# Patient Record
Sex: FEMALE | Race: WHITE | NOT HISPANIC OR LATINO | Employment: UNEMPLOYED | URBAN - NONMETROPOLITAN AREA
[De-identification: names, ages, dates, MRNs, and addresses within clinical notes are randomized per-mention and may not be internally consistent; named-entity substitution may affect disease eponyms.]

---

## 2017-02-13 ENCOUNTER — OFFICE VISIT (OUTPATIENT)
Dept: FAMILY MEDICINE CLINIC | Facility: CLINIC | Age: 22
End: 2017-02-13

## 2017-02-13 VITALS
OXYGEN SATURATION: 99 % | HEIGHT: 69 IN | BODY MASS INDEX: 24.88 KG/M2 | DIASTOLIC BLOOD PRESSURE: 80 MMHG | TEMPERATURE: 99.3 F | SYSTOLIC BLOOD PRESSURE: 108 MMHG | WEIGHT: 168 LBS

## 2017-02-13 DIAGNOSIS — F41.9 ANXIETY: ICD-10-CM

## 2017-02-13 DIAGNOSIS — J02.9 SORE THROAT: Primary | ICD-10-CM

## 2017-02-13 PROCEDURE — 99213 OFFICE O/P EST LOW 20 MIN: CPT | Performed by: NURSE PRACTITIONER

## 2017-02-13 RX ORDER — CLONAZEPAM 0.5 MG/1
0.5 TABLET ORAL 3 TIMES DAILY PRN
Qty: 90 TABLET | Refills: 0 | Status: SHIPPED | OUTPATIENT
Start: 2017-02-13 | End: 2017-07-11

## 2017-02-13 RX ORDER — AZITHROMYCIN 250 MG/1
TABLET, FILM COATED ORAL
Qty: 6 TABLET | Refills: 0 | Status: SHIPPED | OUTPATIENT
Start: 2017-02-13 | End: 2017-06-19

## 2017-02-13 NOTE — PROGRESS NOTES
"  Chief Complaint   Patient presents with   • Cough   • Sore Throat     has gotten better   • Med Refill     Subjective   Nirmala Tamayo is a 21 y.o. female.     Cough   This is a recurrent problem. The current episode started more than 1 month ago. The problem has been rapidly worsening. The problem occurs constantly. The cough is productive of sputum. Associated symptoms include nasal congestion, postnasal drip, rhinorrhea and a sore throat. Pertinent negatives include no chest pain, chills, ear congestion, ear pain, fever, headaches, heartburn, hemoptysis, myalgias, rash, shortness of breath, sweats, weight loss or wheezing. Nothing aggravates the symptoms. She has tried cool air for the symptoms. The treatment provided mild relief. There is no history of asthma, bronchiectasis, bronchitis, COPD, emphysema, environmental allergies or pneumonia.   Sore Throat    Associated symptoms include coughing. Pertinent negatives include no ear pain, headaches or shortness of breath.        The following portions of the patient's history were reviewed and updated as appropriate: allergies, current medications, past social history and problem list.    Review of Systems   Constitutional: Negative for chills, fever and weight loss.   HENT: Positive for postnasal drip, rhinorrhea and sore throat. Negative for ear pain.    Respiratory: Positive for cough. Negative for hemoptysis, shortness of breath and wheezing.    Cardiovascular: Negative for chest pain.   Gastrointestinal: Negative for heartburn.   Musculoskeletal: Negative for myalgias.   Skin: Negative for rash.   Allergic/Immunologic: Negative for environmental allergies.   Neurological: Negative for headaches.       Objective   Visit Vitals   • /80 (BP Location: Left arm, Patient Position: Sitting, Cuff Size: Adult)   • Temp 99.3 °F (37.4 °C) (Tympanic)   • Ht 69\" (175.3 cm)   • Wt 168 lb (76.2 kg)   • SpO2 99%   • BMI 24.81 kg/m2     Physical " Exam    Assessment/Plan   Problem List Items Addressed This Visit     None           New Medications Ordered This Visit   Medications   • azithromycin (ZITHROMAX Z-EMILIANA) 250 MG tablet     Sig: Take 2 tablets the first day, then 1 tablet daily for 4 days.     Dispense:  6 tablet     Refill:  0   • clonazePAM (KlonoPIN) 0.5 MG tablet     Sig: Take 1 tablet by mouth 3 (Three) Times a Day As Needed for anxiety.     Dispense:  90 tablet     Refill:  0

## 2017-02-13 NOTE — PROGRESS NOTES
Chief Complaint   Patient presents with   • Cough   • Sore Throat     has gotten better   • Med Refill     Subjective   Nirmala Tamayo is a 21 y.o. female.     Cough   This is a new problem. The current episode started in the past 7 days. The problem has been gradually worsening. The problem occurs constantly. The cough is productive of sputum. Associated symptoms include myalgias, nasal congestion, postnasal drip, rhinorrhea and a sore throat. Pertinent negatives include no chest pain, chills, ear congestion, ear pain, fever, headaches, heartburn, hemoptysis, rash, shortness of breath, sweats, weight loss or wheezing. Nothing aggravates the symptoms. She has tried oral steroids and OTC inhaler for the symptoms. The treatment provided mild relief. Her past medical history is significant for asthma. There is no history of bronchiectasis, bronchitis, COPD, emphysema, environmental allergies or pneumonia.   Anxiety   Presents for follow-up visit. Symptoms include dizziness, excessive worry, irritability, malaise, nervous/anxious behavior, panic and restlessness. Patient reports no chest pain, compulsions, confusion, decreased concentration, depressed mood, feeling of choking, hyperventilation, impotence, insomnia, muscle tension, nausea, obsessions, palpitations, shortness of breath or suicidal ideas. Symptoms occur constantly. The severity of symptoms is moderate. The quality of sleep is good. Nighttime awakenings: none.     Her past medical history is significant for asthma. Compliance with medications is %.        The following portions of the patient's history were reviewed and updated as appropriate: allergies, current medications, past social history and problem list.    Review of Systems   Constitutional: Positive for irritability. Negative for chills, fever and weight loss.   HENT: Positive for congestion, postnasal drip, rhinorrhea, sinus pressure, sneezing and sore throat. Negative for ear  "pain, facial swelling, hearing loss, mouth sores, nosebleeds, tinnitus, trouble swallowing and voice change.    Eyes: Negative.    Respiratory: Positive for cough. Negative for hemoptysis, shortness of breath and wheezing.    Cardiovascular: Negative.  Negative for chest pain and palpitations.   Gastrointestinal: Negative.  Negative for abdominal distention, abdominal pain, anal bleeding, heartburn and nausea.   Endocrine: Negative.  Negative for cold intolerance, heat intolerance, polydipsia and polyphagia.   Genitourinary: Negative.  Negative for impotence.   Musculoskeletal: Positive for myalgias.   Skin: Negative.  Negative for rash.   Allergic/Immunologic: Negative.  Negative for environmental allergies.   Neurological: Positive for dizziness. Negative for headaches.   Hematological: Negative.    Psychiatric/Behavioral: Negative for behavioral problems, confusion, decreased concentration and suicidal ideas. The patient is nervous/anxious. The patient does not have insomnia.        Objective   Visit Vitals   • /80 (BP Location: Left arm, Patient Position: Sitting, Cuff Size: Adult)   • Temp 99.3 °F (37.4 °C) (Tympanic)   • Ht 69\" (175.3 cm)   • Wt 168 lb (76.2 kg)   • SpO2 99%   • BMI 24.81 kg/m2     Physical Exam   Constitutional: She is oriented to person, place, and time. She appears well-developed and well-nourished.   HENT:   Head: Normocephalic and atraumatic.   Mouth/Throat: Oropharyngeal exudate present.   Eyes: EOM are normal. Pupils are equal, round, and reactive to light.   Neck: Normal range of motion. Neck supple.   Cardiovascular: Normal rate, regular rhythm, normal heart sounds and normal pulses.    Pulmonary/Chest: Effort normal and breath sounds normal.   Abdominal: Soft. Bowel sounds are normal.   Genitourinary: Rectum normal. Pelvic exam was performed with patient supine. Uterus is not deviated, not enlarged, not fixed and not tender. Cervix exhibits no motion tenderness, no discharge " and no friability. Right adnexum displays no mass. Left adnexum displays no mass.   Musculoskeletal: Normal range of motion.   Neurological: She is alert and oriented to person, place, and time.   Skin: Skin is warm and dry.   Psychiatric: She has a normal mood and affect.   Nursing note and vitals reviewed.      Assessment/Plan   Problem List Items Addressed This Visit        Respiratory    Sore throat - Primary       Other    Anxiety           New Medications Ordered This Visit   Medications   • azithromycin (ZITHROMAX Z-EMILIANA) 250 MG tablet     Sig: Take 2 tablets the first day, then 1 tablet daily for 4 days.     Dispense:  6 tablet     Refill:  0   • clonazePAM (KlonoPIN) 0.5 MG tablet     Sig: Take 1 tablet by mouth 3 (Three) Times a Day As Needed for anxiety.     Dispense:  90 tablet     Refill:  0        Patient understands the risks associated with this controlled medication, including tolerance and addiction.  she also agrees to only obtain this medication from me, and not from a another provider, unless that provider is covering for me in my absence.  she also agrees to be compliant in dosing, and not self adjust the dose of medication.  A signed controlled substance agreement is on file, and she has received a controlled substance education sheet at this a previous visit.  she has also signed a consent for treatment with a controlled substance as per [facility] policy. SANGEETHA was obtained.

## 2017-06-20 ENCOUNTER — HOSPITAL ENCOUNTER (EMERGENCY)
Facility: HOSPITAL | Age: 22
Discharge: LEFT BEFORE DISCHARGE | End: 2017-06-20

## 2017-06-20 VITALS
TEMPERATURE: 98.4 F | HEART RATE: 91 BPM | BODY MASS INDEX: 23.25 KG/M2 | OXYGEN SATURATION: 98 % | DIASTOLIC BLOOD PRESSURE: 76 MMHG | HEIGHT: 69 IN | SYSTOLIC BLOOD PRESSURE: 112 MMHG | WEIGHT: 157 LBS | RESPIRATION RATE: 18 BRPM

## 2017-06-20 PROCEDURE — 99211 OFF/OP EST MAY X REQ PHY/QHP: CPT

## 2017-06-20 PROCEDURE — 93005 ELECTROCARDIOGRAM TRACING: CPT | Performed by: EMERGENCY MEDICINE

## 2017-07-06 ENCOUNTER — APPOINTMENT (OUTPATIENT)
Dept: ULTRASOUND IMAGING | Facility: HOSPITAL | Age: 22
End: 2017-07-06

## 2017-07-06 ENCOUNTER — HOSPITAL ENCOUNTER (EMERGENCY)
Facility: HOSPITAL | Age: 22
Discharge: HOME | End: 2017-07-06
Attending: EMERGENCY MEDICINE | Admitting: EMERGENCY MEDICINE

## 2017-07-06 VITALS
TEMPERATURE: 98.7 F | WEIGHT: 157 LBS | DIASTOLIC BLOOD PRESSURE: 74 MMHG | SYSTOLIC BLOOD PRESSURE: 121 MMHG | HEART RATE: 96 BPM | OXYGEN SATURATION: 98 % | BODY MASS INDEX: 23.25 KG/M2 | HEIGHT: 69 IN | RESPIRATION RATE: 18 BRPM

## 2017-07-06 DIAGNOSIS — N39.0 UTI (URINARY TRACT INFECTION), UNCOMPLICATED: ICD-10-CM

## 2017-07-06 DIAGNOSIS — Z3A.01 LESS THAN 8 WEEKS GESTATION OF PREGNANCY: Primary | ICD-10-CM

## 2017-07-06 LAB
ABO GROUP BLD: NORMAL
ALBUMIN SERPL-MCNC: 4.8 G/DL (ref 3.4–4.8)
ALBUMIN/GLOB SERPL: 1.5 G/DL (ref 1.1–1.8)
ALP SERPL-CCNC: 67 U/L (ref 38–126)
ALT SERPL W P-5'-P-CCNC: 30 U/L (ref 9–52)
ANION GAP SERPL CALCULATED.3IONS-SCNC: 13 MMOL/L (ref 5–15)
AST SERPL-CCNC: 23 U/L (ref 14–36)
BACTERIA UR QL AUTO: ABNORMAL /HPF
BASOPHILS # BLD AUTO: 0.02 10*3/MM3 (ref 0–0.2)
BASOPHILS NFR BLD AUTO: 0.3 % (ref 0–2)
BILIRUB SERPL-MCNC: 0.3 MG/DL (ref 0.2–1.3)
BILIRUB UR QL STRIP: NEGATIVE
BUN BLD-MCNC: 7 MG/DL (ref 7–21)
BUN/CREAT SERPL: 11.1 (ref 7–25)
CALCIUM SPEC-SCNC: 9.3 MG/DL (ref 8.4–10.2)
CHLORIDE SERPL-SCNC: 102 MMOL/L (ref 95–110)
CLARITY UR: CLEAR
CO2 SERPL-SCNC: 23 MMOL/L (ref 22–31)
COLOR UR: YELLOW
CREAT BLD-MCNC: 0.63 MG/DL (ref 0.5–1)
DEPRECATED RDW RBC AUTO: 39.6 FL (ref 36.4–46.3)
EOSINOPHIL # BLD AUTO: 0.09 10*3/MM3 (ref 0–0.7)
EOSINOPHIL NFR BLD AUTO: 1.2 % (ref 0–7)
ERYTHROCYTE [DISTWIDTH] IN BLOOD BY AUTOMATED COUNT: 13.3 % (ref 11.5–14.5)
GFR SERPL CREATININE-BSD FRML MDRD: 118 ML/MIN/1.73 (ref 71–165)
GLOBULIN UR ELPH-MCNC: 3.1 GM/DL (ref 2.3–3.5)
GLUCOSE BLD-MCNC: 79 MG/DL (ref 60–100)
GLUCOSE UR STRIP-MCNC: NEGATIVE MG/DL
HCG INTACT+B SERPL-ACNC: 5089 MIU/ML
HCT VFR BLD AUTO: 39.2 % (ref 35–45)
HGB BLD-MCNC: 13.3 G/DL (ref 12–15.5)
HGB UR QL STRIP.AUTO: NEGATIVE
HYALINE CASTS UR QL AUTO: ABNORMAL /LPF
IMM GRANULOCYTES # BLD: 0.01 10*3/MM3 (ref 0–0.02)
IMM GRANULOCYTES NFR BLD: 0.1 % (ref 0–0.5)
KETONES UR QL STRIP: NEGATIVE
LEUKOCYTE ESTERASE UR QL STRIP.AUTO: ABNORMAL
LYMPHOCYTES # BLD AUTO: 1.63 10*3/MM3 (ref 0.6–4.2)
LYMPHOCYTES NFR BLD AUTO: 21.9 % (ref 10–50)
MCH RBC QN AUTO: 27.5 PG (ref 26.5–34)
MCHC RBC AUTO-ENTMCNC: 33.9 G/DL (ref 31.4–36)
MCV RBC AUTO: 81.2 FL (ref 80–98)
MONOCYTES # BLD AUTO: 0.52 10*3/MM3 (ref 0–0.9)
MONOCYTES NFR BLD AUTO: 7 % (ref 0–12)
NEUTROPHILS # BLD AUTO: 5.18 10*3/MM3 (ref 2–8.6)
NEUTROPHILS NFR BLD AUTO: 69.5 % (ref 37–80)
NITRITE UR QL STRIP: NEGATIVE
PH UR STRIP.AUTO: 6.5 [PH] (ref 5–9)
PLATELET # BLD AUTO: 214 10*3/MM3 (ref 150–450)
PMV BLD AUTO: 10 FL (ref 8–12)
POTASSIUM BLD-SCNC: 3.4 MMOL/L (ref 3.5–5.1)
PROT SERPL-MCNC: 7.9 G/DL (ref 6.3–8.6)
PROT UR QL STRIP: NEGATIVE
RBC # BLD AUTO: 4.83 10*6/MM3 (ref 3.77–5.16)
RBC # UR: ABNORMAL /HPF
REF LAB TEST METHOD: ABNORMAL
RH BLD: NEGATIVE
SODIUM BLD-SCNC: 138 MMOL/L (ref 137–145)
SP GR UR STRIP: 1.01 (ref 1–1.03)
SQUAMOUS #/AREA URNS HPF: ABNORMAL /HPF
UROBILINOGEN UR QL STRIP: ABNORMAL
WBC NRBC COR # BLD: 7.45 10*3/MM3 (ref 3.2–9.8)
WBC UR QL AUTO: ABNORMAL /HPF

## 2017-07-06 PROCEDURE — 81001 URINALYSIS AUTO W/SCOPE: CPT | Performed by: EMERGENCY MEDICINE

## 2017-07-06 PROCEDURE — 86901 BLOOD TYPING SEROLOGIC RH(D): CPT | Performed by: EMERGENCY MEDICINE

## 2017-07-06 PROCEDURE — 80053 COMPREHEN METABOLIC PANEL: CPT | Performed by: EMERGENCY MEDICINE

## 2017-07-06 PROCEDURE — 87086 URINE CULTURE/COLONY COUNT: CPT | Performed by: EMERGENCY MEDICINE

## 2017-07-06 PROCEDURE — 76817 TRANSVAGINAL US OBSTETRIC: CPT

## 2017-07-06 PROCEDURE — 86900 BLOOD TYPING SEROLOGIC ABO: CPT | Performed by: EMERGENCY MEDICINE

## 2017-07-06 PROCEDURE — 99284 EMERGENCY DEPT VISIT MOD MDM: CPT

## 2017-07-06 PROCEDURE — 85025 COMPLETE CBC W/AUTO DIFF WBC: CPT | Performed by: EMERGENCY MEDICINE

## 2017-07-06 PROCEDURE — 84702 CHORIONIC GONADOTROPIN TEST: CPT | Performed by: EMERGENCY MEDICINE

## 2017-07-06 RX ORDER — CEPHALEXIN 500 MG/1
500 CAPSULE ORAL 4 TIMES DAILY
Qty: 28 CAPSULE | Refills: 0 | Status: SHIPPED | OUTPATIENT
Start: 2017-07-06 | End: 2017-07-11

## 2017-07-06 RX ORDER — PNV NO.95/FERROUS FUM/FOLIC AC 28MG-0.8MG
1 TABLET ORAL DAILY
Qty: 30 TABLET | Refills: 0 | Status: SHIPPED | OUTPATIENT
Start: 2017-07-06 | End: 2018-02-22

## 2017-07-06 RX ORDER — SODIUM CHLORIDE 0.9 % (FLUSH) 0.9 %
10 SYRINGE (ML) INJECTION AS NEEDED
Status: DISCONTINUED | OUTPATIENT
Start: 2017-07-06 | End: 2017-07-06 | Stop reason: HOSPADM

## 2017-07-06 NOTE — ED PROVIDER NOTES
Subjective   HPI Comments: 23yo female pmh significant asthma,  @ 5 6/7wks gestation via LNMP 2017, presents ED c/o 1d hx pelvic cramping, chills.  ROS neg fever/n/v/d/dysuria/hematuria/vaginal bleeding/vaginal discharge/flank pain.    Patient is a 22 y.o. female presenting with general illness.   Illness   Severity:  Mild  Onset quality:  Sudden  Duration:  1 day  Timing:  Intermittent  Chronicity:  New  Associated symptoms: no fever        Review of Systems   Constitutional: Positive for chills. Negative for fever.   HENT: Negative.    Eyes: Negative.    Respiratory: Negative.    Cardiovascular: Negative.    Gastrointestinal: Negative.    Endocrine: Negative.    Genitourinary: Positive for pelvic pain.       Past Medical History:   Diagnosis Date   • Acute allergic reaction    • Acute bronchitis    • Acute pharyngitis    • Agoraphobia with panic attacks    • Allergic rhinitis    • Anxiety    • Anxiety state    • Asthma     Stable   • Back strain    • Constipation    • Cough    • Disturbance of skin sensation    • Headache    • Irregular periods    • Irritable bowel syndrome with constipation    • Low back pain    • Lumbosacral dysfunction    • Neck pain    • Palpitations    • Rhinitis    • Severe depression    • Spasm     cervical spasm   • Upper respiratory infection    • Vaginal irritation    • Vitreous floaters     prob, not seen on exam   • Wheezing        No Known Allergies    Past Surgical History:   Procedure Laterality Date   • PROCEDURE GENERIC CONVERTED  2016    Physical Therapy Consult       Family History   Problem Relation Age of Onset   • Heart attack Mother      Stents & heart attack   • COPD Sister    • Cancer Other    • Diabetes Other    • Hypertension Other    • Stroke Other    • Thyroid disease Other    • Gallbladder disease Other      Gallstones       Social History     Social History   • Marital status:      Spouse name: N/A   • Number of children: N/A   • Years of  education: N/A     Social History Main Topics   • Smoking status: Never Smoker   • Smokeless tobacco: Never Used   • Alcohol use No   • Drug use: No   • Sexual activity: Not Asked     Other Topics Concern   • None     Social History Narrative   • None           Objective   Physical Exam   Constitutional: She is oriented to person, place, and time. She appears well-developed and well-nourished.   HENT:   Head: Normocephalic and atraumatic.   Mouth/Throat: Oropharynx is clear and moist.   Eyes: Pupils are equal, round, and reactive to light.   Neck: Neck supple. No JVD present. No tracheal deviation present.   Cardiovascular: Normal rate, regular rhythm, normal heart sounds and intact distal pulses.  Exam reveals no gallop and no friction rub.    No murmur heard.  Pulmonary/Chest: Effort normal and breath sounds normal. She has no wheezes. She has no rales.   Abdominal: Soft. Bowel sounds are normal. There is no tenderness. There is no rebound, no guarding and no CVA tenderness.   Musculoskeletal: Normal range of motion. She exhibits no edema or tenderness.   Lymphadenopathy:     She has no cervical adenopathy.   Neurological: She is alert and oriented to person, place, and time.   Skin: Skin is warm and dry.   Nursing note and vitals reviewed.      Procedures         ED Course  ED Course      Labs Reviewed   URINALYSIS W/ CULTURE IF INDICATED - Abnormal; Notable for the following:        Result Value    Leuk Esterase, UA Small (1+) (*)     All other components within normal limits   URINALYSIS, MICROSCOPIC ONLY - Abnormal; Notable for the following:     WBC, UA 6-12 (*)     All other components within normal limits   COMPREHENSIVE METABOLIC PANEL - Abnormal; Notable for the following:     Potassium 3.4 (*)     All other components within normal limits   CBC WITH AUTO DIFFERENTIAL - Normal   URINE CULTURE   HCG, QUANTITATIVE, PREGNANCY   ABO/RH   CBC AND DIFFERENTIAL    Narrative:     The following orders were created  for panel order CBC & Differential.  Procedure                               Abnormality         Status                     ---------                               -----------         ------                     CBC Auto Differential[[id number]]         Normal              Final result                 Please view results for these tests on the individual orders.     Us Ob Transvaginal    Result Date: 7/6/2017  Narrative: Ultrasound OB transvaginal. HISTORY: Pelvic pain. FINDINGS: Small intrauterine gestational sac. A yolk sac is identified within the gestational sac. No fetal pole or cardiac activity as of yet. These findings suggest a gestational age of less than five weeks. A follow-up examination within one week's time is suggested for confirmation of fetal viability. Normal right and left ovaries.     Impression: CONCLUSION: Very small intrauterine gestational sac. Yolk sac is identified but no fetal pole as of yet. No cardiac activity is observed as of yet.  Size of gestational sac suggests a gestational age of less than five weeks. A follow-up examination in one week's time suggested for confirmation fetal viability. Normal ovaries. Electronically signed by:  Jose Maria Card MD  7/6/2017 5:27 PM CDT Workstation: [id number]                [facility]    Final diagnoses:   Less than 8 weeks gestation of pregnancy   UTI (urinary tract infection), uncomplicated            Earl Smith MD  07/06/17 0947

## 2017-07-06 NOTE — DISCHARGE INSTRUCTIONS
Return ED fever, flank pain, abdominal pain, vomiting, dehydration, vaginal bleeding, worse condition, other concerns  Followup Ob/Gyn as directed for further evaluation

## 2017-07-08 LAB — BACTERIA SPEC AEROBE CULT: NORMAL

## 2017-07-11 ENCOUNTER — LAB (OUTPATIENT)
Dept: LAB | Facility: HOSPITAL | Age: 22
End: 2017-07-11

## 2017-07-11 ENCOUNTER — INITIAL PRENATAL (OUTPATIENT)
Dept: OBSTETRICS AND GYNECOLOGY | Facility: CLINIC | Age: 22
End: 2017-07-11

## 2017-07-11 VITALS — DIASTOLIC BLOOD PRESSURE: 64 MMHG | SYSTOLIC BLOOD PRESSURE: 106 MMHG | WEIGHT: 155 LBS | BODY MASS INDEX: 22.89 KG/M2

## 2017-07-11 DIAGNOSIS — Z34.90 THIRD PREGNANCY: ICD-10-CM

## 2017-07-11 DIAGNOSIS — Z3A.00 WEEKS OF GESTATION OF PREGNANCY NOT SPECIFIED: ICD-10-CM

## 2017-07-11 DIAGNOSIS — Z67.91 RH NEGATIVE STATUS DURING PREGNANCY IN FIRST TRIMESTER, ANTEPARTUM: ICD-10-CM

## 2017-07-11 DIAGNOSIS — Z34.90 THIRD PREGNANCY: Primary | ICD-10-CM

## 2017-07-11 DIAGNOSIS — O26.891 RH NEGATIVE STATUS DURING PREGNANCY IN FIRST TRIMESTER, ANTEPARTUM: ICD-10-CM

## 2017-07-11 LAB
ABO GROUP BLD: NORMAL
AMPHET+METHAMPHET UR QL: NEGATIVE
BARBITURATES UR QL SCN: NEGATIVE
BASOPHILS # BLD AUTO: 0.02 10*3/MM3 (ref 0–0.2)
BASOPHILS NFR BLD AUTO: 0.3 % (ref 0–2)
BENZODIAZ UR QL SCN: NEGATIVE
BILIRUB UR QL STRIP: NEGATIVE
BLD GP AB SCN SERPL QL: NEGATIVE
CANDIDA ALBICANS: NEGATIVE
CANNABINOIDS SERPL QL: NEGATIVE
CLARITY UR: CLEAR
COCAINE UR QL: NEGATIVE
COLOR UR: ABNORMAL
DEPRECATED RDW RBC AUTO: 41.4 FL (ref 36.4–46.3)
EOSINOPHIL # BLD AUTO: 0.06 10*3/MM3 (ref 0–0.7)
EOSINOPHIL NFR BLD AUTO: 0.9 % (ref 0–7)
ERYTHROCYTE [DISTWIDTH] IN BLOOD BY AUTOMATED COUNT: 13.7 % (ref 11.5–14.5)
GARDNERELLA VAGINALIS: POSITIVE
GLUCOSE UR STRIP-MCNC: NEGATIVE MG/DL
HCT VFR BLD AUTO: 36.7 % (ref 35–45)
HGB BLD-MCNC: 12.3 G/DL (ref 12–15.5)
HGB UR QL STRIP.AUTO: NEGATIVE
IMM GRANULOCYTES # BLD: 0 10*3/MM3 (ref 0–0.02)
IMM GRANULOCYTES NFR BLD: 0 % (ref 0–0.5)
KETONES UR QL STRIP: ABNORMAL
LEUKOCYTE ESTERASE UR QL STRIP.AUTO: NEGATIVE
LYMPHOCYTES # BLD AUTO: 1.68 10*3/MM3 (ref 0.6–4.2)
LYMPHOCYTES NFR BLD AUTO: 25.6 % (ref 10–50)
Lab: NORMAL
MCH RBC QN AUTO: 27.6 PG (ref 26.5–34)
MCHC RBC AUTO-ENTMCNC: 33.5 G/DL (ref 31.4–36)
MCV RBC AUTO: 82.5 FL (ref 80–98)
METHADONE UR QL SCN: NEGATIVE
MONOCYTES # BLD AUTO: 0.43 10*3/MM3 (ref 0–0.9)
MONOCYTES NFR BLD AUTO: 6.6 % (ref 0–12)
NEUTROPHILS # BLD AUTO: 4.37 10*3/MM3 (ref 2–8.6)
NEUTROPHILS NFR BLD AUTO: 66.6 % (ref 37–80)
NITRITE UR QL STRIP: NEGATIVE
OPIATES UR QL: NEGATIVE
OXYCODONE UR QL SCN: NEGATIVE
PH UR STRIP.AUTO: 6.5 [PH] (ref 5–9)
PLATELET # BLD AUTO: 183 10*3/MM3 (ref 150–450)
PMV BLD AUTO: 10.5 FL (ref 8–12)
PROT UR QL STRIP: NEGATIVE
RBC # BLD AUTO: 4.45 10*6/MM3 (ref 3.77–5.16)
RH BLD: NEGATIVE
SP GR UR STRIP: 1.03 (ref 1–1.03)
TRICHOMONAS VAGINALIS PCR: NEGATIVE
UROBILINOGEN UR QL STRIP: ABNORMAL
WBC NRBC COR # BLD: 6.56 10*3/MM3 (ref 3.2–9.8)

## 2017-07-11 PROCEDURE — 87491 CHLMYD TRACH DNA AMP PROBE: CPT | Performed by: OBSTETRICS & GYNECOLOGY

## 2017-07-11 PROCEDURE — 80307 DRUG TEST PRSMV CHEM ANLYZR: CPT | Performed by: OBSTETRICS & GYNECOLOGY

## 2017-07-11 PROCEDURE — 86803 HEPATITIS C AB TEST: CPT | Performed by: OBSTETRICS & GYNECOLOGY

## 2017-07-11 PROCEDURE — 86900 BLOOD TYPING SEROLOGIC ABO: CPT | Performed by: OBSTETRICS & GYNECOLOGY

## 2017-07-11 PROCEDURE — 87340 HEPATITIS B SURFACE AG IA: CPT | Performed by: OBSTETRICS & GYNECOLOGY

## 2017-07-11 PROCEDURE — 87510 GARDNER VAG DNA DIR PROBE: CPT | Performed by: OBSTETRICS & GYNECOLOGY

## 2017-07-11 PROCEDURE — 87591 N.GONORRHOEAE DNA AMP PROB: CPT | Performed by: OBSTETRICS & GYNECOLOGY

## 2017-07-11 PROCEDURE — 87086 URINE CULTURE/COLONY COUNT: CPT | Performed by: OBSTETRICS & GYNECOLOGY

## 2017-07-11 PROCEDURE — 85025 COMPLETE CBC W/AUTO DIFF WBC: CPT | Performed by: OBSTETRICS & GYNECOLOGY

## 2017-07-11 PROCEDURE — 81003 URINALYSIS AUTO W/O SCOPE: CPT | Performed by: OBSTETRICS & GYNECOLOGY

## 2017-07-11 PROCEDURE — 87480 CANDIDA DNA DIR PROBE: CPT | Performed by: OBSTETRICS & GYNECOLOGY

## 2017-07-11 PROCEDURE — 87660 TRICHOMONAS VAGIN DIR PROBE: CPT | Performed by: OBSTETRICS & GYNECOLOGY

## 2017-07-11 PROCEDURE — 86850 RBC ANTIBODY SCREEN: CPT | Performed by: OBSTETRICS & GYNECOLOGY

## 2017-07-11 PROCEDURE — G0432 EIA HIV-1/HIV-2 SCREEN: HCPCS | Performed by: OBSTETRICS & GYNECOLOGY

## 2017-07-11 PROCEDURE — 0501F PRENATAL FLOW SHEET: CPT | Performed by: OBSTETRICS & GYNECOLOGY

## 2017-07-11 PROCEDURE — 36415 COLL VENOUS BLD VENIPUNCTURE: CPT | Performed by: OBSTETRICS & GYNECOLOGY

## 2017-07-11 PROCEDURE — 86901 BLOOD TYPING SEROLOGIC RH(D): CPT | Performed by: OBSTETRICS & GYNECOLOGY

## 2017-07-11 RX ORDER — NITROFURANTOIN 25; 75 MG/1; MG/1
100 CAPSULE ORAL 2 TIMES DAILY
COMMUNITY
End: 2017-09-12

## 2017-07-11 NOTE — PROGRESS NOTES
Chief Complaint   Patient presents with   • Initial Prenatal Visit       Nirmala Tamayo is a 22 y.o. year old .  Patient's last menstrual period was 2017 (approximate).  She presents to be seen to initiate prenatal care.    Smoking status: Never Smoker                                                              Smokeless status: Never Used                          The following portions of the patient's history were reviewed and updated as appropriate:vital signs, allergies, current medications, past medical history, past social history, past surgical history and problem list.    Lab Review   No data reviewed    Imaging   No data reviewed    Assessment/Plan   ASSESSMENT  1. IUP at 6w4d  Nirmala was seen today for initial prenatal visit.    Diagnoses and all orders for this visit:    Third pregnancy  -     Chlamydia trachomatis, Neisseria gonorrhoeae, PCR  -     OB Panel With HIV; Future  -     Urinalysis; Future  -     Urine Culture  -     Urine Drug Screen  -     Gardnerella vaginalis, Trichomonas vaginalis, Candida albicans, PCR  -     US Ob Transvaginal; Future  -     Hepatitis C Antibody    2.     PLAN  1. Tests ordered today:  Orders Placed This Encounter   Procedures   • Chlamydia trachomatis, Neisseria gonorrhoeae, PCR   • Urine Culture   • Gardnerella vaginalis, Trichomonas vaginalis, Candida albicans, PCR   • US Ob Transvaginal     Standing Status:   Future     Standing Expiration Date:   2018     Order Specific Question:   Reason for Exam:     Answer:   dating   • OB Panel With HIV     Standing Status:   Future     Number of Occurrences:   1     Standing Expiration Date:   2018   • Urinalysis     Standing Status:   Future     Number of Occurrences:   1     Standing Expiration Date:   2018   • Urine Drug Screen   • Hepatitis C Antibody     2. Medications prescribed today:  New Medications Ordered This Visit   Medications   • nitrofurantoin, macrocrystal-monohydrate,  (MACROBID) 100 MG capsule     Sig: Take 100 mg by mouth 2 (Two) Times a Day.       Follow up: 2 week(s)       This note was electronically signed.    Juanito Isaac MD  July 11, 2017

## 2017-07-12 LAB
BACTERIA SPEC AEROBE CULT: NORMAL
C TRACH RRNA CVX QL NAA+PROBE: NOT DETECTED
HBV SURFACE AG SERPL QL IA: NEGATIVE
HCV AB SER DONR QL: NEGATIVE
HIV1+2 AB SER QL: NEGATIVE
N GONORRHOEA RRNA SPEC QL NAA+PROBE: NOT DETECTED
RUBV IGG SER QL: ABNORMAL
RUBV IGG SER-ACNC: 36 IU/ML (ref 0–9.9)

## 2017-07-14 ENCOUNTER — OFFICE VISIT (OUTPATIENT)
Dept: FAMILY MEDICINE CLINIC | Facility: CLINIC | Age: 22
End: 2017-07-14

## 2017-07-14 VITALS
BODY MASS INDEX: 22.96 KG/M2 | SYSTOLIC BLOOD PRESSURE: 102 MMHG | HEIGHT: 69 IN | WEIGHT: 155 LBS | DIASTOLIC BLOOD PRESSURE: 72 MMHG

## 2017-07-14 DIAGNOSIS — N39.0 URINARY TRACT INFECTION, SITE UNSPECIFIED: Primary | ICD-10-CM

## 2017-07-14 LAB
BILIRUB BLD-MCNC: NEGATIVE MG/DL
CLARITY, POC: CLEAR
COLOR UR: NORMAL
GLUCOSE UR STRIP-MCNC: NEGATIVE MG/DL
KETONES UR QL: NEGATIVE
LEUKOCYTE EST, POC: NEGATIVE
NITRITE UR-MCNC: NEGATIVE MG/ML
PH UR: 6 [PH] (ref 5–8)
PROT UR STRIP-MCNC: NEGATIVE MG/DL
RBC # UR STRIP: NEGATIVE /UL
RPR SER QL: NORMAL
SP GR UR: 1.03 (ref 1–1.03)
UROBILINOGEN UR QL: NORMAL

## 2017-07-14 PROCEDURE — 81002 URINALYSIS NONAUTO W/O SCOPE: CPT | Performed by: NURSE PRACTITIONER

## 2017-07-14 PROCEDURE — 99213 OFFICE O/P EST LOW 20 MIN: CPT | Performed by: NURSE PRACTITIONER

## 2017-07-14 NOTE — PROGRESS NOTES
Chief Complaint   Patient presents with   • Follow-up     E/R Follow up for uti     Subjective   Nirmala Tamayo is a 22 y.o. female.     Urinary Tract Infection    This is a recurrent problem. The current episode started in the past 7 days. The problem occurs every urination. The problem has been gradually improving. The pain is at a severity of 2/10. The pain is mild. There has been no fever. She is sexually active. There is no history of pyelonephritis. Associated symptoms include urgency. Pertinent negatives include no chills, discharge, frequency, hematuria, hesitancy, nausea, possible pregnancy, sweats or vomiting. She has tried antibiotics for the symptoms. The treatment provided moderate relief. Her past medical history is significant for recurrent UTIs. There is no history of catheterization, kidney stones, a single kidney, urinary stasis or a urological procedure.        The following portions of the patient's history were reviewed and updated as appropriate: allergies, current medications, past social history and problem list.    Review of Systems   Constitutional: Negative for chills and fever.   HENT: Positive for congestion, postnasal drip, rhinorrhea, sinus pressure, sneezing and sore throat. Negative for ear pain, facial swelling, hearing loss, mouth sores, nosebleeds, tinnitus, trouble swallowing and voice change.    Eyes: Negative.    Respiratory: Positive for cough. Negative for shortness of breath and wheezing.    Cardiovascular: Negative.  Negative for chest pain and palpitations.   Gastrointestinal: Negative.  Negative for abdominal distention, abdominal pain, anal bleeding, nausea and vomiting.   Endocrine: Negative.  Negative for cold intolerance, heat intolerance, polydipsia and polyphagia.   Genitourinary: Positive for urgency and vaginal pain. Negative for frequency, hematuria and hesitancy.   Musculoskeletal: Positive for myalgias.   Skin: Negative.  Negative for rash.  "  Allergic/Immunologic: Negative.  Negative for environmental allergies.   Neurological: Positive for dizziness. Negative for headaches.   Hematological: Negative.    Psychiatric/Behavioral: Negative for behavioral problems, confusion, decreased concentration and suicidal ideas. The patient is nervous/anxious.        Objective   /72  Ht 69\" (175.3 cm)  Wt 155 lb (70.3 kg)  LMP 05/26/2017 (Approximate)  BMI 22.89 kg/m2  Physical Exam   Constitutional: She is oriented to person, place, and time. She appears well-developed and well-nourished.   HENT:   Head: Normocephalic and atraumatic.   Mouth/Throat: No oropharyngeal exudate.   Eyes: EOM are normal. Pupils are equal, round, and reactive to light.   Neck: Normal range of motion. Neck supple.   Cardiovascular: Normal rate, regular rhythm, normal heart sounds and normal pulses.    Pulmonary/Chest: Effort normal and breath sounds normal.   Abdominal: Soft. Bowel sounds are normal. There is tenderness.   Genitourinary: Rectum normal. Pelvic exam was performed with patient supine. Uterus is not deviated, not enlarged, not fixed and not tender. Cervix exhibits no motion tenderness, no discharge and no friability. Right adnexum displays no mass. Left adnexum displays no mass.   Musculoskeletal: Normal range of motion.   Neurological: She is alert and oriented to person, place, and time.   Skin: Skin is warm and dry.   Psychiatric: She has a normal mood and affect.   Nursing note and vitals reviewed.      Assessment/Plan   Problem List Items Addressed This Visit        Genitourinary    Urinary tract infection - Primary    Relevant Orders    POCT urinalysis dipstick, manual (Completed)         No orders of the defined types were placed in this encounter.     uti resolved, finish macrobid as directed, diet discussed, fluids reviewed, patient is 6 weeks preg-continue follow up with OB as directed  "

## 2017-07-19 PROCEDURE — 87086 URINE CULTURE/COLONY COUNT: CPT | Performed by: NURSE PRACTITIONER

## 2017-07-20 RX ORDER — METRONIDAZOLE 7.5 MG/G
GEL VAGINAL NIGHTLY
Qty: 70 G | Refills: 0 | Status: SHIPPED | OUTPATIENT
Start: 2017-07-20 | End: 2017-07-25

## 2017-09-15 ENCOUNTER — OFFICE VISIT (OUTPATIENT)
Dept: FAMILY MEDICINE CLINIC | Facility: CLINIC | Age: 22
End: 2017-09-15

## 2017-09-15 VITALS
DIASTOLIC BLOOD PRESSURE: 68 MMHG | BODY MASS INDEX: 23.25 KG/M2 | HEIGHT: 69 IN | HEART RATE: 84 BPM | WEIGHT: 157 LBS | SYSTOLIC BLOOD PRESSURE: 100 MMHG

## 2017-09-15 DIAGNOSIS — F41.9 ANXIETY: Primary | ICD-10-CM

## 2017-09-15 PROCEDURE — 99213 OFFICE O/P EST LOW 20 MIN: CPT | Performed by: NURSE PRACTITIONER

## 2017-09-15 RX ORDER — HYDROXYZINE PAMOATE 25 MG/1
25 CAPSULE ORAL 3 TIMES DAILY PRN
Qty: 60 CAPSULE | Refills: 5 | Status: SHIPPED | OUTPATIENT
Start: 2017-09-15 | End: 2017-12-07

## 2017-09-15 NOTE — PROGRESS NOTES
Chief Complaint   Patient presents with   • Rapid Heart Rate   • Blurred Vision     when she gets up fast     Subjective   Nirmala Tamayo is a 22 y.o. female.     HPI Comments: Hx of anxiety-similar symptoms     Palpitations    This is a recurrent problem. The current episode started 1 to 4 weeks ago. The problem occurs daily. The problem has been gradually worsening. Nothing aggravates the symptoms. Associated symptoms include anxiety, dizziness and an irregular heartbeat. Pertinent negatives include no chest fullness, chest pain, coughing, diaphoresis, fever, malaise/fatigue, nausea, near-syncope, numbness, shortness of breath, syncope, vomiting or weakness. The treatment provided mild relief. Her past medical history is significant for anxiety. There is no history of anemia, drug use, heart disease, hyperthyroidism or a valve disorder.        The following portions of the patient's history were reviewed and updated as appropriate: allergies, current medications, past social history and problem list.    Review of Systems   Constitutional: Positive for activity change and fatigue. Negative for appetite change, chills, diaphoresis, fever, malaise/fatigue and unexpected weight change.   HENT: Positive for congestion, postnasal drip, rhinorrhea, sinus pressure, sneezing and sore throat. Negative for ear pain, facial swelling, hearing loss, mouth sores, nosebleeds, tinnitus, trouble swallowing and voice change.    Eyes: Negative.  Negative for photophobia, pain, discharge, redness, itching and visual disturbance.   Respiratory: Negative for apnea, cough, choking, chest tightness, shortness of breath and wheezing.    Cardiovascular: Negative for chest pain, palpitations, syncope and near-syncope.   Gastrointestinal: Negative.  Negative for abdominal distention, abdominal pain, anal bleeding, nausea and vomiting.   Endocrine: Negative.  Negative for cold intolerance, heat intolerance, polydipsia and  "polyphagia.   Genitourinary: Negative for frequency, hematuria, menstrual problem, pelvic pain, urgency and vaginal pain.   Musculoskeletal: Positive for myalgias.   Skin: Negative.  Negative for rash.   Allergic/Immunologic: Negative.  Negative for environmental allergies, food allergies and immunocompromised state.   Neurological: Positive for dizziness. Negative for tremors, syncope, weakness, numbness and headaches.   Hematological: Negative.  Negative for adenopathy. Does not bruise/bleed easily.   Psychiatric/Behavioral: Negative for behavioral problems, confusion, decreased concentration and suicidal ideas. The patient is nervous/anxious.    All other systems reviewed and are negative.      Objective   /68  Pulse 84  Ht 69\" (175.3 cm)  Wt 157 lb (71.2 kg)  LMP 05/26/2017 (Approximate)  BMI 23.18 kg/m2  Physical Exam   Constitutional: She is oriented to person, place, and time. She appears well-developed and well-nourished.   HENT:   Head: Normocephalic and atraumatic.   Mouth/Throat: No oropharyngeal exudate.   Eyes: EOM are normal. Pupils are equal, round, and reactive to light. Right eye exhibits no discharge. Left eye exhibits no discharge. No scleral icterus.   Neck: Normal range of motion. Neck supple. No JVD present. No tracheal deviation present. No thyromegaly present.   Cardiovascular: Normal rate, regular rhythm, normal heart sounds and normal pulses.  Exam reveals no gallop and no friction rub.    No murmur heard.  Pulmonary/Chest: Effort normal and breath sounds normal. No stridor. No respiratory distress. She has no wheezes. She has no rales. She exhibits no tenderness.   Abdominal: Soft. Bowel sounds are normal. She exhibits no distension and no mass. There is no tenderness. There is no rebound and no guarding. No hernia.   16 weeks pregnant    Genitourinary: Rectum normal. Pelvic exam was performed with patient supine. Uterus is not deviated, not enlarged, not fixed and not tender. " Cervix exhibits no motion tenderness, no discharge and no friability. Right adnexum displays no mass. Left adnexum displays no mass.   Musculoskeletal: Normal range of motion. She exhibits no edema, tenderness or deformity.   Lymphadenopathy:     She has no cervical adenopathy.   Neurological: She is alert and oriented to person, place, and time. She displays normal reflexes. No cranial nerve deficit. She exhibits normal muscle tone. Coordination normal.   Skin: Skin is warm and dry. No rash noted. No erythema. No pallor.   Psychiatric: She has a normal mood and affect.   Nursing note and vitals reviewed.      Assessment/Plan   Problem List Items Addressed This Visit        Other    Anxiety - Primary           New Medications Ordered This Visit   Medications   • hydrOXYzine (VISTARIL) 25 MG capsule     Sig: Take 1 capsule by mouth 3 (Three) Times a Day As Needed for Anxiety.     Dispense:  60 capsule     Refill:  5      meds as directed use prn , increase fluids meds as directed

## 2017-10-25 ENCOUNTER — OFFICE VISIT (OUTPATIENT)
Dept: FAMILY MEDICINE CLINIC | Facility: CLINIC | Age: 22
End: 2017-10-25

## 2017-10-25 VITALS
OXYGEN SATURATION: 100 % | BODY MASS INDEX: 24.59 KG/M2 | DIASTOLIC BLOOD PRESSURE: 66 MMHG | RESPIRATION RATE: 20 BRPM | SYSTOLIC BLOOD PRESSURE: 108 MMHG | HEIGHT: 69 IN | TEMPERATURE: 99.1 F | HEART RATE: 86 BPM | WEIGHT: 166 LBS
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
## 2017-10-25 PROCEDURE — 99212 OFFICE O/P EST SF 10 MIN: CPT | Performed by: NURSE PRACTITIONER

## 2017-10-25 RX ORDER — RANITIDINE 150 MG/1
150 CAPSULE ORAL 2 TIMES DAILY
Qty: 60 CAPSULE | Refills: 5 | Status: SHIPPED | OUTPATIENT
Start: 2017-10-25 | End: 2017-12-21

## 2017-10-25 NOTE — PROGRESS NOTES
Subjective   Nirmala Tamayo is a 22 y.o. female.  Three days ago noticed what looked like a little pimple to her left upper arm so she tried to squeeze it.  Woke up the next morning and the redness was increasing and began to itch.  Has history of acute allergic reactions in the past and carries an epi pen but is not short of breath or having any swelling.  Only one hive to left upper arm.  Did not take anything due to the fact that she is 25 weeks pregnant.     Insect Bite   This is a new problem. The current episode started in the past 7 days. The problem occurs constantly. The problem has been gradually worsening. Associated symptoms include chills. Pertinent negatives include no fever, joint swelling or rash. Nothing aggravates the symptoms. She has tried nothing for the symptoms. The treatment provided no relief.        The following portions of the patient's history were reviewed and updated as appropriate: allergies, current medications, past family history, past medical history, past social history, past surgical history and problem list.    Review of Systems   Constitutional: Positive for chills. Negative for fever.   Respiratory: Negative.    Cardiovascular: Negative.    Musculoskeletal: Negative for joint swelling.   Skin: Negative for rash.       Objective   Physical Exam   Constitutional: She appears well-developed.   Eyes: EOM are normal. Pupils are equal, round, and reactive to light.   Neck: Normal range of motion.   Cardiovascular: Normal rate, regular rhythm and normal heart sounds.    Pulmonary/Chest: Effort normal and breath sounds normal.   Skin: Skin is warm and dry. Rash noted. Rash is urticarial. There is erythema.        Psychiatric: She has a normal mood and affect. Her behavior is normal. Judgment and thought content normal.       Assessment/Plan   Problems Addressed this Visit     None      Visit Diagnoses     Insect bite, initial encounter    -  Primary    Relevant Medications     ranitidine (ZANTAC) 150 MG capsule    diphenhydrAMINE (BENADRYL) 2 % cream        Begin Zantac during the day and Benadryl cream topically   Marked with a skin marker and instructed to return to clinic if redness spread outside of the marked borders   Instructed to go to nearest ER immediately is shortness of breath or swelling occur         This document has been electronically signed by JOSE CARLOS Mcdonnell on October 25, 2017 4:55 PM

## 2017-12-07 ENCOUNTER — INITIAL PRENATAL (OUTPATIENT)
Dept: OBSTETRICS AND GYNECOLOGY | Facility: CLINIC | Age: 22
End: 2017-12-07

## 2017-12-07 VITALS — DIASTOLIC BLOOD PRESSURE: 67 MMHG | SYSTOLIC BLOOD PRESSURE: 118 MMHG | BODY MASS INDEX: 25.99 KG/M2 | WEIGHT: 176 LBS

## 2017-12-07 DIAGNOSIS — O09.299 HISTORY OF PRE-ECLAMPSIA IN PRIOR PREGNANCY, CURRENTLY PREGNANT: ICD-10-CM

## 2017-12-07 DIAGNOSIS — Z67.91 RH NEGATIVE STATE IN ANTEPARTUM PERIOD, THIRD TRIMESTER: ICD-10-CM

## 2017-12-07 DIAGNOSIS — O26.893 RH NEGATIVE STATE IN ANTEPARTUM PERIOD, THIRD TRIMESTER: ICD-10-CM

## 2017-12-07 DIAGNOSIS — Z3A.27 27 WEEKS GESTATION OF PREGNANCY: Primary | ICD-10-CM

## 2017-12-07 PROCEDURE — 0502F SUBSEQUENT PRENATAL CARE: CPT | Performed by: OBSTETRICS & GYNECOLOGY

## 2017-12-21 ENCOUNTER — ROUTINE PRENATAL (OUTPATIENT)
Dept: OBSTETRICS AND GYNECOLOGY | Facility: CLINIC | Age: 22
End: 2017-12-21

## 2017-12-21 VITALS — DIASTOLIC BLOOD PRESSURE: 70 MMHG | BODY MASS INDEX: 26.58 KG/M2 | SYSTOLIC BLOOD PRESSURE: 112 MMHG | WEIGHT: 180 LBS

## 2017-12-21 DIAGNOSIS — O26.893 RH NEGATIVE STATE IN ANTEPARTUM PERIOD, THIRD TRIMESTER: ICD-10-CM

## 2017-12-21 DIAGNOSIS — Z3A.29 29 WEEKS GESTATION OF PREGNANCY: Primary | ICD-10-CM

## 2017-12-21 DIAGNOSIS — Z67.91 RH NEGATIVE STATE IN ANTEPARTUM PERIOD, THIRD TRIMESTER: ICD-10-CM

## 2017-12-21 DIAGNOSIS — F41.9 ANXIETY: ICD-10-CM

## 2017-12-21 PROCEDURE — 0502F SUBSEQUENT PRENATAL CARE: CPT | Performed by: OBSTETRICS & GYNECOLOGY

## 2017-12-21 RX ORDER — FOLIC ACID 1 MG/1
1 TABLET ORAL DAILY
Qty: 90 TABLET | Refills: 3 | Status: SHIPPED | OUTPATIENT
Start: 2017-12-21 | End: 2018-02-22

## 2017-12-24 NOTE — PROGRESS NOTES
Chief Complaint   Patient presents with   • Routine Prenatal Visit     22-year-old  transferring her care to [facility] 2017 at 27 weeks and 6 days.  She has had 2 prior vaginal deliveries.  2013 Dr. Barrera delivered her first son at 39 weeks weighing 7 lbs. 5 oz. and named Hayden.  2014 Dr. Barrera delivered her daughter at 36 weeks gestation secondary to preeclampsia delivering a 6 lbs. 4 oz. female infant named Conrad.  She had a postpartum hemorrhage with that pregnancy.  She has not had any blood pressure problems before or since.     She received her RhoGAM 2017.     She has had her 1 hour Glucola test that was normal.     This is a boy named Coleman.     She does not currently have any complaints.    I reviewed her records from Dr. Barrera's office.     We will plan for obstetrical ultrasound at 34 weeks.    ROS  Headache: No   Visual changes: No   Swelling in legs: No   Nausea: No   Constipation: No   Diarrhea: No   Contractions: No   Leaking fluid: No   Vaginal bleeding: No   Other:      Lab Results   Component Value Date    HGB 12.3 2017    HCT 36.7 2017    ABO AB 2017    RH Negative 2017    ABSCRN Negative 2017       Specific topics discussed at today's visit: none - she had no major complaints,questions or concerns  Tests done today: none

## 2018-01-02 ENCOUNTER — ROUTINE PRENATAL (OUTPATIENT)
Dept: OBSTETRICS AND GYNECOLOGY | Facility: CLINIC | Age: 23
End: 2018-01-02

## 2018-01-02 VITALS — SYSTOLIC BLOOD PRESSURE: 118 MMHG | DIASTOLIC BLOOD PRESSURE: 63 MMHG | WEIGHT: 185 LBS | BODY MASS INDEX: 27.32 KG/M2

## 2018-01-02 DIAGNOSIS — O26.893 RH NEGATIVE STATE IN ANTEPARTUM PERIOD, THIRD TRIMESTER: ICD-10-CM

## 2018-01-02 DIAGNOSIS — O09.299 HX OF PRE-ECLAMPSIA IN PRIOR PREGNANCY, CURRENTLY PREGNANT: Primary | ICD-10-CM

## 2018-01-02 DIAGNOSIS — Z3A.31 31 WEEKS GESTATION OF PREGNANCY: Primary | ICD-10-CM

## 2018-01-02 DIAGNOSIS — F41.9 ANXIETY: ICD-10-CM

## 2018-01-02 DIAGNOSIS — Z67.91 RH NEGATIVE STATE IN ANTEPARTUM PERIOD, THIRD TRIMESTER: ICD-10-CM

## 2018-01-02 DIAGNOSIS — O09.299 HX OF PREECLAMPSIA, PRIOR PREGNANCY, CURRENTLY PREGNANT: ICD-10-CM

## 2018-01-02 PROCEDURE — 0502F SUBSEQUENT PRENATAL CARE: CPT | Performed by: OBSTETRICS & GYNECOLOGY

## 2018-01-02 NOTE — PROGRESS NOTES
Chief Complaint   Patient presents with   • High Risk Gestation     22-year-old  transferring her care to [facility] 2017 at 27 weeks and 6 days.  She has had 2 prior vaginal deliveries.  2013 Dr. Barrera delivered her first son at 39 weeks weighing 7 lbs. 5 oz. and named Hayden.  2014 Dr. Barrera delivered her daughter at 36 weeks gestation secondary to preeclampsia delivering a 6 lbs. 4 oz. female infant named Conrad.  She had a postpartum hemorrhage with that pregnancy.  She has not had any blood pressure problems before or since.      She received her RhoGAM 2017.      She has had her 1 hour Glucola test that was normal.      This is a boy named Coleman.      She does not currently have any complaints.    I reviewed her records from Dr. Barrera's office.      We will plan for obstetrical ultrasound at 34 weeks.    The patient complains of the following: contractions irregularly    ROS  Headache: No   Visual changes: No   Swelling in legs: No   Nausea: No   Constipation: No   Diarrhea: No   Contractions: YES irregularly   Leaking fluid: No   Vaginal bleeding: No   Other:      Specific topics discussed at today's visit:  labor precautions, fetal kick counts, preeclampsia precautions  Tests done today: none  Tests to be done at the next visit: Obstetrical ultrasound    Nirmala was seen today for high risk gestation.    Diagnoses and all orders for this visit:    31 weeks gestation of pregnancy    Hx of preeclampsia, prior pregnancy, currently pregnant    Rh negative state in antepartum period, third trimester    Anxiety

## 2018-01-18 ENCOUNTER — ROUTINE PRENATAL (OUTPATIENT)
Dept: OBSTETRICS AND GYNECOLOGY | Facility: CLINIC | Age: 23
End: 2018-01-18

## 2018-01-18 VITALS — BODY MASS INDEX: 27.91 KG/M2 | SYSTOLIC BLOOD PRESSURE: 111 MMHG | DIASTOLIC BLOOD PRESSURE: 75 MMHG | WEIGHT: 189 LBS

## 2018-01-18 DIAGNOSIS — Z3A.33 33 WEEKS GESTATION OF PREGNANCY: Primary | ICD-10-CM

## 2018-01-18 DIAGNOSIS — O26.893 RH NEGATIVE STATE IN ANTEPARTUM PERIOD, THIRD TRIMESTER: ICD-10-CM

## 2018-01-18 DIAGNOSIS — Z67.91 RH NEGATIVE STATE IN ANTEPARTUM PERIOD, THIRD TRIMESTER: ICD-10-CM

## 2018-01-18 DIAGNOSIS — O09.299 HX OF PREECLAMPSIA, PRIOR PREGNANCY, CURRENTLY PREGNANT: ICD-10-CM

## 2018-01-18 PROCEDURE — 0502F SUBSEQUENT PRENATAL CARE: CPT | Performed by: OBSTETRICS & GYNECOLOGY

## 2018-01-18 NOTE — PROGRESS NOTES
Chief Complaint   Patient presents with   • High Risk Gestation     22-year-old  transferring her care to [facility] 2017 at 27 weeks and 6 days.  She has had 2 prior vaginal deliveries.  2013 Dr. Barrera delivered her first son at 39 weeks weighing 7 lbs. 5 oz. and named Hayden.  2014 Dr. Barrera delivered her daughter at 36 weeks gestation secondary to preeclampsia delivering a 6 lbs. 4 oz. female infant named Conrad.  She had a postpartum hemorrhage with that pregnancy.  She has not had any blood pressure problems before or since.      She received her RhoGAM 2017.      She has had her 1 hour Glucola test that was normal.      This is a boy named Coleman.      She does not currently have any complaints.    I reviewed her records from Dr. Barrera's office.      Ultrasound 2018 1 intrauterine pregnancy in vertex position with estimated fetal weight 5 lbs. 5 oz. or 52nd percentile.  JULIANN is normal at 12.5.    The patient complains of the following: HA has not taken tylenol    ROS  Headache: YES   Visual changes: No   Swelling in legs: No   Nausea: No   Constipation: No   Diarrhea: No   Contractions: No   Leaking fluid: No   Vaginal bleeding: No   Other:      Specific topics discussed at today's visit: Preeclampsia precautions.  Fetal kick counts.   labor precautions.  Tests done today: Ultrasound  Tests to be done at the next visit: none    Nirmala was seen today for high risk gestation.    Diagnoses and all orders for this visit:    33 weeks gestation of pregnancy    Hx of preeclampsia, prior pregnancy, currently pregnant

## 2018-01-25 ENCOUNTER — ROUTINE PRENATAL (OUTPATIENT)
Dept: OBSTETRICS AND GYNECOLOGY | Facility: CLINIC | Age: 23
End: 2018-01-25

## 2018-01-25 VITALS — DIASTOLIC BLOOD PRESSURE: 71 MMHG | WEIGHT: 189 LBS | BODY MASS INDEX: 27.91 KG/M2 | SYSTOLIC BLOOD PRESSURE: 102 MMHG

## 2018-01-25 DIAGNOSIS — O09.299 HX OF PREECLAMPSIA, PRIOR PREGNANCY, CURRENTLY PREGNANT: ICD-10-CM

## 2018-01-25 DIAGNOSIS — O26.893 RH NEGATIVE STATE IN ANTEPARTUM PERIOD, THIRD TRIMESTER: ICD-10-CM

## 2018-01-25 DIAGNOSIS — Z67.91 RH NEGATIVE STATE IN ANTEPARTUM PERIOD, THIRD TRIMESTER: ICD-10-CM

## 2018-01-25 DIAGNOSIS — F41.9 ANXIETY: ICD-10-CM

## 2018-01-25 DIAGNOSIS — Z3A.34 34 WEEKS GESTATION OF PREGNANCY: Primary | ICD-10-CM

## 2018-01-25 PROCEDURE — 87653 STREP B DNA AMP PROBE: CPT | Performed by: OBSTETRICS & GYNECOLOGY

## 2018-01-25 PROCEDURE — 0502F SUBSEQUENT PRENATAL CARE: CPT | Performed by: OBSTETRICS & GYNECOLOGY

## 2018-01-25 NOTE — PROGRESS NOTES
22-year-old  transferring her care to [facility] 2017 at 27 weeks and 6 days.  She has had 2 prior vaginal deliveries.  2013 Dr. Barrera delivered her first son at 39 weeks weighing 7 lbs. 5 oz. and named Hayden.  2014 Dr. Barrera delivered her daughter at 36 weeks gestation secondary to preeclampsia delivering a 6 lbs. 4 oz. female infant named Conrad.  She had a postpartum hemorrhage with that pregnancy.  She has not had any blood pressure problems before or since.      She received her RhoGAM 2017.      She has had her 1 hour Glucola test that was normal.      This is a boy named Coleman.      She does not currently have any complaints.    I reviewed her records from Dr. Barrera's office.      Ultrasound 2018 1 intrauterine pregnancy in vertex position with estimated fetal weight 5 lbs. 5 oz. or 52nd percentile.  JULIANN is normal at 12.5.    2018 GBS swab performed.  She has had some low back pain today.  Cervix is closed.    The patient complains of the following: Lower back pain      ROS  Headache: No   Visual changes: No   Swelling in legs: No   Nausea: No   Constipation: No   Diarrhea: No   Contractions: No   Leaking fluid: No   Vaginal bleeding: No   Other:      Specific topics discussed at today's visit: none - she had no major complaints,questions or concerns  Tests done today: GBS testing  Tests to be done at the next visit: none    Diagnoses and all orders for this visit:    34 weeks gestation of pregnancy  -     Group B Strep (Molecular) - Swab, Vagina    Hx of preeclampsia, prior pregnancy, currently pregnant    Rh negative state in antepartum period, third trimester    Anxiety

## 2018-01-26 LAB — GROUP B STREP, DNA: NEGATIVE

## 2018-02-01 ENCOUNTER — ROUTINE PRENATAL (OUTPATIENT)
Dept: OBSTETRICS AND GYNECOLOGY | Facility: CLINIC | Age: 23
End: 2018-02-01

## 2018-02-01 ENCOUNTER — HOSPITAL ENCOUNTER (OUTPATIENT)
Facility: HOSPITAL | Age: 23
Discharge: HOME | End: 2018-02-01
Attending: OBSTETRICS & GYNECOLOGY | Admitting: OBSTETRICS & GYNECOLOGY

## 2018-02-01 VITALS
WEIGHT: 191 LBS | HEIGHT: 69 IN | RESPIRATION RATE: 18 BRPM | TEMPERATURE: 99.2 F | BODY MASS INDEX: 28.29 KG/M2 | HEART RATE: 92 BPM | OXYGEN SATURATION: 100 %

## 2018-02-01 VITALS — SYSTOLIC BLOOD PRESSURE: 115 MMHG | DIASTOLIC BLOOD PRESSURE: 62 MMHG | BODY MASS INDEX: 28.28 KG/M2 | WEIGHT: 191.5 LBS

## 2018-02-01 DIAGNOSIS — O26.893 RH NEGATIVE STATE IN ANTEPARTUM PERIOD, THIRD TRIMESTER: ICD-10-CM

## 2018-02-01 DIAGNOSIS — Z3A.35 35 WEEKS GESTATION OF PREGNANCY: Primary | ICD-10-CM

## 2018-02-01 DIAGNOSIS — Z67.91 RH NEGATIVE STATE IN ANTEPARTUM PERIOD, THIRD TRIMESTER: ICD-10-CM

## 2018-02-01 DIAGNOSIS — O09.299 HX OF PREECLAMPSIA, PRIOR PREGNANCY, CURRENTLY PREGNANT: ICD-10-CM

## 2018-02-01 DIAGNOSIS — F41.9 ANXIETY: ICD-10-CM

## 2018-02-01 LAB
FLUAV AG NPH QL: NEGATIVE
FLUBV AG NPH QL IA: NEGATIVE

## 2018-02-01 PROCEDURE — 0502F SUBSEQUENT PRENATAL CARE: CPT | Performed by: OBSTETRICS & GYNECOLOGY

## 2018-02-01 PROCEDURE — G0463 HOSPITAL OUTPT CLINIC VISIT: HCPCS

## 2018-02-01 PROCEDURE — 59025 FETAL NON-STRESS TEST: CPT

## 2018-02-01 PROCEDURE — 87804 INFLUENZA ASSAY W/OPTIC: CPT | Performed by: OBSTETRICS & GYNECOLOGY

## 2018-02-01 PROCEDURE — 59025 FETAL NON-STRESS TEST: CPT | Performed by: ADVANCED PRACTICE MIDWIFE

## 2018-02-01 RX ORDER — ACETAMINOPHEN 325 MG/1
650 TABLET ORAL ONCE
Status: COMPLETED | OUTPATIENT
Start: 2018-02-01 | End: 2018-02-01

## 2018-02-01 RX ORDER — ACETAMINOPHEN 325 MG/1
TABLET ORAL
Status: COMPLETED
Start: 2018-02-01 | End: 2018-02-01

## 2018-02-01 RX ADMIN — ACETAMINOPHEN 650 MG: 325 TABLET ORAL at 19:47

## 2018-02-01 NOTE — PROGRESS NOTES
Chief Complaint   Patient presents with   • OB Follow up   • High Risk Gestation     22-year-old  transferring her care to [facility] 2017 at 27 weeks and 6 days.  She has had 2 prior vaginal deliveries.  2013 Dr. Barrera delivered her first son at 39 weeks weighing 7 lbs. 5 oz. and named Hayden.  2014 Dr. Barrera delivered her daughter at 36 weeks gestation secondary to preeclampsia delivering a 6 lbs. 4 oz. female infant named Conrad.  She had a postpartum hemorrhage with that pregnancy.  She has not had any blood pressure problems before or since.      She received her RhoGAM 2017.      She has had her 1 hour Glucola test that was normal.      This is a boy named Coleman.      She does not currently have any complaints.    I reviewed her records from Dr. Barrera's office.      Ultrasound 2018 1 intrauterine pregnancy in vertex position with estimated fetal weight 5 lbs. 5 oz. or 52nd percentile.  JULIANN is normal at 12.5.     2018 GBS negative.  She has had some low back pain today.  Cervix is closed.    The patient complains of the following: swelling.  Very minimal.    ROS  Headache: Yes.  Mild and relieved with Tylenol.     Visual changes: No   Swelling in legs: YES   Nausea: No   Constipation: No   Diarrhea: No   Contractions: No   Leaking fluid: No   Vaginal bleeding: No   Other:      Specific topics discussed at today's visit: Preeclampsia precautions and fetal kick counts and labor precautions  Tests done today: none  Tests to be done at the next visit: none    Nirmala was seen today for ob follow up and high risk gestation.    Diagnoses and all orders for this visit:    35 weeks gestation of pregnancy    Hx of preeclampsia, prior pregnancy, currently pregnant    Rh negative state in antepartum period, third trimester    Anxiety

## 2018-02-02 PROBLEM — O99.891 BACK PAIN AFFECTING PREGNANCY IN THIRD TRIMESTER: Status: ACTIVE | Noted: 2018-02-02

## 2018-02-02 PROBLEM — M54.9 BACK PAIN AFFECTING PREGNANCY IN THIRD TRIMESTER: Status: ACTIVE | Noted: 2018-02-02

## 2018-02-02 NOTE — DISCHARGE INSTRUCTIONS
Return if vaginal bleeding, think water has broken, decreased fetal movements, or strong regular contractions. Drink plenty of water and keep all scheduled Dr appointments. If temp increases  Great 100.4 and not controlled with tylenol call or come in.

## 2018-02-22 ENCOUNTER — OFFICE VISIT (OUTPATIENT)
Dept: FAMILY MEDICINE CLINIC | Facility: CLINIC | Age: 23
End: 2018-02-22

## 2018-02-22 VITALS
HEIGHT: 69 IN | BODY MASS INDEX: 25.92 KG/M2 | DIASTOLIC BLOOD PRESSURE: 80 MMHG | SYSTOLIC BLOOD PRESSURE: 120 MMHG | WEIGHT: 175 LBS

## 2018-02-22 DIAGNOSIS — M54.2 NECK PAIN, ACUTE: Primary | ICD-10-CM

## 2018-02-22 DIAGNOSIS — F41.9 ANXIETY: ICD-10-CM

## 2018-02-22 PROCEDURE — 99213 OFFICE O/P EST LOW 20 MIN: CPT | Performed by: NURSE PRACTITIONER

## 2018-02-22 RX ORDER — TIZANIDINE 2 MG/1
2 TABLET ORAL EVERY 6 HOURS PRN
Qty: 90 TABLET | Refills: 0 | Status: SHIPPED | OUTPATIENT
Start: 2018-02-22 | End: 2018-03-11

## 2018-02-22 RX ORDER — CLONAZEPAM 0.5 MG/1
0.5 TABLET ORAL 2 TIMES DAILY PRN
Qty: 90 TABLET | Refills: 0 | Status: SHIPPED | OUTPATIENT
Start: 2018-02-22 | End: 2018-08-08 | Stop reason: SDUPTHER

## 2018-02-22 RX ORDER — METHYLPREDNISOLONE 4 MG/1
TABLET ORAL
Qty: 21 EACH | Refills: 0 | Status: SHIPPED | OUTPATIENT
Start: 2018-02-22 | End: 2018-03-11

## 2018-02-22 NOTE — PROGRESS NOTES
Chief Complaint   Patient presents with   • Headache     ? hormones , had baby 8 days ago     Subjective   Nirmala Tamayo is a 23 y.o. female.     HPI Comments: Presents with headaches and anxiety-1 week postpardum denies depression mostly anxiety as she had before     Headache    This is a new problem. The current episode started in the past 7 days. The problem occurs constantly. The pain is at a severity of 6/10. The pain is moderate. Associated symptoms include dizziness, neck pain, rhinorrhea, sinus pressure and a sore throat. Pertinent negatives include no abdominal pain, abnormal behavior, anorexia, back pain, blurred vision, coughing, drainage, ear pain, eye pain, eye redness, facial sweating, fever, hearing loss, insomnia, loss of balance, muscle aches, nausea, numbness, photophobia, scalp tenderness, seizures, swollen glands, tingling, tinnitus, vomiting, weakness or weight loss. She has tried acetaminophen for the symptoms. The treatment provided mild relief.   Neck Pain    Associated symptoms include headaches. Pertinent negatives include no chest pain, fever, numbness, photophobia, tingling, trouble swallowing, weakness or weight loss.   Anxiety   Symptoms include dizziness and nervous/anxious behavior. Patient reports no chest pain, confusion, decreased concentration, insomnia, nausea, palpitations, shortness of breath or suicidal ideas.            The following portions of the patient's history were reviewed and updated as appropriate: allergies, current medications, past social history and problem list.    Review of Systems   Constitutional: Positive for activity change and fatigue. Negative for appetite change, chills, diaphoresis, fever, unexpected weight change and weight loss.   HENT: Positive for congestion, postnasal drip, rhinorrhea, sinus pressure, sneezing and sore throat. Negative for ear pain, facial swelling, hearing loss, mouth sores, nosebleeds, tinnitus, trouble swallowing  "and voice change.    Eyes: Negative.  Negative for blurred vision, photophobia, pain, discharge, redness, itching and visual disturbance.   Respiratory: Negative.  Negative for apnea, cough, choking, chest tightness, shortness of breath and wheezing.    Cardiovascular: Negative.  Negative for chest pain and palpitations.   Gastrointestinal: Negative.  Negative for abdominal distention, abdominal pain, anal bleeding, anorexia, nausea and vomiting.   Endocrine: Negative.  Negative for cold intolerance, heat intolerance, polydipsia and polyphagia.   Genitourinary: Negative.  Negative for frequency, hematuria, menstrual problem, pelvic pain, urgency and vaginal pain.   Musculoskeletal: Positive for myalgias, neck pain and neck stiffness. Negative for back pain, gait problem and joint swelling.   Skin: Negative.  Negative for rash.   Allergic/Immunologic: Negative.  Negative for environmental allergies, food allergies and immunocompromised state.   Neurological: Positive for dizziness and headaches. Negative for tingling, tremors, seizures, syncope, weakness, numbness and loss of balance.   Hematological: Negative.  Negative for adenopathy. Does not bruise/bleed easily.   Psychiatric/Behavioral: Negative for behavioral problems, confusion, decreased concentration, self-injury and suicidal ideas. The patient is nervous/anxious. The patient does not have insomnia.    All other systems reviewed and are negative.      Objective   /80  Ht 175.3 cm (69\")  Wt 79.4 kg (175 lb)  LMP 05/26/2017 (Approximate)  BMI 25.84 kg/m2  Physical Exam   Constitutional: She is oriented to person, place, and time. She appears well-developed and well-nourished.   HENT:   Head: Normocephalic and atraumatic.   Mouth/Throat: No oropharyngeal exudate.   Eyes: EOM are normal. Pupils are equal, round, and reactive to light. Right eye exhibits no discharge. Left eye exhibits no discharge. No scleral icterus.   Neck: Normal range of motion. " Neck supple. No JVD present. No tracheal deviation present. No thyromegaly present.   Cardiovascular: Normal rate, regular rhythm, normal heart sounds and normal pulses.  Exam reveals no gallop and no friction rub.    No murmur heard.  Pulmonary/Chest: Effort normal and breath sounds normal. No stridor. No respiratory distress. She has no wheezes. She has no rales. She exhibits no tenderness.   Abdominal: Soft. Bowel sounds are normal. She exhibits no distension and no mass. There is no tenderness. There is no rebound and no guarding. No hernia.   Genitourinary: Rectum normal. Pelvic exam was performed with patient supine. Uterus is not deviated, not enlarged, not fixed and not tender. Cervix exhibits no motion tenderness, no discharge and no friability. Right adnexum displays no mass. Left adnexum displays no mass.   Musculoskeletal: Normal range of motion. She exhibits tenderness. She exhibits no edema or deformity.   Muscle spasm noted right lateral aspect of neck    Lymphadenopathy:     She has no cervical adenopathy.   Neurological: She is alert and oriented to person, place, and time. She has normal reflexes. She displays normal reflexes. No cranial nerve deficit. She exhibits normal muscle tone. Coordination normal.   Skin: Skin is warm and dry. No rash noted. No erythema. No pallor.   Psychiatric: She has a normal mood and affect.   Nursing note and vitals reviewed.      Assessment/Plan   Problem List Items Addressed This Visit        Nervous and Auditory    Neck pain, acute - Primary       Other    Anxiety           New Medications Ordered This Visit   Medications   • tiZANidine (ZANAFLEX) 2 MG tablet     Sig: Take 1 tablet by mouth Every 6 (Six) Hours As Needed for Muscle Spasms.     Dispense:  90 tablet     Refill:  0   • MethylPREDNISolone (MEDROL, EMILIANA,) 4 MG tablet     Sig: Take as directed on package instructions.     Dispense:  21 each     Refill:  0   • clonazePAM (KLONOPIN) 0.5 MG tablet     Sig:  Take 1 tablet by mouth 2 (Two) Times a Day As Needed for Anxiety.     Dispense:  90 tablet     Refill:  0      suggest massage for neck pain, meds as directed, if worsen call back patient agrees with plan of action     Patient understands the risks associated with this controlled medication, including tolerance and addiction.  she also agrees to only obtain this medication from me, and not from a another provider, unless that provider is covering for me in my absence.  she also agrees to be compliant in dosing, and not self adjust the dose of medication.  A signed controlled substance agreement is on file, and she has received a controlled substance education sheet at this a previous visit.  she has also signed a consent for treatment with a controlled substance as per [facility] policy. SANGEETHA was obtained.

## 2018-05-21 ENCOUNTER — OFFICE VISIT (OUTPATIENT)
Dept: FAMILY MEDICINE CLINIC | Facility: CLINIC | Age: 23
End: 2018-05-21

## 2018-05-21 ENCOUNTER — APPOINTMENT (OUTPATIENT)
Dept: LAB | Facility: HOSPITAL | Age: 23
End: 2018-05-21

## 2018-05-21 VITALS
TEMPERATURE: 101.4 F | DIASTOLIC BLOOD PRESSURE: 78 MMHG | BODY MASS INDEX: 24.81 KG/M2 | SYSTOLIC BLOOD PRESSURE: 110 MMHG | HEIGHT: 69 IN | WEIGHT: 167.5 LBS
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
## 2018-05-21 DIAGNOSIS — R50.9 FEVER, UNSPECIFIED FEVER CAUSE: Primary | ICD-10-CM

## 2018-05-21 DIAGNOSIS — J02.9 SORE THROAT: ICD-10-CM

## 2018-05-21 DIAGNOSIS — N39.0 URINARY TRACT INFECTION WITHOUT HEMATURIA, SITE UNSPECIFIED: ICD-10-CM

## 2018-05-21 DIAGNOSIS — M54.50 LOW BACK PAIN WITH RADIATION: ICD-10-CM

## 2018-05-21 PROBLEM — A93.8: Status: ACTIVE | Noted: 2018-05-21

## 2018-05-21 LAB
BASOPHILS # BLD AUTO: 0.01 10*3/MM3 (ref 0–0.2)
BASOPHILS NFR BLD AUTO: 0.1 % (ref 0–2)
BILIRUB BLD-MCNC: NEGATIVE MG/DL
CLARITY, POC: CLEAR
COLOR UR: ABNORMAL
DEPRECATED RDW RBC AUTO: 41.8 FL (ref 36.4–46.3)
EOSINOPHIL # BLD AUTO: 0.01 10*3/MM3 (ref 0–0.7)
EOSINOPHIL NFR BLD AUTO: 0.1 % (ref 0–7)
ERYTHROCYTE [DISTWIDTH] IN BLOOD BY AUTOMATED COUNT: 14.5 % (ref 11.5–14.5)
EXPIRATION DATE: NORMAL
EXPIRATION DATE: NORMAL
FLUAV AG NPH QL: NORMAL
FLUBV AG NPH QL: NORMAL
GLUCOSE UR STRIP-MCNC: NEGATIVE MG/DL
HCT VFR BLD AUTO: 37.1 % (ref 35–45)
HETEROPH AB SER QL LA: NEGATIVE
HGB BLD-MCNC: 12.4 G/DL (ref 12–15.5)
IMM GRANULOCYTES # BLD: 0.01 10*3/MM3 (ref 0–0.02)
IMM GRANULOCYTES NFR BLD: 0.1 % (ref 0–0.5)
INTERNAL CONTROL: NORMAL
INTERNAL CONTROL: NORMAL
KETONES UR QL: NEGATIVE
LEUKOCYTE EST, POC: ABNORMAL
LYMPHOCYTES # BLD AUTO: 1.01 10*3/MM3 (ref 0.6–4.2)
LYMPHOCYTES NFR BLD AUTO: 14.2 % (ref 10–50)
Lab: NORMAL
Lab: NORMAL
MCH RBC QN AUTO: 26.2 PG (ref 26.5–34)
MCHC RBC AUTO-ENTMCNC: 33.4 G/DL (ref 31.4–36)
MCV RBC AUTO: 78.3 FL (ref 80–98)
MONOCYTES # BLD AUTO: 1.18 10*3/MM3 (ref 0–0.9)
MONOCYTES NFR BLD AUTO: 16.5 % (ref 0–12)
NEUTROPHILS # BLD AUTO: 4.91 10*3/MM3 (ref 2–8.6)
NEUTROPHILS NFR BLD AUTO: 69 % (ref 37–80)
NITRITE UR-MCNC: NEGATIVE MG/ML
PH UR: 7 [PH] (ref 5–8)
PLATELET # BLD AUTO: 135 10*3/MM3 (ref 150–450)
PMV BLD AUTO: 10.3 FL (ref 8–12)
PROT UR STRIP-MCNC: NEGATIVE MG/DL
RBC # BLD AUTO: 4.74 10*6/MM3 (ref 3.77–5.16)
RBC # UR STRIP: NEGATIVE /UL
S PYO AG THROAT QL: NEGATIVE
SP GR UR: 1 (ref 1–1.03)
UROBILINOGEN UR QL: NORMAL
WBC NRBC COR # BLD: 7.13 10*3/MM3 (ref 3.2–9.8)

## 2018-05-21 PROCEDURE — 87880 STREP A ASSAY W/OPTIC: CPT | Performed by: NURSE PRACTITIONER

## 2018-05-21 PROCEDURE — 87804 INFLUENZA ASSAY W/OPTIC: CPT | Performed by: NURSE PRACTITIONER

## 2018-05-21 PROCEDURE — 36415 COLL VENOUS BLD VENIPUNCTURE: CPT | Performed by: NURSE PRACTITIONER

## 2018-05-21 PROCEDURE — 85025 COMPLETE CBC W/AUTO DIFF WBC: CPT | Performed by: NURSE PRACTITIONER

## 2018-05-21 PROCEDURE — 86308 HETEROPHILE ANTIBODY SCREEN: CPT | Performed by: NURSE PRACTITIONER

## 2018-05-21 PROCEDURE — 86757 RICKETTSIA ANTIBODY: CPT | Performed by: NURSE PRACTITIONER

## 2018-05-21 PROCEDURE — 99213 OFFICE O/P EST LOW 20 MIN: CPT | Performed by: NURSE PRACTITIONER

## 2018-05-21 PROCEDURE — 87086 URINE CULTURE/COLONY COUNT: CPT | Performed by: NURSE PRACTITIONER

## 2018-05-21 PROCEDURE — 86618 LYME DISEASE ANTIBODY: CPT

## 2018-05-21 PROCEDURE — 81002 URINALYSIS NONAUTO W/O SCOPE: CPT | Performed by: NURSE PRACTITIONER

## 2018-05-21 RX ORDER — CLARITHROMYCIN 500 MG/1
500 TABLET, COATED ORAL EVERY 12 HOURS SCHEDULED
Qty: 20 TABLET | Refills: 0 | Status: SHIPPED | OUTPATIENT
Start: 2018-05-21 | End: 2018-07-09

## 2018-05-21 NOTE — PROGRESS NOTES
Chief Complaint   Patient presents with   • Chills   • Fever   • Sore Throat     white patches    • Headache     Subjective   Nirmala Tamayo is a 23 y.o. female.     Chills   This is a new problem. The current episode started yesterday. The problem occurs intermittently. The problem has been gradually worsening. Associated symptoms include chills, congestion, diaphoresis, fatigue, a fever, headaches, joint swelling, myalgias, nausea and a sore throat. Pertinent negatives include no abdominal pain, anorexia, change in bowel habit, coughing, neck pain, numbness, rash, swollen glands, urinary symptoms, vertigo, visual change, vomiting or weakness. Nothing aggravates the symptoms. She has tried NSAIDs, eating and acetaminophen for the symptoms. The treatment provided no relief.   Fever    This is a new problem. The current episode started yesterday. The problem has been gradually worsening. Associated symptoms include congestion, headaches, nausea and a sore throat. Pertinent negatives include no abdominal pain, coughing, rash or vomiting. She has tried acetaminophen for the symptoms.   Sore Throat    This is a new problem. The current episode started yesterday. The problem has been gradually worsening. The pain is worse on the left side. The pain is at a severity of 5/10. Associated symptoms include congestion and headaches. Pertinent negatives include no abdominal pain, coughing, neck pain, shortness of breath, swollen glands, trouble swallowing or vomiting.   Headache    Associated symptoms include back pain, a fever, nausea, rhinorrhea, sinus pressure and a sore throat. Pertinent negatives include no abdominal pain, anorexia, coughing, eye pain, eye redness, hearing loss, neck pain, numbness, photophobia, seizures, swollen glands, tinnitus, visual change, vomiting or weakness.   Back Pain   This is a recurrent problem. The current episode started more than 1 month ago. The problem has been gradually  worsening since onset. The pain is present in the lumbar spine. The quality of the pain is described as cramping. The pain is at a severity of 6/10. The pain is severe. The pain is the same all the time. Associated symptoms include a fever and headaches. Pertinent negatives include no abdominal pain, numbness or weakness. She has tried analgesics, bed rest, ice, muscle relaxant and NSAIDs for the symptoms. The treatment provided mild relief.        The following portions of the patient's history were reviewed and updated as appropriate: allergies, current medications, past social history and problem list.    Review of Systems   Constitutional: Positive for activity change, appetite change, chills, diaphoresis, fatigue, fever and unexpected weight change.   HENT: Positive for congestion, postnasal drip, rhinorrhea, sinus pain, sinus pressure, sneezing and sore throat. Negative for hearing loss, nosebleeds, tinnitus, trouble swallowing and voice change.    Eyes: Negative.  Negative for photophobia, pain, discharge, redness, itching and visual disturbance.   Respiratory: Negative.  Negative for apnea, cough, choking, chest tightness and shortness of breath.    Cardiovascular: Negative.    Gastrointestinal: Positive for nausea. Negative for abdominal distention, abdominal pain, anal bleeding, anorexia, blood in stool, change in bowel habit and vomiting.   Endocrine: Negative.  Negative for cold intolerance, heat intolerance, polydipsia, polyphagia and polyuria.   Genitourinary: Negative.    Musculoskeletal: Positive for back pain, gait problem, joint swelling and myalgias. Negative for neck pain and neck stiffness.   Skin: Negative.  Negative for rash.        Recent tick bite -over a week ago.    Allergic/Immunologic: Negative.  Negative for environmental allergies, food allergies and immunocompromised state.   Neurological: Positive for headaches. Negative for vertigo, seizures, weakness, light-headedness and numbness.  "  Hematological: Negative.  Negative for adenopathy. Does not bruise/bleed easily.   Psychiatric/Behavioral: Negative.    All other systems reviewed and are negative.      Objective   /78   Temp (!) 101.4 °F (38.6 °C) (Tympanic)   Ht 175 cm (68.9\")   Wt 76 kg (167 lb 8 oz)   LMP 05/26/2017 (Approximate)   BMI 24.81 kg/m²   Physical Exam   Constitutional: She is oriented to person, place, and time. She appears well-developed and well-nourished.   HENT:   Head: Normocephalic and atraumatic.   Mouth/Throat: Oropharyngeal exudate present.   Eyes: EOM are normal. Pupils are equal, round, and reactive to light. Right eye exhibits no discharge. Left eye exhibits no discharge. No scleral icterus.   Neck: Normal range of motion. Neck supple. No JVD present. No tracheal deviation present. No thyromegaly present.   Cardiovascular: Normal rate, regular rhythm, normal heart sounds, intact distal pulses and normal pulses.    Pulmonary/Chest: Effort normal and breath sounds normal. No stridor. No respiratory distress. She has no wheezes. She has no rales. She exhibits no tenderness.   Abdominal: Soft. Bowel sounds are normal. She exhibits no distension and no mass. There is no tenderness. There is no rebound and no guarding. No hernia.   Genitourinary: Rectum normal. Pelvic exam was performed with patient supine. Uterus is not deviated, not enlarged, not fixed and not tender. Cervix exhibits no motion tenderness, no discharge and no friability. Right adnexum displays no mass. Left adnexum displays no mass.   Musculoskeletal: Normal range of motion. She exhibits tenderness.        Lumbar back: She exhibits tenderness, pain and spasm.        Back:    Lymphadenopathy:     She has no cervical adenopathy.   Neurological: She is alert and oriented to person, place, and time. She displays normal reflexes. No cranial nerve deficit or sensory deficit. She exhibits normal muscle tone. Coordination normal.   Skin: Skin is warm and " dry. No rash noted. No erythema. No pallor.   No rash present    Psychiatric: She has a normal mood and affect.   Nursing note and vitals reviewed.      Assessment/Plan   Problem List Items Addressed This Visit        Respiratory    Sore throat    Relevant Orders    CBC & Differential (Completed)    Mononucleosis Screen (Completed)    CBC Auto Differential (Completed)       Nervous and Auditory    Low back pain with radiation       Genitourinary    Urinary tract infection    Relevant Medications    clarithromycin (BIAXIN) 500 MG tablet    Other Relevant Orders    Urine Culture - Urine, Urine, Clean Catch (Completed)    CBC & Differential (Completed)    Mononucleosis Screen (Completed)    CBC Auto Differential (Completed)       Other    Fever - Primary    Relevant Medications    clarithromycin (BIAXIN) 500 MG tablet    Other Relevant Orders    POCT urinalysis dipstick, manual (Completed)    POC Influenza A / B (Completed)    POC Rapid Strep A (Completed)    CBC & Differential (Completed)    Mononucleosis Screen (Completed)    [place] Spotted Fever, IgM (Completed)    [facility] Spotted Fever, IgG (Completed)    CBC Auto Differential (Completed)    Tick fever    Relevant Medications    clarithromycin (BIAXIN) 500 MG tablet           New Medications Ordered This Visit   Medications   • clarithromycin (BIAXIN) 500 MG tablet     Sig: Take 1 tablet by mouth Every 12 (Twelve) Hours.     Dispense:  20 tablet     Refill:  0      alternate tylenol and motrin ever 4-6 hours -ER if worsen, will call with tick born illness results     Refer to pt for low back pain follow up with me afterwards

## 2018-05-22 LAB
B BURGDOR IGG+IGM SER-ACNC: <0.91 ISR (ref 0–0.9)
R RICKETTSI IGG SER QL IA: NEGATIVE

## 2018-05-23 DIAGNOSIS — M54.50 LOW BACK PAIN RADIATING DOWN LEG: Primary | ICD-10-CM

## 2018-05-23 DIAGNOSIS — M79.606 LOW BACK PAIN RADIATING DOWN LEG: Primary | ICD-10-CM

## 2018-05-23 LAB
BACTERIA SPEC AEROBE CULT: NORMAL
R RICKETTSI IGM TITR SER: 0.39 INDEX (ref 0–0.89)

## 2018-05-23 RX ORDER — CEFUROXIME AXETIL 500 MG/1
500 TABLET ORAL 2 TIMES DAILY
Qty: 20 TABLET | Refills: 0 | Status: SHIPPED | OUTPATIENT
Start: 2018-05-23 | End: 2018-07-09

## 2018-05-23 RX ORDER — BACLOFEN 10 MG/1
10 TABLET ORAL 3 TIMES DAILY
Qty: 90 TABLET | Refills: 5 | Status: SHIPPED | OUTPATIENT
Start: 2018-05-23 | End: 2018-09-14

## 2018-05-24 ENCOUNTER — HOSPITAL ENCOUNTER (OUTPATIENT)
Dept: PHYSICAL THERAPY | Facility: HOSPITAL | Age: 23
Setting detail: THERAPIES SERIES
Discharge: HOME | End: 2018-05-24

## 2018-05-24 DIAGNOSIS — G89.29 CHRONIC RIGHT-SIDED LOW BACK PAIN WITHOUT SCIATICA: Primary | ICD-10-CM

## 2018-05-24 DIAGNOSIS — M54.50 CHRONIC RIGHT-SIDED LOW BACK PAIN WITHOUT SCIATICA: Primary | ICD-10-CM

## 2018-05-24 PROCEDURE — 97110 THERAPEUTIC EXERCISES: CPT

## 2018-05-24 PROCEDURE — 97161 PT EVAL LOW COMPLEX 20 MIN: CPT

## 2018-05-24 PROCEDURE — G0283 ELEC STIM OTHER THAN WOUND: HCPCS

## 2018-05-24 PROCEDURE — 97140 MANUAL THERAPY 1/> REGIONS: CPT

## 2018-05-24 NOTE — THERAPY EVALUATION
Outpatient Physical Therapy Ortho Initial Evaluation  [facility]     Patient Name: Nirmala Tamayo  : 1995  MRN: [id number]  Today's Date: 2018      Visit Date: 2018   Recert: 18    Patient Active Problem List   Diagnosis   • Sore throat   • Anxiety   • Urinary tract infection   • Rh negative state in antepartum period, third trimester   • Hx of preeclampsia, prior pregnancy, currently pregnant   • Back pain affecting pregnancy in third trimester   • Neck pain, acute   • Fever   • Tick fever   • Low back pain with radiation        Past Medical History:   Diagnosis Date   • Acute allergic reaction    • Acute bronchitis    • Acute pharyngitis    • Agoraphobia with panic attacks    • Allergic rhinitis    • Anemia    • Anxiety    • Anxiety state    • Asthma     Stable   • Back strain    • Constipation    • Cough    • Disturbance of skin sensation    • Gestational hypertension     with second pregnancy   • Headache    • History of transfusion     after second delivery   • HPV (human papilloma virus) infection    • Hx of preeclampsia, prior pregnancy, currently pregnant 2018   • Irregular periods    • Irritable bowel syndrome with constipation    • Low back pain    • Lumbosacral dysfunction    • Neck pain    • Palpitations    • Rh negative state in antepartum period, third trimester 2017   • Rhinitis    • Severe depression    • Spasm     cervical spasm   • Upper respiratory infection    • Urinary tract infection 2017   • Vaginal irritation    • Vitreous floaters     prob, not seen on exam   • Wheezing         Past Surgical History:   Procedure Laterality Date   • PROCEDURE GENERIC CONVERTED  2016    Physical Therapy Consult   • WISDOM TOOTH EXTRACTION         Visit Dx:     ICD-10-CM ICD-9-CM   1. Chronic right-sided low back pain without sciatica M54.5 724.2    G89.29 338.29             Patient History     Row Name 18 0900             History    Chief  Complaint Pain  -MW      Type of Pain Back pain  -MW      Brief Description of Current Complaint Pt is a 22 yo female who comes to PT w/ referral for back pain. Pt states that this back pain has been coming and going for the past 2 years. She states she found out 2 years ago per MRI that L5-S1 has a small disc protrusion. She states bending and standing up straight both hurt her low back. She states her pain stays localized to the R SI joint. She denies any pain traveling down into her leg. Pt states she has 3 children. Her oldest child was born in  and her most recent child was born earlier this year. She states she has not had any back pain that she attributes to pregnancy. She states all ADLs that require bending and lifting are difficult.    -MW      Patient/Caregiver Goals Relieve pain  -MW         Pain     Pain Location Back  -MW      Pain at Present 0  -MW      Pain at Best 0  -MW      Pain at Worst 7  -MW      Pain Frequency Intermittent  -MW      What Performance Factors Make the Current Problem(s) WORSE? bending, lifting   -MW      What Performance Factors Make the Current Problem(s) BETTER? Muscle relaxers  -MW      Difficulties with ADL's? yes  -MW         Daily Activities    Primary Language English  -MW        User Key  (r) = Recorded By, (t) = Taken By, (c) = Cosigned By    Initials Name Provider Type    CHU Mario PT Physical Therapist                PT Ortho     Row Name 18 0900       Subjective Comments    Subjective Comments See Therapy Pt Hx for additional details.  -MW       Precautions and Contraindications    Precautions/Limitations no known precautions/limitations  -MW       Subjective Pain    Able to rate subjective pain? yes  -MW       Posture/Observations    Posture/Observations Comments Pt point tender to palpation of R PSIS and R SI border.  Laying supine pt has anteriorly rotated and downward rotated R innominate w/ R LE appearsing slightly longer than L  innominate.  -MW       Head/Neck/Trunk    Trunk Extension AROM to neutral  -MW    Trunk Flexion AROM Pt touches fingertips to patellas  -MW    Trunk Lt Lateral Flexion AROM Pt touches fingertips to lat knee joint line  -MW    Trunk Rt Lateral Flexion AROM Pt touches fingertips to lat knee joint line  -MW    Trunk Lt Rotation AROM 50%  -MW    Trunk Rt Rotation AROM 50%  -MW       General Assessment (Manual Muscle Testing)    Comment, General Manual Muscle Testing (MMT) Assessment MMT performed and results as follows: Bilat hip flex/abd/add 4/5, R hip IR/ER 4-/5, L hip IR/ER 4+/5, bilat knee ext/flex 4/5, ankle DF bilat 4+/5. Pt had pain w/ resisted R hip flex/IR/ER.  -MW       Gait/Stairs Assessment/Training    Comment (Gait/Stairs) Pt has slightly antalgic gait w/ decreased stance time on R LE.  -MW      User Key  (r) = Recorded By, (t) = Taken By, (c) = Cosigned By    Initials Name Provider Type    MW Soco Mario PT Physical Therapist                                  PT OP Goals     Row Name 05/24/18 0900          PT Short Term Goals    STG Date to Achieve --   STG deferred  -MW        Long Term Goals    LTG Date to Achieve 06/21/18  -MW     LTG 1 Pt will be independent w/ HEP  -MW     LTG 1 Progress New  -MW     LTG 2 Pt will improve Modified Oswesrty score to <10/50  -MW     LTG 2 Progress New  -MW     LTG 3 Pt will improve MMT of bilat hip flex to 4+/5 or greater w/o report of pain during the test  -MW     LTG 3 Progress New  -MW     LTG 4 Pt will have subjective improvement rating of >80% w/ ADL performance  -MW     LTG 4 Progress New  -MW        Time Calculation    PT Goal Re-Cert Due Date 06/14/18  -MW       User Key  (r) = Recorded By, (t) = Taken By, (c) = Cosigned By    Initials Name Provider Type    MW Soco Mario PT Physical Therapist                PT Assessment/Plan     Row Name 05/24/18 0900          PT Assessment    Functional Limitations Impaired gait;Impaired locomotion;Limitation in home  management;Limitations in functional capacity and performance;Performance in self-care ADL  -MW     Impairments Gait;Joint mobility;Locomotion;Muscle strength;Pain;Poor body mechanics;Posture;Range of motion  -MW     Assessment Comments The pt presented today for eval and tx of back pain and responded well. Sxs/signs consistent w/ SI dysfunction on the R and possible disc involvement of the lower lumbar spine. It is my impression the pt's recent delivery of her third child may have contributed to the apparent pelvic instability she is experiencing currently. The pt has decreased ROM, decreased strength, and pain that are impacting their functional ability at this time. The pt would benefit from PT services to address these deficits and to ensure pt's safe recovery to PLOF and improved QOL. Time spent discussing SILVA and POC expectations this date. HEP implemented today for sxs management and pt demonstrated understanding. Based on today's session and pt's motivation to improve, I anticipate she will do well w/ rehab.   -MW     Please refer to paper survey for additional self-reported information Yes  -MW     Rehab Potential Good  -MW     Patient/caregiver participated in establishment of treatment plan and goals Yes  -MW     Patient would benefit from skilled therapy intervention Yes  -MW        PT Plan    PT Frequency 2x/week  -MW     Predicted Duration of Therapy Intervention (OT Eval) 3-4 weeks  -MW     Planned CPT's? PT EVAL LOW COMPLEXITY: 03510;PT RE-EVAL: 33170;PT THER PROC EA 15 MIN: 90601;PT THER ACT EA 15 MIN: 86209;PT MANUAL THERAPY EA 15 MIN: 16324;PT NEUROMUSC RE-EDUCATION EA 15 MIN: 55743;PT GAIT TRAINING EA 15 MIN: 53912;PT SELF CARE/HOME MGMT/TRAIN EA 15: 02868;PT HOT OR COLD PACK TREAT MCARE;PT ELECTRICAL STIM UNATTEND: ;PT ULTRASOUND EA 15 MIN: 78823  -MW     Physical Therapy Interventions (Optional Details) home exercise program;joint mobilization;lumbar stabilization;manual therapy  techniques;modalities;neuromuscular re-education;patient/family education;postural re-education;ROM (Range of Motion);strengthening;stretching  -MW     PT Plan Comments Progress per POC. Introduce pelvic tilt progression/core stability next.  -MW       User Key  (r) = Recorded By, (t) = Taken By, (c) = Cosigned By    Initials Name Provider Type    MW Soco Mario, PT Physical Therapist                Modalities     Row Name 05/24/18 0900             ELECTRICAL STIMULATION    Attended/Unattended Unattended  -MW      Stimulation Type IFC  -MW      Location/Electrode Placement/Other R lumbar flank/bracketing PSIS  -MW      Rx Minutes 15 mins  -MW        User Key  (r) = Recorded By, (t) = Taken By, (c) = Cosigned By    Initials Name Provider Type    CHU Mario, PT Physical Therapist              Exercises     Row Name 05/24/18 0900             Subjective Comments    Subjective Comments See Therapy Pt Hx for additional details.  -MW         Subjective Pain    Able to rate subjective pain? yes  -MW         Exercise 1    Exercise Name 1 PT education provided regarding SILVA SI dysfunction/disc herniation. Time spent answering pt's questions and discussing sxs management.  -MW      Time 1 8'  -MW      Additional Comments Skeletal models used  -MW        User Key  (r) = Recorded By, (t) = Taken By, (c) = Cosigned By    Initials Name Provider Type    CHU Mario, PT Physical Therapist           Manual Rx (last 36 hours)      Manual Treatments     Row Name 05/24/18 0900             Manual Rx 1    Manual Rx 1 Location R SI joint/PSIS  -MW      Manual Rx 1 Type MFR   -MW      Manual Rx 1 Duration 8'  -MW         Manual Rx 2    Manual Rx 2 Location R SI  -MW      Manual Rx 2 Type MET for anteriorly/downwardly rotated innominate on R  -MW      Manual Rx 2 Duration 7'  -MW        User Key  (r) = Recorded By, (t) = Taken By, (c) = Cosigned By    Initials Name Provider Type    CHU Mario, PT Physical Therapist                       Outcome Measure Options: Modifed Owestry  Modified Oswestry  Modified Oswestry Score/Comments: 21/50      Time Calculation:   Start Time: 0900  Stop Time: 0955  Time Calculation (min): 55 min  Total Timed Code Minutes- PT: 23 minute(s)     Therapy Charges for Today     Code Description Service Date Service Provider Modifiers Qty    10561955609 HC PT MANUAL THERAPY EA 15 MIN 5/24/2018 Soco Mario, PT GP 1    46387002297 HC PT THER PROC EA 15 MIN 5/24/2018 Soco Mario, PT GP 1    77124649199 HC PT ELECTRICAL STIM UNATTENDED 5/24/2018 Soco Mario, PT  1    38649840483 HC PT EVAL LOW COMPLEXITY 1 5/24/2018 Soco Mario, PT GP 1                Soco Mario, PT  5/24/2018

## 2018-05-30 ENCOUNTER — APPOINTMENT (OUTPATIENT)
Dept: PHYSICAL THERAPY | Facility: HOSPITAL | Age: 23
End: 2018-05-30

## 2018-05-31 ENCOUNTER — APPOINTMENT (OUTPATIENT)
Dept: PHYSICAL THERAPY | Facility: HOSPITAL | Age: 23
End: 2018-05-31

## 2018-07-09 ENCOUNTER — OFFICE VISIT (OUTPATIENT)
Dept: FAMILY MEDICINE CLINIC | Facility: CLINIC | Age: 23
End: 2018-07-09

## 2018-07-09 ENCOUNTER — TELEPHONE (OUTPATIENT)
Dept: FAMILY MEDICINE CLINIC | Facility: CLINIC | Age: 23
End: 2018-07-09

## 2018-07-09 VITALS
HEIGHT: 69 IN | WEIGHT: 162 LBS | BODY MASS INDEX: 23.99 KG/M2 | DIASTOLIC BLOOD PRESSURE: 72 MMHG | SYSTOLIC BLOOD PRESSURE: 110 MMHG

## 2018-07-09 DIAGNOSIS — M51.9 LUMBAR DISC DISORDER: Primary | ICD-10-CM

## 2018-07-09 PROCEDURE — 99213 OFFICE O/P EST LOW 20 MIN: CPT | Performed by: NURSE PRACTITIONER

## 2018-07-09 RX ORDER — ACETAMINOPHEN AND CODEINE PHOSPHATE 300; 30 MG/1; MG/1
1 TABLET ORAL EVERY 4 HOURS PRN
Qty: 60 TABLET | Refills: 0 | Status: SHIPPED | OUTPATIENT
Start: 2018-07-09 | End: 2018-09-14

## 2018-07-09 RX ORDER — TIZANIDINE 4 MG/1
4 TABLET ORAL NIGHTLY PRN
Qty: 90 TABLET | Refills: 5 | Status: SHIPPED | OUTPATIENT
Start: 2018-07-09 | End: 2018-07-31

## 2018-07-09 NOTE — PROGRESS NOTES
Chief Complaint   Patient presents with   • Back Pain     twisted back this morning after picking up car seat with baby in it   • Back Pain     was given toradol and steroid shot at 11a.m. today at [facility]   Nirmala Tamayo is a 23 y.o. female.     Back Pain   This is a recurrent problem. The current episode started today. The problem occurs constantly. The problem has been rapidly worsening since onset. The pain is present in the sacro-iliac. The quality of the pain is described as shooting. The pain does not radiate. The pain is at a severity of 10/10. The pain is severe. The pain is the same all the time. The symptoms are aggravated by twisting, standing, sitting, bending and lying down. Stiffness is present all day. Associated symptoms include numbness, paresis and paresthesias. Pertinent negatives include no abdominal pain, bladder incontinence, bowel incontinence, headaches or leg pain.        The following portions of the patient's history were reviewed and updated as appropriate: allergies, current medications, past social history and problem list.    Review of Systems   Constitutional: Negative.    HENT: Negative.    Eyes: Negative.    Respiratory: Negative.  Negative for apnea, cough, choking, chest tightness and shortness of breath.    Cardiovascular: Negative.    Gastrointestinal: Negative for abdominal pain and bowel incontinence.   Endocrine: Negative.    Genitourinary: Negative for bladder incontinence.   Musculoskeletal: Positive for arthralgias, back pain, gait problem, joint swelling and myalgias. Negative for neck pain and neck stiffness.   Skin: Negative.    Allergic/Immunologic: Negative.  Negative for environmental allergies, food allergies and immunocompromised state.   Neurological: Positive for numbness and paresthesias. Negative for dizziness, seizures, syncope, facial asymmetry, speech difficulty, light-headedness and headaches.   Hematological: Negative.   "  Psychiatric/Behavioral: Negative.        Objective   /72   Ht 175 cm (68.9\")   Wt 73.5 kg (162 lb)   LMP 05/26/2017 (Approximate)   BMI 23.99 kg/m²   Physical Exam   Constitutional: She is oriented to person, place, and time. She appears well-developed and well-nourished.   HENT:   Head: Normocephalic and atraumatic.   Eyes: EOM are normal. Pupils are equal, round, and reactive to light.   Neck: Normal range of motion. Neck supple.   Cardiovascular: Normal rate.    Pulmonary/Chest: Effort normal and breath sounds normal. No respiratory distress. She has no wheezes. She has no rales. She exhibits no tenderness.   Abdominal: Soft. She exhibits no distension. There is no tenderness.   Musculoskeletal: She exhibits tenderness. She exhibits no edema or deformity.   Neurological: She is alert and oriented to person, place, and time. She displays normal reflexes. No cranial nerve deficit or sensory deficit. She exhibits normal muscle tone. Coordination normal.   Skin: Skin is warm and dry.   Nursing note and vitals reviewed.      Assessment/Plan   Problem List Items Addressed This Visit        Musculoskeletal and Integument    Lumbar disc disorder - Primary    Relevant Orders    Ambulatory Referral to Physical Therapy           New Medications Ordered This Visit   Medications   • tiZANidine (ZANAFLEX) 4 MG tablet     Sig: Take 1 tablet by mouth At Night As Needed for Muscle Spasms.     Dispense:  90 tablet     Refill:  5   • acetaminophen-codeine (TYLENOL #3) 300-30 MG per tablet     Sig: Take 1 tablet by mouth Every 4 (Four) Hours As Needed for Moderate Pain .     Dispense:  60 tablet     Refill:  0      refer to pt, meds as directed, otc aleve bid -recheck in 2 weeks for worsening symptoms -sooner if needed    Patient understands the risks associated with this controlled medication, including tolerance and addiction.  she also agrees to only obtain this medication from me, and not from a another provider, " unless that provider is covering for me in my absence.  she also agrees to be compliant in dosing, and not self adjust the dose of medication.  A signed controlled substance agreement is on file, and she has received a controlled substance education sheet at this a previous visit.  she has also signed a consent for treatment with a controlled substance as per [facility] policy. SANGEETHA was obtained.

## 2018-07-10 ENCOUNTER — HOSPITAL ENCOUNTER (OUTPATIENT)
Dept: PHYSICAL THERAPY | Facility: HOSPITAL | Age: 23
Setting detail: THERAPIES SERIES
Discharge: HOME | End: 2018-07-10

## 2018-07-10 DIAGNOSIS — M51.9 LUMBAR DISC DISORDER: Primary | ICD-10-CM

## 2018-07-10 PROCEDURE — 97140 MANUAL THERAPY 1/> REGIONS: CPT | Performed by: PHYSICAL THERAPIST

## 2018-07-10 PROCEDURE — 97032 APPL MODALITY 1+ESTIM EA 15: CPT | Performed by: PHYSICAL THERAPIST

## 2018-07-10 PROCEDURE — 97161 PT EVAL LOW COMPLEX 20 MIN: CPT | Performed by: PHYSICAL THERAPIST

## 2018-07-10 NOTE — THERAPY EVALUATION
Outpatient Physical Therapy Ortho Initial Evaluation  [facility]     Patient Name: Nirmala Tamayo  : 1995  MRN: [id number]  Today's Date: 7/10/2018      Visit Date: 07/10/2018  Attendance:   Subjective % Improvement: N/A  Recert Date: 18  MD appointment: TBD    Therapy Diagnosis: Low back pain    Patient Active Problem List   Diagnosis   • Sore throat   • Anxiety   • Urinary tract infection   • Rh negative state in antepartum period, third trimester   • Hx of preeclampsia, prior pregnancy, currently pregnant   • Back pain affecting pregnancy in third trimester   • Neck pain, acute   • Fever   • Tick fever   • Low back pain with radiation   • Lumbar disc disorder        Past Medical History:   Diagnosis Date   • Acute allergic reaction    • Acute bronchitis    • Acute pharyngitis    • Agoraphobia with panic attacks    • Allergic rhinitis    • Anemia    • Anxiety    • Anxiety state    • Asthma     Stable   • Back strain    • Constipation    • Cough    • Disturbance of skin sensation    • Gestational hypertension     with second pregnancy   • Headache    • History of transfusion     after second delivery   • HPV (human papilloma virus) infection    • Hx of preeclampsia, prior pregnancy, currently pregnant 2018   • Irregular periods    • Irritable bowel syndrome with constipation    • Low back pain    • Lumbosacral dysfunction    • Neck pain    • Palpitations    • Rh negative state in antepartum period, third trimester 2017   • Rhinitis    • Severe depression (CMS/HCC)    • Spasm     cervical spasm   • Upper respiratory infection    • Urinary tract infection 2017   • Vaginal irritation    • Vitreous floaters     prob, not seen on exam   • Wheezing         Past Surgical History:   Procedure Laterality Date   • PROCEDURE GENERIC CONVERTED  2016    Physical Therapy Consult   • WISDOM TOOTH EXTRACTION         Visit Dx:     ICD-10-CM ICD-9-CM   1. Lumbar disc disorder  M51.9 722.93             Patient History     Row Name 07/10/18 0040             History    Chief Complaint Pain  -BB      Type of Pain Back pain  -BB      Brief Description of Current Complaint Present today with low back pain that started back yesterday after leaning forward to  baby in car seat. Reports since that time has been having back pain more on right than left but is bilateral. Notes pain with sitting and most positions with exception of lying down. Notes shooting pains in both thighs. Denies any numbness or tingling sensations. Notes previously PT helped and had been doing well. Has 3 children.   -BB      Patient's Rating of General Health Very good  -BB      Hand Dominance left-handed  -BB      What clinical tests have you had for this problem? X-ray  -BB      Results of Clinical Tests Per patient insignificant   -BB         Pain     Pain Location Back  -BB      Pain at Present 8  -BB      Pain at Best 6  -BB      Pain at Worst 8  -BB      Pain Frequency Constant/continuous  -BB      What Performance Factors Make the Current Problem(s) WORSE? sitting, standing up straight   -BB      What Performance Factors Make the Current Problem(s) BETTER? lying down   -BB         Fall Risk Assessment    Any falls in the past year: No  -BB        User Key  (r) = Recorded By, (t) = Taken By, (c) = Cosigned By    Initials Name Provider Type    KATHLEEN Umana PT Physical Therapist                PT Ortho     Row Name 07/10/18 4005       Precautions and Contraindications    Precautions/Limitations no known precautions/limitations  -BB       Posture/Observations    Posture/Observations Comments Right posterior rotation of SI joint. No acute distress. No antalgic gait.   -BB       Special Tests/Palpation    Special Tests/Palpation Lumbar/SI  -BB       Lumbosacral Palpation    SI Bilateral:;Tender  -BB    Quadratus Lumborum Right:;Tender;Guarded/taut  -BB    Erector Spinae (Paraspinals)  Bilateral:;Tender;Guarded/taut   R>L L3-S1  -BB    Lumbosacral Palpation? Yes  -BB       Lumbar/SI Special Tests    Slump Test (Neural Tension) Bilateral:;Positive  -BB    SI Compression Test (SI Dysfunction) Negative  -BB    SI Distraction Test (SI Dysfunction) Negative  -BB       Head/Neck/Trunk    Trunk Extension AROM 5 degrees with pain   -BB    Trunk Flexion AROM abot 10 degrees with pain   -BB    Trunk Lt Lateral Flexion AROM WNL   -BB    Trunk Rt Lateral Flexion AROM WNL   -BB    Trunk Lt Rotation AROM WNL   -BB    Trunk Rt Rotation AROM WNL   -BB       MMT (Manual Muscle Testing)    Additional Documentation --   Grossly WFL all planes   -BB       Sensation    Sensation WNL? WNL  -BB    Light Touch No apparent deficits  -BB       Transfers    Comment (Transfers) Independent with pain with supine to sit. No pain with bridge   -BB      User Key  (r) = Recorded By, (t) = Taken By, (c) = Cosigned By    Initials Name Provider Type    KATHLEEN Umana, PT Physical Therapist                      Therapy Education  Education Details: POC, ice vs heat   Given: HEP, Symptoms/condition management  Program: New  How Provided: Verbal  Provided to: Patient  Level of Understanding: Verbalized           PT OP Goals     Row Name 07/10/18 1308          PT Short Term Goals    STG Date to Achieve 07/31/18  -BB     STG 1 Independent in HEP   -BB     STG 2 Maintain SI alignment or be independent in self correction   -BB     STG 3 Modified Oswestry of 30% or better   -BB        Time Calculation    PT Goal Re-Cert Due Date 07/31/18  -BB       User Key  (r) = Recorded By, (t) = Taken By, (c) = Cosigned By    Initials Name Provider Type    KATHLEEN Umana, PT Physical Therapist                PT Assessment/Plan     Row Name 07/10/18 1304          PT Assessment    Functional Limitations Impaired gait;Impaired locomotion;Limitation in home management;Limitations in functional capacity and performance;Performance in self-care ADL  -BB      Impairments Gait;Joint mobility;Locomotion;Muscle strength;Pain;Poor body mechanics;Posture;Range of motion  -BB     Assessment Comments Patient presents with on/off spells of low back pain that appears to be due to poor core stability likely correlated to recent pregnancy. Patient presents today with limited trunk ROM and with a malalignment of SI joint that was corrected with MET. Patient also presents to have a tissue knot on right low back that is present as a active trigger point today with seemed to make improvements with manual today. Patient could benefit from skilled PT services to address core instability and functional weakness of the hips.   -BB     Rehab Potential Good  -BB     Patient/caregiver participated in establishment of treatment plan and goals Yes  -BB     Patient would benefit from skilled therapy intervention Yes  -BB        PT Plan    PT Frequency 2x/week  -BB     Predicted Duration of Therapy Intervention (Therapy Eval) 3 weeks  -BB     Planned CPT's? PT EVAL LOW COMPLEXITY: 86987;PT RE-EVAL: 95676;PT THER PROC EA 15 MIN: 58632;PT THER ACT EA 15 MIN: 94057;PT MANUAL THERAPY EA 15 MIN: 70440;PT ELECTRICAL STIM UNATTEND: ;PT ULTRASOUND EA 15 MIN: 88360;PT THER SUPP EA 15 MIN  -BB     PT Plan Comments Progress core stabilization, hip strength, monitor SI alignment, manual and modalities PRN   -BB       User Key  (r) = Recorded By, (t) = Taken By, (c) = Cosigned By    Initials Name Provider Type    BB Deidre Umana, PT Physical Therapist                Modalities     Row Name 07/10/18 1305             Ice    Ice Applied Yes  -BB      Location right low back prone with IFC  -BB      Rx Minutes 15 mins  -BB         ELECTRICAL STIMULATION    Attended/Unattended Unattended  -BB      Stimulation Type IFC  -BB      Location/Electrode Placement/Other right low back   -BB      15378 - PT Electrical Stimulation (Manual) Minutes 15  -BB        User Key  (r) = Recorded By, (t) = Taken By, (c) =  Cosigned By    Initials Name Provider Type    BB Deidre Umana, PT Physical Therapist              Exercises     Row Name 07/10/18 1305             Subjective Comments    Subjective Comments See patient history   -BB         Subjective Pain    Able to rate subjective pain? yes  -BB      Pre-Treatment Pain Level 8  -BB      Post-Treatment Pain Level 7  -BB         Exercise 1    Exercise Name 1 MET for right rotation   -BB      Time 1 3'  -BB         Exercise 2    Exercise Name 2 Manual TrP/cross friction to Right low back   -BB      Time 2 10'  -BB         Exercise 3    Exercise Name 3 See modalities   -BB        User Key  (r) = Recorded By, (t) = Taken By, (c) = Cosigned By    Initials Name Provider Type    BB Deidre Umana, PT Physical Therapist                        Outcome Measure Options: Modifed Owestrroger  Modified Oswestry  Modified Oswestry Score/Comments: 62%      Time Calculation:     Therapy Suggested Charges     Code   Minutes Charges    25567 (CPT®) Hc Pt Neuromusc Re Education Ea 15 Min      41268 (CPT®) Hc Pt Ther Proc Ea 15 Min      79149 (CPT®) Hc Gait Training Ea 15 Min      21199 (CPT®) Hc Pt Therapeutic Act Ea 15 Min      90473 (CPT®) Hc Pt Manual Therapy Ea 15 Min      34708 (CPT®) Hc Pt Ther Massage- Per 15 Min      93542 (CPT®) Hc Pt Iontophoresis Ea 15 Min      84450 (CPT®) Hc Pt Elec Stim Ea-Per 15 Min 15 1    93923 (CPT®) Hc Pt Ultrasound Ea 15 Min      98911 (CPT®) Hc Pt Self Care/Mgmt/Train Ea 15 Min      Total  15 1          Start Time: 1305  Stop Time: 1359  Time Calculation (min): 54 min     Therapy Charges for Today     Code Description Service Date Service Provider Modifiers Qty    06372190184 HC PT ELEC STIM EA-PER 15 MIN 7/10/2018 Deidre Umana, PT GP 1    54653173931 HC PT MANUAL THERAPY EA 15 MIN 7/10/2018 Deidre Umana, PT GP 1    39391105237 HC PT EVAL LOW COMPLEXITY 2 7/10/2018 Deidre Umana, PT GP 1          PT G-Codes  Outcome Measure Options: Modifed Owestry         Deidre  ROMMEL Umana, PT  7/10/2018

## 2018-07-11 ENCOUNTER — APPOINTMENT (OUTPATIENT)
Dept: PHYSICAL THERAPY | Facility: HOSPITAL | Age: 23
End: 2018-07-11

## 2018-07-11 RX ORDER — IBUPROFEN 800 MG/1
800 TABLET ORAL EVERY 8 HOURS PRN
Qty: 90 TABLET | Refills: 2 | Status: SHIPPED | OUTPATIENT
Start: 2018-07-11 | End: 2019-03-09

## 2018-07-19 ENCOUNTER — APPOINTMENT (OUTPATIENT)
Dept: PHYSICAL THERAPY | Facility: HOSPITAL | Age: 23
End: 2018-07-19

## 2018-07-26 ENCOUNTER — HOSPITAL ENCOUNTER (OUTPATIENT)
Dept: PHYSICAL THERAPY | Facility: HOSPITAL | Age: 23
Setting detail: THERAPIES SERIES
End: 2018-07-26

## 2018-07-31 ENCOUNTER — HOSPITAL ENCOUNTER (OUTPATIENT)
Dept: PHYSICAL THERAPY | Facility: HOSPITAL | Age: 23
Setting detail: THERAPIES SERIES
Discharge: HOME | End: 2018-07-31

## 2018-07-31 DIAGNOSIS — M51.9 LUMBAR DISC DISORDER: Primary | ICD-10-CM

## 2018-07-31 PROCEDURE — 97110 THERAPEUTIC EXERCISES: CPT

## 2018-07-31 PROCEDURE — 87480 CANDIDA DNA DIR PROBE: CPT | Performed by: FAMILY MEDICINE

## 2018-07-31 PROCEDURE — 87660 TRICHOMONAS VAGIN DIR PROBE: CPT | Performed by: FAMILY MEDICINE

## 2018-07-31 PROCEDURE — 87510 GARDNER VAG DNA DIR PROBE: CPT | Performed by: FAMILY MEDICINE

## 2018-08-01 ENCOUNTER — TELEPHONE (OUTPATIENT)
Dept: URGENT CARE | Facility: CLINIC | Age: 23
End: 2018-08-01

## 2018-08-01 NOTE — TELEPHONE ENCOUNTER
----- Message from Violette Kline sent at 8/1/2018  3:47 PM CDT -----  Contact: [phone]  Seen last night and needs test results

## 2018-08-01 NOTE — TELEPHONE ENCOUNTER
Called pt to let her know that results weren't back yet. Allergy medicine that Dr. Landa prescribed is working she stated. Informed pt that we would give her a call back when her lab work was back.

## 2018-08-01 NOTE — TELEPHONE ENCOUNTER
Called pt to let her know that Dr. Landa had sent in some medicine to [facility] for BV. She verbally understood and said she would go pick that up.

## 2018-08-08 ENCOUNTER — OFFICE VISIT (OUTPATIENT)
Dept: FAMILY MEDICINE CLINIC | Facility: CLINIC | Age: 23
End: 2018-08-08

## 2018-08-08 VITALS
DIASTOLIC BLOOD PRESSURE: 68 MMHG | HEIGHT: 69 IN | SYSTOLIC BLOOD PRESSURE: 102 MMHG | WEIGHT: 159 LBS | BODY MASS INDEX: 23.55 KG/M2

## 2018-08-08 DIAGNOSIS — F41.9 ANXIETY: ICD-10-CM

## 2018-08-08 DIAGNOSIS — R10.11 RIGHT UPPER QUADRANT PAIN: ICD-10-CM

## 2018-08-08 DIAGNOSIS — K59.09 OTHER CONSTIPATION: ICD-10-CM

## 2018-08-08 DIAGNOSIS — K58.1 IRRITABLE BOWEL SYNDROME WITH CONSTIPATION: Primary | ICD-10-CM

## 2018-08-08 PROBLEM — R10.13 DISTRESS, EPIGASTRIC: Status: ACTIVE | Noted: 2018-08-08

## 2018-08-08 PROCEDURE — 99213 OFFICE O/P EST LOW 20 MIN: CPT | Performed by: NURSE PRACTITIONER

## 2018-08-08 RX ORDER — CLONAZEPAM 0.5 MG/1
0.5 TABLET ORAL 2 TIMES DAILY PRN
Qty: 90 TABLET | Refills: 0 | Status: SHIPPED | OUTPATIENT
Start: 2018-08-08 | End: 2018-12-20 | Stop reason: SDUPTHER

## 2018-08-08 NOTE — PROGRESS NOTES
Chief Complaint   Patient presents with   • GI Problem     constipation     Subjective   Nirmala Tamayo is a 23 y.o. female.     GI Problem   The primary symptoms include abdominal pain, nausea and vomiting. Primary symptoms do not include fever, weight loss, fatigue, diarrhea, melena, hematemesis, jaundice, hematochezia, dysuria, myalgias, arthralgias or rash.   The illness is also significant for constipation. The illness does not include chills, anorexia, dysphagia, odynophagia, bloating, tenesmus, back pain or itching. Associated medical issues do not include inflammatory bowel disease, GERD, gallstones, liver disease, alcohol abuse, PUD, gastric bypass, bowel resection, irritable bowel syndrome, hemorrhoids or diverticulitis.   Anxiety   Presents for follow-up visit. Symptoms include decreased concentration, irritability, nausea, nervous/anxious behavior and panic. Patient reports no chest pain, compulsions, confusion, depressed mood, excessive worry, feeling of choking, hyperventilation, impotence, insomnia, malaise, muscle tension, obsessions, palpitations, restlessness, shortness of breath or suicidal ideas. Symptoms occur most days. The severity of symptoms is moderate. The quality of sleep is good. Nighttime awakenings: none.     Compliance with medications is %.        The following portions of the patient's history were reviewed and updated as appropriate: allergies, current medications, past social history and problem list.    Review of Systems   Constitutional: Positive for irritability. Negative for chills, fatigue, fever and weight loss.   Eyes: Negative.    Respiratory: Negative for shortness of breath.    Cardiovascular: Negative for chest pain and palpitations.   Gastrointestinal: Positive for abdominal distention, abdominal pain, constipation, nausea and vomiting. Negative for anal bleeding, anorexia, bloating, blood in stool, diarrhea, dysphagia, hematemesis, hematochezia,  "jaundice, melena and rectal pain.   Endocrine: Negative.    Genitourinary: Negative for dysuria and impotence.   Musculoskeletal: Negative for arthralgias, back pain, gait problem, joint swelling and myalgias.   Skin: Negative for color change, itching, pallor and rash.   Allergic/Immunologic: Negative.    Neurological: Negative.    Hematological: Negative.    Psychiatric/Behavioral: Positive for decreased concentration and sleep disturbance. Negative for agitation, behavioral problems, confusion, dysphoric mood, hallucinations, self-injury and suicidal ideas. The patient is nervous/anxious. The patient does not have insomnia and is not hyperactive.        Objective   /68   Ht 175.3 cm (69\")   Wt 72.1 kg (159 lb)   BMI 23.48 kg/m²   Physical Exam   Constitutional: She is oriented to person, place, and time. She appears well-developed and well-nourished. No distress.   HENT:   Head: Normocephalic and atraumatic.   Right Ear: External ear normal.   Left Ear: External ear normal.   Mouth/Throat: No oropharyngeal exudate.   Eyes: Pupils are equal, round, and reactive to light. EOM are normal. Right eye exhibits no discharge. Left eye exhibits no discharge. No scleral icterus.   Neck: Normal range of motion. Neck supple.   Cardiovascular: Normal rate and regular rhythm.    Pulmonary/Chest: Effort normal and breath sounds normal. No respiratory distress. She has no wheezes. She has no rales. She exhibits no tenderness.   Abdominal: Soft. She exhibits no distension, no pulsatile liver, no fluid wave, no abdominal bruit, no ascites, no pulsatile midline mass and no mass. There is no hepatosplenomegaly, splenomegaly or hepatomegaly. There is tenderness in the right upper quadrant and epigastric area. There is no rigidity, no rebound, no guarding, no CVA tenderness, no tenderness at McBurney's point and negative Ricci's sign. No hernia.       Musculoskeletal: She exhibits tenderness. She exhibits no edema or " deformity.   Neurological: She is alert and oriented to person, place, and time. She displays normal reflexes. No cranial nerve deficit or sensory deficit. She exhibits normal muscle tone. Coordination normal.   Skin: Skin is warm and dry. No rash noted. She is not diaphoretic. No erythema. No pallor.   Nursing note and vitals reviewed.      Assessment/Plan   Problem List Items Addressed This Visit        Digestive    Other constipation    Irritable bowel syndrome with constipation - Primary       Nervous and Auditory    Distress, epigastric    Relevant Medications    linaclotide (LINZESS) 145 MCG capsule capsule       Other    Anxiety         patient has tried and failed on otc stool softeners -colace, miralax, increase fiber, increased water.             New Medications Ordered This Visit   Medications   • linaclotide (LINZESS) 145 MCG capsule capsule     Sig: Take 1 capsule by mouth Every Morning Before Breakfast.     Dispense:  30 capsule     Refill:  11   • clonazePAM (KLONOPIN) 0.5 MG tablet     Sig: Take 1 tablet by mouth 2 (Two) Times a Day As Needed for Anxiety.     Dispense:  90 tablet     Refill:  0       It's not just what you eat, but when you eat  Eat breakfast, and eat smaller meals throughout the day. A healthy breakfast can jumpstart your metabolism, while eating small, healthy meals (rather than the standard three large meals) keeps your energy up.   Avoid eating at night. Try to eat dinner earlier and fast for 14-16 hours until breakfast the next morning. Studies suggest that eating only when you’re most active and giving your digestive system a long break each day may help to regulate weight.   Patient understands the risks associated with this controlled medication, including tolerance and addiction.  she also agrees to only obtain this medication from me, and not from a another provider, unless that provider is covering for me in my absence.  she also agrees to be compliant in dosing, and not  self adjust the dose of medication.  A signed controlled substance agreement is on file, and she has received a controlled substance education sheet at this a previous visit.  she has also signed a consent for treatment with a controlled substance as per [facility] policy. SANGEETHA was obtained.

## 2018-08-14 ENCOUNTER — HOSPITAL ENCOUNTER (OUTPATIENT)
Dept: ULTRASOUND IMAGING | Facility: HOSPITAL | Age: 23
Discharge: HOME | End: 2018-08-14
Admitting: NURSE PRACTITIONER

## 2018-08-14 ENCOUNTER — APPOINTMENT (OUTPATIENT)
Dept: PHYSICAL THERAPY | Facility: HOSPITAL | Age: 23
End: 2018-08-14

## 2018-08-14 PROCEDURE — 76705 ECHO EXAM OF ABDOMEN: CPT

## 2018-08-27 RX ORDER — EPINEPHRINE 0.3 MG/.3ML
0.3 INJECTION SUBCUTANEOUS ONCE
Qty: 2 EACH | Refills: 1 | Status: SHIPPED | OUTPATIENT
Start: 2018-08-27 | End: 2018-08-27

## 2018-08-28 ENCOUNTER — PRIOR AUTHORIZATION (OUTPATIENT)
Dept: FAMILY MEDICINE CLINIC | Facility: CLINIC | Age: 23
End: 2018-08-28

## 2018-08-29 ENCOUNTER — TELEPHONE (OUTPATIENT)
Dept: FAMILY MEDICINE CLINIC | Facility: CLINIC | Age: 23
End: 2018-08-29

## 2018-09-07 RX ORDER — EPINEPHRINE 0.3 MG/.3ML
0.3 INJECTION SUBCUTANEOUS ONCE
Qty: 2 EACH | Refills: 5 | Status: SHIPPED | OUTPATIENT
Start: 2018-09-07 | End: 2018-09-07

## 2018-09-14 PROCEDURE — 87070 CULTURE OTHR SPECIMN AEROBIC: CPT | Performed by: NURSE PRACTITIONER

## 2018-10-02 ENCOUNTER — DOCUMENTATION (OUTPATIENT)
Dept: PHYSICAL THERAPY | Facility: HOSPITAL | Age: 23
End: 2018-10-02

## 2018-10-08 ENCOUNTER — FLU SHOT (OUTPATIENT)
Dept: FAMILY MEDICINE CLINIC | Facility: CLINIC | Age: 23
End: 2018-10-08

## 2018-10-08 DIAGNOSIS — Z23 FLU VACCINE NEED: ICD-10-CM

## 2018-10-08 PROCEDURE — 90471 IMMUNIZATION ADMIN: CPT | Performed by: FAMILY MEDICINE

## 2018-10-08 PROCEDURE — 90674 CCIIV4 VAC NO PRSV 0.5 ML IM: CPT | Performed by: FAMILY MEDICINE

## 2018-11-01 ENCOUNTER — APPOINTMENT (OUTPATIENT)
Dept: LAB | Facility: HOSPITAL | Age: 23
End: 2018-11-01

## 2018-11-01 PROCEDURE — 84702 CHORIONIC GONADOTROPIN TEST: CPT | Performed by: NURSE PRACTITIONER

## 2018-12-01 PROCEDURE — 87081 CULTURE SCREEN ONLY: CPT | Performed by: NURSE PRACTITIONER

## 2018-12-20 ENCOUNTER — OFFICE VISIT (OUTPATIENT)
Dept: FAMILY MEDICINE CLINIC | Facility: CLINIC | Age: 23
End: 2018-12-20

## 2018-12-20 VITALS
HEIGHT: 69 IN | BODY MASS INDEX: 22.66 KG/M2 | SYSTOLIC BLOOD PRESSURE: 104 MMHG | WEIGHT: 153 LBS | DIASTOLIC BLOOD PRESSURE: 70 MMHG

## 2018-12-20 DIAGNOSIS — F41.9 ANXIETY: Primary | ICD-10-CM

## 2018-12-20 DIAGNOSIS — R10.13 EPIGASTRIC PAIN: ICD-10-CM

## 2018-12-20 PROCEDURE — 99213 OFFICE O/P EST LOW 20 MIN: CPT | Performed by: NURSE PRACTITIONER

## 2018-12-20 RX ORDER — CLONAZEPAM 0.5 MG/1
0.5 TABLET ORAL 2 TIMES DAILY PRN
Qty: 90 TABLET | Refills: 0 | Status: SHIPPED | OUTPATIENT
Start: 2018-12-20 | End: 2018-12-20 | Stop reason: SDUPTHER

## 2018-12-20 RX ORDER — CLONAZEPAM 0.5 MG/1
0.5 TABLET ORAL 3 TIMES DAILY PRN
Qty: 90 TABLET | Refills: 0 | Status: SHIPPED | OUTPATIENT
Start: 2018-12-20 | End: 2019-01-28 | Stop reason: SDUPTHER

## 2018-12-20 NOTE — PROGRESS NOTES
Chief Complaint   Patient presents with   • Heartburn     pain under left arm      Subjective   Nirmala Tamayo is a 23 y.o. female.     Heartburn   She complains of belching, early satiety and heartburn. She reports no abdominal pain, no chest pain, no choking, no coughing, no dysphagia, no globus sensation, no hoarse voice, no nausea, no sore throat, no stridor, no tooth decay, no water brash or no wheezing. This is a recurrent problem. The problem has been gradually worsening. The heartburn duration is several minutes. The heartburn is located in the left chest. The heartburn is of moderate intensity. The heartburn does not wake her from sleep. The heartburn does not limit her activity. The heartburn doesn't change with position. The symptoms are aggravated by certain foods. Pertinent negatives include no anemia, fatigue, melena, muscle weakness, orthopnea or weight loss. Past invasive treatments do not include gastroplasty.   Anxiety   Presents for follow-up visit. Symptoms include decreased concentration, excessive worry, insomnia, irritability, nervous/anxious behavior and panic. Patient reports no chest pain, confusion, depressed mood, dizziness, dry mouth, feeling of choking, hyperventilation, impotence, malaise, muscle tension, nausea, obsessions, palpitations, restlessness, shortness of breath or suicidal ideas. Symptoms occur most days.            The following portions of the patient's history were reviewed and updated as appropriate: allergies, current medications, past social history and problem list.    Review of Systems   Constitutional: Positive for irritability. Negative for activity change, appetite change, chills, diaphoresis, fatigue, fever and weight loss.   HENT: Negative for congestion, hoarse voice, sneezing and sore throat.    Eyes: Negative.  Negative for photophobia, pain, discharge, redness, itching and visual disturbance.   Respiratory: Negative.  Negative for apnea, cough,  "choking, chest tightness, shortness of breath and wheezing.    Cardiovascular: Negative for chest pain, palpitations and leg swelling.   Gastrointestinal: Positive for abdominal distention, constipation, heartburn and vomiting. Negative for abdominal pain, anal bleeding, blood in stool, diarrhea, dysphagia, melena, nausea and rectal pain.   Endocrine: Negative.  Negative for heat intolerance, polydipsia, polyphagia and polyuria.   Genitourinary: Negative for dysuria and impotence.   Musculoskeletal: Negative for arthralgias, back pain, gait problem, joint swelling, myalgias, muscle weakness, neck pain and neck stiffness.   Skin: Negative for color change, pallor and rash.   Allergic/Immunologic: Negative.  Negative for environmental allergies, food allergies and immunocompromised state.   Neurological: Negative.  Negative for dizziness and facial asymmetry.   Hematological: Negative.  Negative for adenopathy. Does not bruise/bleed easily.   Psychiatric/Behavioral: Positive for decreased concentration and sleep disturbance. Negative for agitation, behavioral problems, confusion, dysphoric mood, hallucinations, self-injury and suicidal ideas. The patient is nervous/anxious and has insomnia. The patient is not hyperactive.        Objective   /70   Ht 175.3 cm (69\")   Wt 69.4 kg (153 lb)   BMI 22.59 kg/m²   Physical Exam   Constitutional: She is oriented to person, place, and time. She appears well-developed and well-nourished. No distress.   HENT:   Head: Normocephalic and atraumatic.   Right Ear: External ear normal.   Left Ear: External ear normal.   Nose: Nose normal.   Mouth/Throat: Oropharynx is clear and moist. No oropharyngeal exudate.   Eyes: Conjunctivae and EOM are normal. Pupils are equal, round, and reactive to light. Right eye exhibits no discharge. Left eye exhibits no discharge. No scleral icterus.   Neck: Normal range of motion. Neck supple. No JVD present. No tracheal deviation present. No " thyromegaly present.   Cardiovascular: Normal rate, regular rhythm, normal heart sounds and intact distal pulses. Exam reveals no gallop and no friction rub.   No murmur heard.  Chest wall pain    Pulmonary/Chest: Effort normal and breath sounds normal. No stridor. No respiratory distress. She has no wheezes. She has no rales. She exhibits no tenderness.   Abdominal: Soft. Bowel sounds are normal. She exhibits no distension, no pulsatile liver, no fluid wave, no abdominal bruit, no ascites, no pulsatile midline mass and no mass. There is no hepatosplenomegaly, splenomegaly or hepatomegaly. There is tenderness in the right upper quadrant and epigastric area. There is no rigidity, no rebound, no guarding, no CVA tenderness, no tenderness at McBurney's point and negative Ricci's sign. No hernia.       Epigastric pain    Musculoskeletal: Normal range of motion. She exhibits tenderness. She exhibits no edema or deformity.   Lymphadenopathy:     She has no cervical adenopathy.   Neurological: She is alert and oriented to person, place, and time. She displays normal reflexes. No cranial nerve deficit or sensory deficit. She exhibits normal muscle tone. Coordination normal.   Skin: Skin is warm and dry. No rash noted. She is not diaphoretic. No erythema. No pallor.   Nursing note and vitals reviewed.      Assessment/Plan   Problem List Items Addressed This Visit        Nervous and Auditory    Epigastric pain       Other    Anxiety - Primary           New Medications Ordered This Visit   Medications   • clonazePAM (KLONOPIN) 0.5 MG tablet     Sig: Take 1 tablet by mouth 3 (Three) Times a Day As Needed for Anxiety.     Dispense:  90 tablet     Refill:  0       It's not just what you eat, but when you eat  Eat breakfast, and eat smaller meals throughout the day. A healthy breakfast can jumpstart your metabolism, while eating small, healthy meals (rather than the standard three large meals) keeps your energy up.   Avoid eating  at night. Try to eat dinner earlier and fast for 14-16 hours until breakfast the next morning. Studies suggest that eating only when you’re most active and giving your digestive system a long break each day may help to regulate weight.   Patient understands the risks associated with this controlled medication, including tolerance and addiction.  she also agrees to only obtain this medication from me, and not from a another provider, unless that provider is covering for me in my absence.  she also agrees to be compliant in dosing, and not self adjust the dose of medication.  A signed controlled substance agreement is on file, and she has received a controlled substance education sheet at this a previous visit.  she has also signed a consent for treatment with a controlled substance as per [facility] policy. SANGEETHA was obtained.

## 2019-01-07 ENCOUNTER — DOCUMENTATION (OUTPATIENT)
Dept: PHYSICAL THERAPY | Facility: HOSPITAL | Age: 24
End: 2019-01-07

## 2019-01-28 RX ORDER — CLONAZEPAM 0.5 MG/1
0.5 TABLET ORAL 3 TIMES DAILY PRN
Qty: 90 TABLET | Refills: 0 | OUTPATIENT
Start: 2019-01-28 | End: 2020-01-30

## 2019-02-18 ENCOUNTER — OFFICE VISIT (OUTPATIENT)
Dept: FAMILY MEDICINE CLINIC | Facility: CLINIC | Age: 24
End: 2019-02-18

## 2019-02-18 VITALS
TEMPERATURE: 97.6 F | BODY MASS INDEX: 22.22 KG/M2 | SYSTOLIC BLOOD PRESSURE: 110 MMHG | WEIGHT: 150 LBS | HEIGHT: 69 IN | DIASTOLIC BLOOD PRESSURE: 64 MMHG

## 2019-02-18 DIAGNOSIS — R05.9 COUGH: ICD-10-CM

## 2019-02-18 DIAGNOSIS — J06.9 ACUTE URI: Primary | ICD-10-CM

## 2019-02-18 DIAGNOSIS — J02.9 ACUTE PHARYNGITIS, UNSPECIFIED ETIOLOGY: ICD-10-CM

## 2019-02-18 PROBLEM — O09.299 HX OF PREECLAMPSIA, PRIOR PREGNANCY, CURRENTLY PREGNANT: Status: RESOLVED | Noted: 2018-01-02 | Resolved: 2019-02-18

## 2019-02-18 PROBLEM — N39.0 URINARY TRACT INFECTION: Status: RESOLVED | Noted: 2017-07-14 | Resolved: 2019-02-18

## 2019-02-18 PROBLEM — K59.09 OTHER CONSTIPATION: Status: RESOLVED | Noted: 2018-08-08 | Resolved: 2019-02-18

## 2019-02-18 PROBLEM — M54.2 NECK PAIN, ACUTE: Status: RESOLVED | Noted: 2018-02-22 | Resolved: 2019-02-18

## 2019-02-18 PROBLEM — R50.9 FEVER: Status: RESOLVED | Noted: 2018-05-21 | Resolved: 2019-02-18

## 2019-02-18 PROBLEM — A93.8: Status: RESOLVED | Noted: 2018-05-21 | Resolved: 2019-02-18

## 2019-02-18 PROBLEM — M54.50 LOW BACK PAIN WITH RADIATION: Chronic | Status: ACTIVE | Noted: 2018-05-23

## 2019-02-18 PROBLEM — M54.9 BACK PAIN AFFECTING PREGNANCY IN THIRD TRIMESTER: Status: RESOLVED | Noted: 2018-02-02 | Resolved: 2019-02-18

## 2019-02-18 PROBLEM — O26.893 RH NEGATIVE STATE IN ANTEPARTUM PERIOD, THIRD TRIMESTER: Status: RESOLVED | Noted: 2017-12-07 | Resolved: 2019-02-18

## 2019-02-18 PROBLEM — Z67.91 RH NEGATIVE STATE IN ANTEPARTUM PERIOD, THIRD TRIMESTER: Status: RESOLVED | Noted: 2017-12-07 | Resolved: 2019-02-18

## 2019-02-18 PROBLEM — R10.13 EPIGASTRIC PAIN: Chronic | Status: ACTIVE | Noted: 2018-08-08

## 2019-02-18 PROBLEM — K58.1 IRRITABLE BOWEL SYNDROME WITH CONSTIPATION: Chronic | Status: ACTIVE | Noted: 2018-08-08

## 2019-02-18 PROBLEM — O99.891 BACK PAIN AFFECTING PREGNANCY IN THIRD TRIMESTER: Status: RESOLVED | Noted: 2018-02-02 | Resolved: 2019-02-18

## 2019-02-18 PROBLEM — M51.9 LUMBAR DISC DISORDER: Chronic | Status: ACTIVE | Noted: 2018-07-09

## 2019-02-18 PROCEDURE — 99213 OFFICE O/P EST LOW 20 MIN: CPT | Performed by: FAMILY MEDICINE

## 2019-02-18 RX ORDER — GUAIFENESIN 600 MG/1
TABLET, EXTENDED RELEASE ORAL
Qty: 20 TABLET | Refills: 1 | Status: SHIPPED | OUTPATIENT
Start: 2019-02-18 | End: 2019-03-09

## 2019-02-18 RX ORDER — PREDNISONE 20 MG/1
TABLET ORAL
Qty: 10 TABLET | Refills: 0 | Status: SHIPPED | OUTPATIENT
Start: 2019-02-18 | End: 2019-03-09

## 2019-02-18 RX ORDER — FLUTICASONE PROPIONATE 50 MCG
2 SPRAY, SUSPENSION (ML) NASAL DAILY
Qty: 1 BOTTLE | Refills: 5 | OUTPATIENT
Start: 2019-02-18 | End: 2019-04-29

## 2019-02-18 NOTE — PROGRESS NOTES
Subjective   Nirmala Tamayo is a 24 y.o. female.     History of Present Illness requesting evaluation sinus pressure drainage postnasal drip sore throat for the past 2-3 days.  Question exposure.  Has had no fever no chills.  Been using over-the-counter medicines as outlined.    The following portions of the patient's history were reviewed and updated as appropriate: allergies, current medications, past family history, past medical history, past social history, past surgical history and problem list.    Review of Systems   Constitutional: Negative for activity change, appetite change, fatigue and unexpected weight change.   HENT: Positive for congestion, rhinorrhea and sinus pressure. Negative for trouble swallowing and voice change.    Eyes: Negative for redness and visual disturbance.   Respiratory: Positive for cough. Negative for wheezing.    Cardiovascular: Negative for chest pain and palpitations.   Gastrointestinal: Negative for abdominal pain, constipation, diarrhea, nausea and vomiting.   Genitourinary: Negative for urgency.   Musculoskeletal: Negative for joint swelling.   Neurological: Negative for syncope and headaches.   Hematological: Negative for adenopathy.   Psychiatric/Behavioral: Negative for sleep disturbance.       Objective   Physical Exam   Constitutional: She is oriented to person, place, and time. She appears well-developed.   HENT:   Head: Normocephalic.   Right Ear: External ear normal.   Nose: Mucosal edema and rhinorrhea present.   Mouth/Throat: Oropharynx is clear and moist.   Eyes: Pupils are equal, round, and reactive to light.   Neck: Normal range of motion. No thyromegaly present.   Cardiovascular: Normal rate, regular rhythm, normal heart sounds and intact distal pulses. Exam reveals no gallop and no friction rub.   No murmur heard.  Pulmonary/Chest: Breath sounds normal.   Abdominal: Soft. She exhibits no distension and no mass. There is no tenderness.   Musculoskeletal:  Normal range of motion.   Neurological: She is alert and oriented to person, place, and time. She has normal reflexes.   Skin: Skin is warm and dry.   Psychiatric: She has a normal mood and affect.       Assessment/Plan   Nirmala was seen today for sinusitis.    Diagnoses and all orders for this visit:    Acute URI  -     fluticasone (FLONASE) 50 MCG/ACT nasal spray; 2 sprays into the nostril(s) as directed by provider Daily. Till symptoms gone  -     predniSONE (DELTASONE) 20 MG tablet; 2qdx 5 for sinus  -     guaiFENesin (MUCINEX) 600 MG 12 hr tablet; 1 bid till congestion gone    Cough  -     fluticasone (FLONASE) 50 MCG/ACT nasal spray; 2 sprays into the nostril(s) as directed by provider Daily. Till symptoms gone  -     predniSONE (DELTASONE) 20 MG tablet; 2qdx 5 for sinus  -     guaiFENesin (MUCINEX) 600 MG 12 hr tablet; 1 bid till congestion gone    Acute pharyngitis, unspecified etiology  -     fluticasone (FLONASE) 50 MCG/ACT nasal spray; 2 sprays into the nostril(s) as directed by provider Daily. Till symptoms gone  -     predniSONE (DELTASONE) 20 MG tablet; 2qdx 5 for sinus  -     guaiFENesin (MUCINEX) 600 MG 12 hr tablet; 1 bid till congestion gone       Counseled on [employer] pot use vaporizers environmental control measures hydration short-term medications observation recheck as directed

## 2019-02-19 ENCOUNTER — TELEPHONE (OUTPATIENT)
Dept: FAMILY MEDICINE CLINIC | Facility: CLINIC | Age: 24
End: 2019-02-19

## 2019-02-19 NOTE — TELEPHONE ENCOUNTER
ENEDELIA SEN WAS SEEN YESTERDAY AND IS NOT FEELING ANY BETTER..CHILLS,,SHE JUST FOUND OUT HER MOTHER HAS THE FLU AND TESTED POSITIVE..SHE AND THE CHILDREN HAVE BEEN AROUND HER QUITE A LOT LATELY..SHE IS ASKING IF SHE NEEDS TO COME BACK AND BE TESTED FOR THE FLU ALSO?  CALL  [phone]

## 2019-02-20 ENCOUNTER — OFFICE VISIT (OUTPATIENT)
Dept: FAMILY MEDICINE CLINIC | Facility: CLINIC | Age: 24
End: 2019-02-20

## 2019-02-20 VITALS
OXYGEN SATURATION: 100 % | TEMPERATURE: 100.2 F | HEIGHT: 69 IN | DIASTOLIC BLOOD PRESSURE: 78 MMHG | BODY MASS INDEX: 22.36 KG/M2 | WEIGHT: 151 LBS | SYSTOLIC BLOOD PRESSURE: 110 MMHG

## 2019-02-20 DIAGNOSIS — J06.9 ACUTE UPPER RESPIRATORY INFECTION: Primary | ICD-10-CM

## 2019-02-20 DIAGNOSIS — H66.92 LEFT OTITIS MEDIA, UNSPECIFIED OTITIS MEDIA TYPE: ICD-10-CM

## 2019-02-20 DIAGNOSIS — R50.81 FEVER IN OTHER DISEASES: ICD-10-CM

## 2019-02-20 LAB
EXPIRATION DATE: NORMAL
FLUAV AG NPH QL: NEGATIVE
FLUBV AG NPH QL: NEGATIVE
INTERNAL CONTROL: NORMAL
Lab: NORMAL

## 2019-02-20 PROCEDURE — 87804 INFLUENZA ASSAY W/OPTIC: CPT | Performed by: NURSE PRACTITIONER

## 2019-02-20 PROCEDURE — 96372 THER/PROPH/DIAG INJ SC/IM: CPT | Performed by: NURSE PRACTITIONER

## 2019-02-20 PROCEDURE — 99213 OFFICE O/P EST LOW 20 MIN: CPT | Performed by: NURSE PRACTITIONER

## 2019-02-20 RX ORDER — TRIAMCINOLONE ACETONIDE 40 MG/ML
60 INJECTION, SUSPENSION INTRA-ARTICULAR; INTRAMUSCULAR ONCE
Status: COMPLETED | OUTPATIENT
Start: 2019-02-20 | End: 2019-02-20

## 2019-02-20 RX ORDER — CLARITHROMYCIN 500 MG/1
500 TABLET, COATED ORAL 2 TIMES DAILY
Qty: 20 TABLET | Refills: 0 | Status: SHIPPED | OUTPATIENT
Start: 2019-02-20 | End: 2019-03-09

## 2019-02-20 RX ADMIN — TRIAMCINOLONE ACETONIDE 60 MG: 40 INJECTION, SUSPENSION INTRA-ARTICULAR; INTRAMUSCULAR at 10:30

## 2019-02-20 NOTE — PROGRESS NOTES
Chief Complaint   Patient presents with   • Nasal Congestion   • Sinusitis   • Earache     Subjective   Nirmala Tamayo is a 24 y.o. female.     Sinusitis   This is a new problem. The current episode started today. The problem has been gradually worsening since onset. The maximum temperature recorded prior to her arrival was 100.4 - 100.9 F. The fever has been present for less than 1 day. Her pain is at a severity of 4/10. The pain is moderate. Associated symptoms include congestion, coughing, diaphoresis, ear pain, headaches, sinus pressure and sneezing. Pertinent negatives include no chills, hoarse voice, neck pain, shortness of breath, sore throat or swollen glands. Past treatments include antibiotics. The treatment provided mild relief.   Earache    There is pain in the left ear. This is a new problem. The current episode started in the past 7 days. The problem has been gradually worsening. The maximum temperature recorded prior to her arrival was 101 - 101.9 F. The fever has been present for 3 to 4 days. The pain is at a severity of 5/10. The pain is moderate. Associated symptoms include coughing, headaches and rhinorrhea. Pertinent negatives include no abdominal pain, diarrhea, ear discharge, hearing loss, neck pain, rash, sore throat or vomiting. She has tried antibiotics, ear drops, heat packs, cold packs and NSAIDs for the symptoms.        The following portions of the patient's history were reviewed and updated as appropriate: allergies, current medications, past social history and problem list.    Review of Systems   Constitutional: Positive for activity change, appetite change, diaphoresis, fatigue and fever. Negative for chills and unexpected weight change.   HENT: Positive for congestion, ear pain, postnasal drip, rhinorrhea, sinus pressure, sinus pain and sneezing. Negative for dental problem, drooling, ear discharge, hearing loss, hoarse voice, nosebleeds and sore throat.    Eyes: Negative  "for photophobia, pain, discharge, redness, itching and visual disturbance.   Respiratory: Positive for cough. Negative for apnea, choking, chest tightness, shortness of breath, wheezing and stridor.    Cardiovascular: Negative for chest pain, palpitations and leg swelling.   Gastrointestinal: Negative for abdominal pain, diarrhea and vomiting.   Endocrine: Negative.  Negative for cold intolerance, heat intolerance, polydipsia, polyphagia and polyuria.   Genitourinary: Negative.  Negative for difficulty urinating, dyspareunia and dysuria.   Musculoskeletal: Negative for neck pain.   Skin: Negative for rash.   Allergic/Immunologic: Negative.  Negative for environmental allergies, food allergies and immunocompromised state.   Neurological: Positive for headaches. Negative for weakness.   Hematological: Negative.    Psychiatric/Behavioral: Negative.        Objective   /78   Temp 100.2 °F (37.9 °C) (Tympanic)   Ht 175.3 cm (69\")   Wt 68.5 kg (151 lb)   SpO2 100%   BMI 22.30 kg/m²   Physical Exam   Constitutional: She is oriented to person, place, and time. She appears well-developed. No distress.   HENT:   Head: Normocephalic.   Right Ear: External ear normal.   Nose: Mucosal edema and rhinorrhea present.   Mouth/Throat: Oropharynx is clear and moist. No oropharyngeal exudate.   Fluid level left ear with redness noted    Eyes: Pupils are equal, round, and reactive to light. Right eye exhibits no discharge. Left eye exhibits no discharge. No scleral icterus.   Neck: Normal range of motion. No thyromegaly present.   Cardiovascular: Normal rate, regular rhythm, normal heart sounds and intact distal pulses. Exam reveals no gallop and no friction rub.   No murmur heard.  Pulmonary/Chest: Effort normal and breath sounds normal. No stridor. No respiratory distress. She has no wheezes. She has no rales. She exhibits no tenderness.   Abdominal: Soft. She exhibits no distension and no mass. There is no tenderness. There " is no rebound and no guarding. No hernia.   Musculoskeletal: Normal range of motion.   Neurological: She is alert and oriented to person, place, and time. She has normal reflexes. She displays normal reflexes. No cranial nerve deficit or sensory deficit. She exhibits normal muscle tone. Coordination normal.   Skin: Skin is warm and dry. No rash noted. She is not diaphoretic. No erythema. No pallor.   Psychiatric: She has a normal mood and affect.       Assessment/Plan   Problem List Items Addressed This Visit        Respiratory    Acute upper respiratory infection - Primary    Relevant Medications    clarithromycin (BIAXIN) 500 MG tablet    triamcinolone acetonide (KENALOG-40) injection 60 mg (Completed)       Nervous and Auditory    Left otitis media       Other    Fever    Relevant Orders    POC Influenza A / B (Completed)           New Medications Ordered This Visit   Medications   • clarithromycin (BIAXIN) 500 MG tablet     Sig: Take 1 tablet by mouth 2 (Two) Times a Day.     Dispense:  20 tablet     Refill:  0   • triamcinolone acetonide (KENALOG-40) injection 60 mg      meds as directed, diet discussed, follow up if worsen, add mucinex and claritin to treat symptoms

## 2019-02-22 ENCOUNTER — TELEPHONE (OUTPATIENT)
Dept: FAMILY MEDICINE CLINIC | Facility: CLINIC | Age: 24
End: 2019-02-22

## 2019-02-25 RX ORDER — FLUCONAZOLE 150 MG/1
150 TABLET ORAL ONCE
Qty: 1 TABLET | Refills: 1 | Status: SHIPPED | OUTPATIENT
Start: 2019-02-25 | End: 2019-02-25

## 2019-04-15 ENCOUNTER — APPOINTMENT (OUTPATIENT)
Dept: LAB | Facility: HOSPITAL | Age: 24
End: 2019-04-15

## 2019-04-15 DIAGNOSIS — N92.6 IRREGULAR MENSES: Primary | ICD-10-CM

## 2019-04-15 LAB
25(OH)D3 SERPL-MCNC: 28.2 NG/ML (ref 30–100)
ALBUMIN SERPL-MCNC: 4.2 G/DL (ref 3.5–5.2)
ALBUMIN/GLOB SERPL: 1.3 G/DL
ALP SERPL-CCNC: 49 U/L (ref 39–117)
ALT SERPL W P-5'-P-CCNC: 14 U/L (ref 1–33)
ANION GAP SERPL CALCULATED.3IONS-SCNC: 14.3 MMOL/L
AST SERPL-CCNC: 18 U/L (ref 1–32)
BASOPHILS # BLD AUTO: 0.03 10*3/MM3 (ref 0–0.2)
BASOPHILS NFR BLD AUTO: 0.7 % (ref 0–1.5)
BILIRUB SERPL-MCNC: 0.2 MG/DL (ref 0.2–1.2)
BUN BLD-MCNC: 9 MG/DL (ref 6–20)
BUN/CREAT SERPL: 13.6 (ref 7–25)
CALCIUM SPEC-SCNC: 9.7 MG/DL (ref 8.6–10.5)
CHLORIDE SERPL-SCNC: 101 MMOL/L (ref 98–107)
CO2 SERPL-SCNC: 25.7 MMOL/L (ref 22–29)
CREAT BLD-MCNC: 0.66 MG/DL (ref 0.57–1)
DEPRECATED RDW RBC AUTO: 42.6 FL (ref 37–54)
EOSINOPHIL # BLD AUTO: 0.02 10*3/MM3 (ref 0–0.4)
EOSINOPHIL NFR BLD AUTO: 0.4 % (ref 0.3–6.2)
ERYTHROCYTE [DISTWIDTH] IN BLOOD BY AUTOMATED COUNT: 14.6 % (ref 12.3–15.4)
GFR SERPL CREATININE-BSD FRML MDRD: 110 ML/MIN/1.73
GLOBULIN UR ELPH-MCNC: 3.2 GM/DL
GLUCOSE BLD-MCNC: 77 MG/DL (ref 65–99)
HCT VFR BLD AUTO: 38.5 % (ref 34–46.6)
HGB BLD-MCNC: 12.4 G/DL (ref 12–15.9)
IMM GRANULOCYTES # BLD AUTO: 0.01 10*3/MM3 (ref 0–0.05)
IMM GRANULOCYTES NFR BLD AUTO: 0.2 % (ref 0–0.5)
IRON 24H UR-MRATE: 26 MCG/DL (ref 37–145)
LYMPHOCYTES # BLD AUTO: 1.34 10*3/MM3 (ref 0.7–3.1)
LYMPHOCYTES NFR BLD AUTO: 29.9 % (ref 19.6–45.3)
MCH RBC QN AUTO: 26.2 PG (ref 26.6–33)
MCHC RBC AUTO-ENTMCNC: 32.2 G/DL (ref 31.5–35.7)
MCV RBC AUTO: 81.2 FL (ref 79–97)
MONOCYTES # BLD AUTO: 0.35 10*3/MM3 (ref 0.1–0.9)
MONOCYTES NFR BLD AUTO: 7.8 % (ref 5–12)
NEUTROPHILS # BLD AUTO: 2.73 10*3/MM3 (ref 1.4–7)
NEUTROPHILS NFR BLD AUTO: 61 % (ref 42.7–76)
NRBC BLD AUTO-RTO: 0 /100 WBC (ref 0–0)
PLATELET # BLD AUTO: 210 10*3/MM3 (ref 140–450)
PMV BLD AUTO: 11.4 FL (ref 6–12)
POTASSIUM BLD-SCNC: 4 MMOL/L (ref 3.5–5.2)
PROT SERPL-MCNC: 7.4 G/DL (ref 6–8.5)
RBC # BLD AUTO: 4.74 10*6/MM3 (ref 3.77–5.28)
SODIUM BLD-SCNC: 141 MMOL/L (ref 136–145)
TSH SERPL DL<=0.05 MIU/L-ACNC: 0.9 MIU/ML (ref 0.27–4.2)
VIT B12 BLD-MCNC: 397 PG/ML (ref 211–946)
WBC NRBC COR # BLD: 4.48 10*3/MM3 (ref 3.4–10.8)

## 2019-04-15 PROCEDURE — 36415 COLL VENOUS BLD VENIPUNCTURE: CPT | Performed by: NURSE PRACTITIONER

## 2019-04-15 PROCEDURE — 85025 COMPLETE CBC W/AUTO DIFF WBC: CPT | Performed by: NURSE PRACTITIONER

## 2019-04-15 PROCEDURE — 84443 ASSAY THYROID STIM HORMONE: CPT | Performed by: NURSE PRACTITIONER

## 2019-04-15 PROCEDURE — 83540 ASSAY OF IRON: CPT | Performed by: NURSE PRACTITIONER

## 2019-04-15 PROCEDURE — 82607 VITAMIN B-12: CPT | Performed by: NURSE PRACTITIONER

## 2019-04-15 PROCEDURE — 80053 COMPREHEN METABOLIC PANEL: CPT | Performed by: NURSE PRACTITIONER

## 2019-04-15 PROCEDURE — 82306 VITAMIN D 25 HYDROXY: CPT | Performed by: NURSE PRACTITIONER

## 2019-04-17 RX ORDER — ERGOCALCIFEROL (VITAMIN D2) 50 MCG
1 CAPSULE ORAL DAILY
Qty: 30 CAPSULE | Refills: 11 | Status: SHIPPED | OUTPATIENT
Start: 2019-04-17 | End: 2019-05-29

## 2019-05-30 ENCOUNTER — TELEPHONE (OUTPATIENT)
Dept: FAMILY MEDICINE CLINIC | Facility: CLINIC | Age: 24
End: 2019-05-30

## 2019-05-30 NOTE — TELEPHONE ENCOUNTER
Nirmala has a place on her back that could be a tick bite, but she doesn't think it looks like that, but possibly a black mole.  I offered to see if Angel could see her, but she really wants to see you.  You have openings on Monday, but she didn't know if she could wait.  Please advise?

## 2019-06-14 ENCOUNTER — OFFICE VISIT (OUTPATIENT)
Dept: FAMILY MEDICINE CLINIC | Facility: CLINIC | Age: 24
End: 2019-06-14

## 2019-06-14 VITALS
BODY MASS INDEX: 21.62 KG/M2 | HEIGHT: 69 IN | SYSTOLIC BLOOD PRESSURE: 110 MMHG | WEIGHT: 146 LBS | DIASTOLIC BLOOD PRESSURE: 82 MMHG

## 2019-06-14 DIAGNOSIS — L70.0 ACNE VULGARIS: Primary | ICD-10-CM

## 2019-06-14 DIAGNOSIS — F41.9 ANXIETY: ICD-10-CM

## 2019-06-14 DIAGNOSIS — Z30.41 ENCOUNTER FOR SURVEILLANCE OF CONTRACEPTIVE PILLS: ICD-10-CM

## 2019-06-14 PROCEDURE — 99214 OFFICE O/P EST MOD 30 MIN: CPT | Performed by: NURSE PRACTITIONER

## 2019-06-14 RX ORDER — NORGESTIMATE AND ETHINYL ESTRADIOL 7DAYSX3 LO
1 KIT ORAL DAILY
Qty: 28 TABLET | Refills: 12 | OUTPATIENT
Start: 2019-06-14 | End: 2019-08-01

## 2019-06-14 NOTE — PROGRESS NOTES
Chief Complaint   Patient presents with   • Acne     discuss meds    • Contraception     wanting to discuss meds     Subjective   Nirmala Tamayo is a 24 y.o. female.     Rash   This is a new problem. The current episode started 1 to 4 weeks ago. The problem has been gradually worsening since onset. The affected locations include the face. Rash characteristics: Closed comedones. Pertinent negatives include no anorexia, congestion, cough, diarrhea, eye pain, facial edema, fatigue, fever, joint pain, nail changes, rhinorrhea, shortness of breath, sore throat or vomiting. Past treatments include nothing. The treatment provided mild relief. There is no history of allergies, asthma, eczema or varicella.   Contraception   This is a new problem. The current episode started today. Associated symptoms include a rash. Pertinent negatives include no abdominal pain, anorexia, arthralgias, change in bowel habit, chest pain, chills, congestion, coughing, diaphoresis, fatigue, fever, headaches, joint swelling, myalgias, nausea, neck pain, numbness, sore throat, swollen glands, urinary symptoms, vertigo, visual change, vomiting or weakness. She has tried nothing for the symptoms.   Anxiety   Presents for initial visit. Onset was 1 to 6 months ago. The problem has been waxing and waning. Symptoms include excessive worry and nervous/anxious behavior (Starting college classes in August- pt does not want medication at this time). Patient reports no chest pain, compulsions, confusion, decreased concentration, depressed mood, dizziness, dry mouth, feeling of choking, hyperventilation, impotence, insomnia, irritability, malaise, muscle tension, nausea, obsessions, palpitations, panic, restlessness, shortness of breath or suicidal ideas. Symptoms occur occasionally. The severity of symptoms is mild. The symptoms are aggravated by work stress.     Risk factors include a major life event. There is no history of anemia,  anxiety/panic attacks, arrhythmia, asthma, bipolar disorder, CAD, CHF, chronic lung disease, depression, fibromyalgia, hyperthyroidism or suicide attempts. Past treatments include nothing.        The following portions of the patient's history were reviewed and updated as appropriate: allergies, current medications, past social history and problem list.    Review of Systems   Constitutional: Negative.  Negative for activity change, appetite change, chills, diaphoresis, fatigue, fever, irritability and unexpected weight change.   HENT: Negative.  Negative for congestion, dental problem, drooling, ear discharge, ear pain, facial swelling, hearing loss, mouth sores, nosebleeds, postnasal drip, rhinorrhea, sinus pressure, sinus pain, sneezing, sore throat, tinnitus, trouble swallowing and voice change.    Eyes: Negative.  Negative for photophobia, pain, discharge, redness, itching and visual disturbance.   Respiratory: Negative.  Negative for apnea, cough, choking, chest tightness, shortness of breath, wheezing and stridor.    Cardiovascular: Negative.  Negative for chest pain, palpitations and leg swelling.   Gastrointestinal: Negative.  Negative for abdominal distention, abdominal pain, anal bleeding, anorexia, blood in stool, change in bowel habit, constipation, diarrhea, nausea, rectal pain and vomiting.   Endocrine: Negative.  Negative for cold intolerance, heat intolerance, polydipsia, polyphagia and polyuria.   Genitourinary: Negative.  Negative for decreased urine volume, difficulty urinating, dyspareunia, dysuria, enuresis, flank pain, frequency, genital sores, hematuria, impotence, menstrual problem, pelvic pain, urgency, vaginal bleeding, vaginal discharge and vaginal pain.   Musculoskeletal: Negative.  Negative for arthralgias, back pain, gait problem, joint pain, joint swelling, myalgias, neck pain and neck stiffness.   Skin: Positive for rash. Negative for color change, nail changes, pallor and wound.       "  Acne   Allergic/Immunologic: Negative.  Negative for environmental allergies, food allergies and immunocompromised state.   Neurological: Negative.  Negative for dizziness, vertigo, tremors, seizures, syncope, facial asymmetry, speech difficulty, weakness, light-headedness, numbness and headaches.   Hematological: Negative.  Negative for adenopathy. Does not bruise/bleed easily.   Psychiatric/Behavioral: Negative for agitation, behavioral problems, confusion, decreased concentration, dysphoric mood, hallucinations, self-injury, sleep disturbance and suicidal ideas. The patient is nervous/anxious (Starting college classes in August- pt does not want medication at this time). The patient does not have insomnia and is not hyperactive.        Objective   /82   Ht 175.3 cm (69\")   Wt 66.2 kg (146 lb)   BMI 21.56 kg/m²   Physical Exam   Constitutional: She is oriented to person, place, and time. She appears well-developed and well-nourished. No distress.   HENT:   Head: Normocephalic and atraumatic.   Right Ear: External ear normal.   Left Ear: External ear normal.   Mouth/Throat: Oropharynx is clear and moist. No oropharyngeal exudate.   Eyes: EOM are normal. Pupils are equal, round, and reactive to light.   Neck: Normal range of motion. Neck supple. No JVD present. No tracheal deviation present. No thyromegaly present.   Cardiovascular: Normal rate, regular rhythm, normal heart sounds and intact distal pulses. Exam reveals no gallop and no friction rub.   No murmur heard.  Pulmonary/Chest: Effort normal and breath sounds normal. No stridor. No respiratory distress. She has no wheezes. She has no rales. She exhibits no tenderness.   Abdominal: Soft. Bowel sounds are normal. She exhibits no distension and no mass. There is no tenderness. There is no rebound and no guarding. No hernia.   Musculoskeletal: Normal range of motion.   Lymphadenopathy:     She has no cervical adenopathy.   Neurological: She is alert " and oriented to person, place, and time. She displays normal reflexes. No cranial nerve deficit or sensory deficit. She exhibits normal muscle tone. Coordination normal.   Skin: Skin is warm, dry and intact. Rash noted. She is not diaphoretic.   Rash to face-acne-closed comedones   Psychiatric: She has a normal mood and affect. Her behavior is normal. Judgment and thought content normal.   Nursing note and vitals reviewed.      Assessment/Plan   Problem List Items Addressed This Visit        Other    Anxiety    Relevant Orders    CBC & Differential    Comprehensive Metabolic Panel    Iron    TSH    Vitamin B12    Vitamin D 25 Hydroxy    Magnesium      Other Visit Diagnoses     Acne vulgaris    -  Primary    Encounter for surveillance of contraceptive pills               New Medications Ordered This Visit   Medications   • norgestimate-ethinyl estradiol (ORTHO TRI-CYCLEN LO) 0.18/0.215/0.25 MG-25 MCG per tablet     Sig: Take 1 tablet by mouth Daily.     Dispense:  28 tablet     Refill:  12       It's not just what you eat, but when you eat  Eat breakfast, and eat smaller meals throughout the day. A healthy breakfast can jumpstart your metabolism, while eating small, healthy meals (rather than the standard three large meals) keeps your energy up.   Avoid eating at night. Try to eat dinner earlier and fast for 14-16 hours until breakfast the next morning. Studies suggest that eating only when you’re most active and giving your digestive system a long break each day may help to regulate weight.     Ortho Tri-Cyclen Lo, meds as directed, labs as directed, diet discussed, encouraged to contact office if pt decides she wants medication for anxiety

## 2019-06-21 ENCOUNTER — APPOINTMENT (OUTPATIENT)
Dept: LAB | Facility: HOSPITAL | Age: 24
End: 2019-06-21

## 2019-06-21 LAB
25(OH)D3 SERPL-MCNC: 31 NG/ML (ref 30–100)
ALBUMIN SERPL-MCNC: 4.5 G/DL (ref 3.5–5.2)
ALBUMIN/GLOB SERPL: 1.7 G/DL
ALP SERPL-CCNC: 57 U/L (ref 39–117)
ALT SERPL W P-5'-P-CCNC: 10 U/L (ref 1–33)
ANION GAP SERPL CALCULATED.3IONS-SCNC: 10.7 MMOL/L
AST SERPL-CCNC: 16 U/L (ref 1–32)
BASOPHILS # BLD AUTO: 0.03 10*3/MM3 (ref 0–0.2)
BASOPHILS NFR BLD AUTO: 0.4 % (ref 0–1.5)
BILIRUB SERPL-MCNC: 0.3 MG/DL (ref 0.2–1.2)
BUN BLD-MCNC: 13 MG/DL (ref 6–20)
BUN/CREAT SERPL: 18.6 (ref 7–25)
CALCIUM SPEC-SCNC: 9.3 MG/DL (ref 8.6–10.5)
CHLORIDE SERPL-SCNC: 103 MMOL/L (ref 98–107)
CO2 SERPL-SCNC: 27.3 MMOL/L (ref 22–29)
CREAT BLD-MCNC: 0.7 MG/DL (ref 0.57–1)
DEPRECATED RDW RBC AUTO: 44.6 FL (ref 37–54)
EOSINOPHIL # BLD AUTO: 0.07 10*3/MM3 (ref 0–0.4)
EOSINOPHIL NFR BLD AUTO: 0.9 % (ref 0.3–6.2)
ERYTHROCYTE [DISTWIDTH] IN BLOOD BY AUTOMATED COUNT: 14.8 % (ref 12.3–15.4)
GFR SERPL CREATININE-BSD FRML MDRD: 103 ML/MIN/1.73
GLOBULIN UR ELPH-MCNC: 2.7 GM/DL
GLUCOSE BLD-MCNC: 84 MG/DL (ref 65–99)
HCT VFR BLD AUTO: 37.4 % (ref 34–46.6)
HGB BLD-MCNC: 11.6 G/DL (ref 12–15.9)
IMM GRANULOCYTES # BLD AUTO: 0.02 10*3/MM3 (ref 0–0.05)
IMM GRANULOCYTES NFR BLD AUTO: 0.3 % (ref 0–0.5)
IRON 24H UR-MRATE: 47 MCG/DL (ref 37–145)
LYMPHOCYTES # BLD AUTO: 1.88 10*3/MM3 (ref 0.7–3.1)
LYMPHOCYTES NFR BLD AUTO: 25 % (ref 19.6–45.3)
MAGNESIUM SERPL-MCNC: 1.9 MG/DL (ref 1.6–2.6)
MCH RBC QN AUTO: 25.7 PG (ref 26.6–33)
MCHC RBC AUTO-ENTMCNC: 31 G/DL (ref 31.5–35.7)
MCV RBC AUTO: 82.7 FL (ref 79–97)
MONOCYTES # BLD AUTO: 0.48 10*3/MM3 (ref 0.1–0.9)
MONOCYTES NFR BLD AUTO: 6.4 % (ref 5–12)
NEUTROPHILS # BLD AUTO: 5.05 10*3/MM3 (ref 1.7–7)
NEUTROPHILS NFR BLD AUTO: 67 % (ref 42.7–76)
NRBC BLD AUTO-RTO: 0 /100 WBC (ref 0–0.2)
PLATELET # BLD AUTO: 202 10*3/MM3 (ref 140–450)
PMV BLD AUTO: 11 FL (ref 6–12)
POTASSIUM BLD-SCNC: 4.3 MMOL/L (ref 3.5–5.2)
PROT SERPL-MCNC: 7.2 G/DL (ref 6–8.5)
RBC # BLD AUTO: 4.52 10*6/MM3 (ref 3.77–5.28)
SODIUM BLD-SCNC: 141 MMOL/L (ref 136–145)
TSH SERPL DL<=0.05 MIU/L-ACNC: 0.99 MIU/ML (ref 0.27–4.2)
WBC NRBC COR # BLD: 7.53 10*3/MM3 (ref 3.4–10.8)

## 2019-06-21 PROCEDURE — 82607 VITAMIN B-12: CPT | Performed by: NURSE PRACTITIONER

## 2019-06-21 PROCEDURE — 83540 ASSAY OF IRON: CPT | Performed by: NURSE PRACTITIONER

## 2019-06-21 PROCEDURE — 84443 ASSAY THYROID STIM HORMONE: CPT | Performed by: NURSE PRACTITIONER

## 2019-06-21 PROCEDURE — 80053 COMPREHEN METABOLIC PANEL: CPT | Performed by: NURSE PRACTITIONER

## 2019-06-21 PROCEDURE — 82306 VITAMIN D 25 HYDROXY: CPT | Performed by: NURSE PRACTITIONER

## 2019-06-21 PROCEDURE — 83735 ASSAY OF MAGNESIUM: CPT | Performed by: NURSE PRACTITIONER

## 2019-06-21 PROCEDURE — 36415 COLL VENOUS BLD VENIPUNCTURE: CPT | Performed by: NURSE PRACTITIONER

## 2019-06-21 PROCEDURE — 85025 COMPLETE CBC W/AUTO DIFF WBC: CPT | Performed by: NURSE PRACTITIONER

## 2019-06-22 LAB — VIT B12 BLD-MCNC: 369 PG/ML (ref 211–946)

## 2019-06-24 ENCOUNTER — TELEPHONE (OUTPATIENT)
Dept: FAMILY MEDICINE CLINIC | Facility: CLINIC | Age: 24
End: 2019-06-24

## 2019-06-24 NOTE — PROGRESS NOTES
Per JOSE CARLOS Watkins, Ms. Tamayo has been called with recent lab results & recommendations.  Continue current medications and follow-up as planned or sooner if any problems.

## 2019-06-24 NOTE — TELEPHONE ENCOUNTER
Per JOSE CARLOS Watkins, Ms. Tamayo has been called with recent lab results & recommendations.  Continue current medications and follow-up as planned or sooner if any problems.    ----- Message from JOSE CARLOS Skaggs sent at 6/24/2019  7:51 AM CDT -----  Can you let her know labs look good no problems noted

## 2019-07-22 ENCOUNTER — CLINICAL SUPPORT (OUTPATIENT)
Dept: FAMILY MEDICINE CLINIC | Facility: CLINIC | Age: 24
End: 2019-07-22

## 2019-07-22 DIAGNOSIS — Z11.1 SCREENING FOR TUBERCULOSIS: Primary | ICD-10-CM

## 2019-07-22 PROCEDURE — 86580 TB INTRADERMAL TEST: CPT | Performed by: NURSE PRACTITIONER

## 2019-07-24 LAB
INDURATION: 0 MM (ref 0–10)
TB SKIN TEST: NEGATIVE

## 2019-08-30 ENCOUNTER — APPOINTMENT (OUTPATIENT)
Dept: LAB | Facility: HOSPITAL | Age: 24
End: 2019-08-30

## 2019-08-30 ENCOUNTER — OFFICE VISIT (OUTPATIENT)
Dept: FAMILY MEDICINE CLINIC | Facility: CLINIC | Age: 24
End: 2019-08-30

## 2019-08-30 VITALS
SYSTOLIC BLOOD PRESSURE: 114 MMHG | DIASTOLIC BLOOD PRESSURE: 80 MMHG | HEIGHT: 69 IN | WEIGHT: 149 LBS | OXYGEN SATURATION: 98 % | HEART RATE: 74 BPM | BODY MASS INDEX: 22.07 KG/M2

## 2019-08-30 DIAGNOSIS — R59.9 SWOLLEN LYMPH NODES: ICD-10-CM

## 2019-08-30 DIAGNOSIS — R31.9 HEMATURIA, UNSPECIFIED TYPE: Primary | ICD-10-CM

## 2019-08-30 LAB
BASOPHILS # BLD AUTO: 0.05 10*3/MM3 (ref 0–0.2)
BASOPHILS NFR BLD AUTO: 0.9 % (ref 0–1.5)
BILIRUB BLD-MCNC: NEGATIVE MG/DL
CLARITY, POC: CLEAR
COLOR UR: YELLOW
DEPRECATED RDW RBC AUTO: 46.4 FL (ref 37–54)
EOSINOPHIL # BLD AUTO: 0.05 10*3/MM3 (ref 0–0.4)
EOSINOPHIL NFR BLD AUTO: 0.9 % (ref 0.3–6.2)
ERYTHROCYTE [DISTWIDTH] IN BLOOD BY AUTOMATED COUNT: 15.9 % (ref 12.3–15.4)
GLUCOSE UR STRIP-MCNC: NEGATIVE MG/DL
HCT VFR BLD AUTO: 41 % (ref 34–46.6)
HGB BLD-MCNC: 13 G/DL (ref 12–15.9)
IMM GRANULOCYTES # BLD AUTO: 0.01 10*3/MM3 (ref 0–0.05)
IMM GRANULOCYTES NFR BLD AUTO: 0.2 % (ref 0–0.5)
KETONES UR QL: NEGATIVE
LEUKOCYTE EST, POC: NEGATIVE
LYMPHOCYTES # BLD AUTO: 2.05 10*3/MM3 (ref 0.7–3.1)
LYMPHOCYTES NFR BLD AUTO: 38 % (ref 19.6–45.3)
MCH RBC QN AUTO: 25.6 PG (ref 26.6–33)
MCHC RBC AUTO-ENTMCNC: 31.7 G/DL (ref 31.5–35.7)
MCV RBC AUTO: 80.9 FL (ref 79–97)
MONOCYTES # BLD AUTO: 0.47 10*3/MM3 (ref 0.1–0.9)
MONOCYTES NFR BLD AUTO: 8.7 % (ref 5–12)
NEUTROPHILS # BLD AUTO: 2.77 10*3/MM3 (ref 1.7–7)
NEUTROPHILS NFR BLD AUTO: 51.3 % (ref 42.7–76)
NITRITE UR-MCNC: NEGATIVE MG/ML
NRBC BLD AUTO-RTO: 0 /100 WBC (ref 0–0.2)
PH UR: 5 [PH] (ref 5–8)
PLATELET # BLD AUTO: 205 10*3/MM3 (ref 140–450)
PMV BLD AUTO: 11 FL (ref 6–12)
PROT UR STRIP-MCNC: NEGATIVE MG/DL
RBC # BLD AUTO: 5.07 10*6/MM3 (ref 3.77–5.28)
RBC # UR STRIP: NEGATIVE /UL
SP GR UR: 1 (ref 1–1.03)
UROBILINOGEN UR QL: NORMAL
WBC NRBC COR # BLD: 5.4 10*3/MM3 (ref 3.4–10.8)

## 2019-08-30 PROCEDURE — 81002 URINALYSIS NONAUTO W/O SCOPE: CPT | Performed by: FAMILY MEDICINE

## 2019-08-30 PROCEDURE — 99213 OFFICE O/P EST LOW 20 MIN: CPT | Performed by: FAMILY MEDICINE

## 2019-08-30 PROCEDURE — 36415 COLL VENOUS BLD VENIPUNCTURE: CPT | Performed by: FAMILY MEDICINE

## 2019-08-30 PROCEDURE — 85025 COMPLETE CBC W/AUTO DIFF WBC: CPT | Performed by: FAMILY MEDICINE

## 2019-08-30 NOTE — PROGRESS NOTES
Chief Complaint   Patient presents with   • Blood in Urine     recheck   • Adenopathy       Subjective:  Nirmala Tamayo is a 24 y.o. female who presents for today due to suspected swollen lymph node under her mandible on the right.  Patient first noticed this 3 days ago.  Reports she noticed fullness and some mild tenderness under her right jaw.  This is intermittent in nature.  Currently not swollen.  She has not had any recent illness or infections.  No skin lesions or sores.  Denies any fevers or chills.  No night sweats or weight loss.  No dental pain or caries.  No neck pain or stiffness.  No other complaints at this time.    Patient also had a UA around 1 to 2 weeks ago that did show some trace blood.  She was told to follow-up on this and would like to have a repeat UA today.  She has not had any back or flank pain.  No dysuria, frequency, urgency.  No foul-smelling urine or other symptoms at this time.    The following portions of the patient's history were reviewed and updated as appropriate: allergies, current medications, past family history, past medical history, past social history, past surgical history and problem list.      Past Medical History:   Diagnosis Date   • Acute allergic reaction    • Acute bronchitis    • Acute pharyngitis    • Agoraphobia with panic attacks    • Allergic rhinitis    • Anemia    • Anxiety    • Anxiety state    • Asthma     Stable   • Back strain    • Constipation    • Cough    • Disturbance of skin sensation    • Gestational hypertension     with second pregnancy   • Headache    • History of transfusion     after second delivery   • HPV (human papilloma virus) infection    • Hx of preeclampsia, prior pregnancy, currently pregnant 1/2/2018   • Irregular periods    • Irritable bowel syndrome with constipation    • Low back pain    • Lumbosacral dysfunction    • Neck pain    • Palpitations    • Rh negative state in antepartum period, third trimester 12/7/2017   •  Rhinitis    • Severe depression (CMS/HCC)    • Spasm     cervical spasm   • Upper respiratory infection    • Urinary tract infection 7/14/2017   • Vaginal irritation    • Vitreous floaters     prob, not seen on exam   • Wheezing          Current Outpatient Medications:   •  albuterol (PROVENTIL HFA;VENTOLIN HFA) 108 (90 Base) MCG/ACT inhaler, Inhale 2 puffs Every 4 (Four) Hours As Needed for Wheezing or Shortness of Air., Disp: 1 inhaler, Rfl: 1  •  clonazePAM (KLONOPIN) 0.5 MG tablet, Take 1 tablet by mouth 3 (Three) Times a Day As Needed for Anxiety., Disp: 90 tablet, Rfl: 0  •  EPINEPHrine (EPIPEN 2-EMILIANA) 0.3 MG/0.3ML solution auto-injector injection, as needed., Disp: , Rfl:   •  linaclotide (LINZESS) 145 MCG capsule capsule, Take 1 capsule by mouth Every Morning Before Breakfast., Disp: 90 capsule, Rfl: 3  •  loratadine (CLARITIN) 10 MG tablet, Take 1 tablet by mouth Daily., Disp: 30 tablet, Rfl: 0    Review of Systems    Review of Systems   Constitutional: Negative for activity change, chills, fever and unexpected weight change.   HENT: Negative for hearing loss, rhinorrhea and sore throat.    Eyes: Negative for visual disturbance.   Respiratory: Negative for cough and shortness of breath.    Cardiovascular: Negative for chest pain, palpitations and leg swelling.   Gastrointestinal: Negative for abdominal pain, blood in stool, constipation, diarrhea, nausea and vomiting.   Endocrine: Negative for polydipsia, polyphagia and polyuria.   Genitourinary: Negative for dysuria, frequency, hematuria and urgency.   Musculoskeletal: Negative for arthralgias and myalgias.   Skin: Negative for rash.   Neurological: Negative for dizziness, light-headedness, numbness and headaches.   Hematological: Positive for adenopathy.   Psychiatric/Behavioral: Negative for sleep disturbance and suicidal ideas.       Objective  Vitals:    08/30/19 1041   BP: 114/80   Pulse: 74   SpO2: 98%   Weight: 67.6 kg (149 lb)   Height: 175.3 cm  "(69\")       Physical Exam   Constitutional: She is oriented to person, place, and time. She appears well-developed and well-nourished. No distress.   HENT:   Head: Normocephalic and atraumatic.   Right Ear: External ear normal.   Left Ear: External ear normal.   Mouth/Throat: Oropharynx is clear and moist.   Eyes: Conjunctivae and EOM are normal. Pupils are equal, round, and reactive to light. Right eye exhibits no discharge. Left eye exhibits no discharge. No scleral icterus.   Neck: Trachea normal, normal range of motion and full passive range of motion without pain. Neck supple. No tracheal tenderness and no spinous process tenderness present. No neck rigidity. No tracheal deviation, no edema, no erythema and normal range of motion present. No thyromegaly present.   Cardiovascular: Normal rate, regular rhythm and normal heart sounds. Exam reveals no gallop and no friction rub.   No murmur heard.  Pulmonary/Chest: Effort normal and breath sounds normal. No stridor. No respiratory distress. She has no wheezes. She has no rales.   Musculoskeletal: She exhibits no edema.   Lymphadenopathy:        Head (right side): No submental, no submandibular, no tonsillar and no preauricular adenopathy present.        Head (left side): No submental, no submandibular, no tonsillar and no preauricular adenopathy present.     She has no cervical adenopathy.        Right cervical: No superficial cervical adenopathy present.       Left cervical: No superficial cervical adenopathy present.   Neurological: She is alert and oriented to person, place, and time. No cranial nerve deficit. She exhibits normal muscle tone.   Skin: Skin is warm and dry. No rash noted. She is not diaphoretic. No erythema.   Psychiatric: She has a normal mood and affect. Her behavior is normal. Judgment and thought content normal.   Nursing note and vitals reviewed.        Nirmala was seen today for blood in urine and adenopathy.    Diagnoses and all orders for " this visit:    Hematuria, unspecified type  -     POCT urinalysis dipstick, manual  Resolved.  UA unremarkable.    Swollen lymph nodes  -     Cancel: CBC w AUTO Differential; Future  -     CBC w AUTO Differential  -     CBC Auto Differential  No swollen lymph node appreciated on exam today.  No known recent illnesses or infections.  Will get CBC to reassure patient.  Instructed patient to follow-up if symptoms return or fail to improve.  Patient agreeable with plan verbalized understanding.          I spent 15 minutes in direct face to face contact with patient.  Greater than 50% of this time was spent counseling patient and discussing plan of care.    No flowsheet data found.        This document has been electronically signed by Aleksandar Nix DO on August 30, 2019 12:53 PM

## 2019-09-05 ENCOUNTER — TELEPHONE (OUTPATIENT)
Dept: FAMILY MEDICINE CLINIC | Facility: CLINIC | Age: 24
End: 2019-09-05

## 2019-09-05 NOTE — TELEPHONE ENCOUNTER
Nirmala had labs with Dr Nix last Friday and she has not heard back on the results.  Can you check on this and give her a call?

## 2019-09-13 ENCOUNTER — OFFICE VISIT (OUTPATIENT)
Dept: FAMILY MEDICINE CLINIC | Facility: CLINIC | Age: 24
End: 2019-09-13

## 2019-09-13 VITALS
WEIGHT: 153 LBS | HEIGHT: 69 IN | SYSTOLIC BLOOD PRESSURE: 110 MMHG | TEMPERATURE: 99.2 F | BODY MASS INDEX: 22.66 KG/M2 | OXYGEN SATURATION: 98 % | DIASTOLIC BLOOD PRESSURE: 70 MMHG

## 2019-09-13 DIAGNOSIS — R50.9 FEVER, UNSPECIFIED FEVER CAUSE: ICD-10-CM

## 2019-09-13 DIAGNOSIS — R05.9 COUGH: ICD-10-CM

## 2019-09-13 DIAGNOSIS — J06.9 ACUTE UPPER RESPIRATORY INFECTION: Primary | ICD-10-CM

## 2019-09-13 PROBLEM — J21.9 ACUTE BRONCHIOLITIS: Status: ACTIVE | Noted: 2019-09-13

## 2019-09-13 PROCEDURE — 99213 OFFICE O/P EST LOW 20 MIN: CPT | Performed by: NURSE PRACTITIONER

## 2019-09-13 PROCEDURE — 87804 INFLUENZA ASSAY W/OPTIC: CPT | Performed by: NURSE PRACTITIONER

## 2019-09-13 RX ORDER — AMOXICILLIN 500 MG/1
500 TABLET, FILM COATED ORAL 3 TIMES DAILY
Qty: 30 TABLET | Refills: 1 | Status: SHIPPED | OUTPATIENT
Start: 2019-09-13 | End: 2019-10-03

## 2019-09-13 RX ORDER — METHYLPREDNISOLONE 4 MG/1
TABLET ORAL
Qty: 21 EACH | Refills: 0 | Status: SHIPPED | OUTPATIENT
Start: 2019-09-13 | End: 2019-10-03

## 2019-09-13 NOTE — PROGRESS NOTES
"  Chief Complaint   Patient presents with   • Cough     for over a week now      Subjective   Nirmala Tamayo is a 24 y.o. female.     Cough   This is a new problem. The current episode started in the past 7 days. The problem has been gradually worsening. The problem occurs every few minutes. The cough is productive of sputum. Associated symptoms include ear congestion, hemoptysis, myalgias, nasal congestion, postnasal drip, rhinorrhea, a sore throat and shortness of breath. Pertinent negatives include no chest pain, chills, ear pain, fever, headaches, heartburn, rash, sweats, weight loss or wheezing. The treatment provided mild relief. Her past medical history is significant for bronchitis. There is no history of asthma, bronchiectasis, COPD, emphysema, environmental allergies or pneumonia.        The following portions of the patient's history were reviewed and updated as appropriate: allergies, current medications, past social history and problem list.    Review of Systems   Constitutional: Positive for activity change. Negative for appetite change, chills, fever and weight loss.   HENT: Positive for postnasal drip, rhinorrhea and sore throat. Negative for ear pain.    Eyes: Negative.  Negative for visual disturbance.   Respiratory: Positive for cough, hemoptysis and shortness of breath. Negative for apnea, choking, chest tightness, wheezing and stridor.    Cardiovascular: Negative for chest pain.   Gastrointestinal: Negative for heartburn.   Endocrine: Negative.    Musculoskeletal: Positive for myalgias.   Skin: Negative for rash.   Allergic/Immunologic: Negative.  Negative for environmental allergies.   Neurological: Negative for headaches.   Hematological: Negative.    Psychiatric/Behavioral: Negative.        Objective   /70   Temp 99.2 °F (37.3 °C)   Ht 175.3 cm (69\")   Wt 69.4 kg (153 lb)   SpO2 98%   BMI 22.59 kg/m²   Physical Exam   Constitutional: She is oriented to person, place, and " time. She appears well-developed.   HENT:   Head: Normocephalic.   Right Ear: External ear normal.   Nose: Mucosal edema and rhinorrhea present.   Mouth/Throat: Oropharyngeal exudate present.   Eyes: Pupils are equal, round, and reactive to light.   Neck: Normal range of motion. No thyromegaly present.   Cardiovascular: Normal rate, regular rhythm, normal heart sounds and intact distal pulses. Exam reveals no gallop and no friction rub.   No murmur heard.  Pulmonary/Chest: Effort normal. No stridor. No respiratory distress. She has no wheezes. She has no rales. She exhibits no tenderness.   Abdominal: Soft. She exhibits no distension and no mass. There is no tenderness.   Musculoskeletal: Normal range of motion. She exhibits no edema, tenderness or deformity.   Neurological: She is alert and oriented to person, place, and time. She has normal reflexes. She displays normal reflexes. No cranial nerve deficit or sensory deficit. She exhibits normal muscle tone. Coordination normal.   Skin: Skin is warm and dry. No rash noted. No erythema. No pallor.   Psychiatric: She has a normal mood and affect.   Nursing note and vitals reviewed.      Assessment/Plan   Problem List Items Addressed This Visit        Respiratory    Acute bronchiolitis - Primary    Cough    Relevant Orders    XR Chest PA & Lateral           New Medications Ordered This Visit   Medications   • methylPREDNISolone (MEDROL, EMILIANA,) 4 MG tablet     Sig: Take as directed on package instructions.     Dispense:  21 each     Refill:  0   • amoxicillin (AMOXIL) 500 MG tablet     Sig: Take 1 tablet by mouth 3 (Three) Times a Day.     Dispense:  30 tablet     Refill:  1        meds as directed -diet discussed, meds as directed

## 2019-10-08 ENCOUNTER — TELEPHONE (OUTPATIENT)
Dept: FAMILY MEDICINE CLINIC | Facility: CLINIC | Age: 24
End: 2019-10-08

## 2019-10-08 ENCOUNTER — FLU SHOT (OUTPATIENT)
Dept: FAMILY MEDICINE CLINIC | Facility: CLINIC | Age: 24
End: 2019-10-08

## 2019-10-08 DIAGNOSIS — Z23 FLU VACCINE NEED: ICD-10-CM

## 2019-10-08 PROCEDURE — 90471 IMMUNIZATION ADMIN: CPT | Performed by: FAMILY MEDICINE

## 2019-10-08 PROCEDURE — 90674 CCIIV4 VAC NO PRSV 0.5 ML IM: CPT | Performed by: FAMILY MEDICINE

## 2019-10-08 NOTE — TELEPHONE ENCOUNTER
Nirmala called and said that you read her TB skin test and she turned it in to her new job but they need something saying that it was negative. Call her at [phone].

## 2019-11-06 ENCOUNTER — TELEPHONE (OUTPATIENT)
Dept: FAMILY MEDICINE CLINIC | Facility: CLINIC | Age: 24
End: 2019-11-06

## 2019-11-06 NOTE — TELEPHONE ENCOUNTER
Pt called and is on [employer] and it isn't helping. Can you up the dosage. Her phone is [phone].

## 2019-12-16 ENCOUNTER — TELEPHONE (OUTPATIENT)
Dept: FAMILY MEDICINE CLINIC | Facility: CLINIC | Age: 24
End: 2019-12-16

## 2020-01-14 ENCOUNTER — HOSPITAL ENCOUNTER (EMERGENCY)
Facility: HOSPITAL | Age: 25
Discharge: HOME | End: 2020-01-14
Attending: FAMILY MEDICINE | Admitting: EMERGENCY MEDICINE

## 2020-01-14 VITALS
DIASTOLIC BLOOD PRESSURE: 69 MMHG | WEIGHT: 150.7 LBS | HEIGHT: 69 IN | BODY MASS INDEX: 22.32 KG/M2 | OXYGEN SATURATION: 100 % | RESPIRATION RATE: 18 BRPM | SYSTOLIC BLOOD PRESSURE: 110 MMHG | HEART RATE: 78 BPM | TEMPERATURE: 97.6 F

## 2020-01-14 DIAGNOSIS — R42 DIZZINESS: Primary | ICD-10-CM

## 2020-01-14 DIAGNOSIS — Z3A.01 LESS THAN 8 WEEKS GESTATION OF PREGNANCY: ICD-10-CM

## 2020-01-14 LAB
ALBUMIN SERPL-MCNC: 4.7 G/DL (ref 3.5–5.2)
ALBUMIN/GLOB SERPL: 1.6 G/DL
ALP SERPL-CCNC: 60 U/L (ref 39–117)
ALT SERPL W P-5'-P-CCNC: 10 U/L (ref 1–33)
ANION GAP SERPL CALCULATED.3IONS-SCNC: 14 MMOL/L (ref 5–15)
AST SERPL-CCNC: 15 U/L (ref 1–32)
BASOPHILS # BLD AUTO: 0.03 10*3/MM3 (ref 0–0.2)
BASOPHILS NFR BLD AUTO: 0.5 % (ref 0–1.5)
BILIRUB SERPL-MCNC: <0.2 MG/DL (ref 0.2–1.2)
BILIRUB UR QL STRIP: NEGATIVE
BUN BLD-MCNC: 10 MG/DL (ref 6–20)
BUN/CREAT SERPL: 16.7 (ref 7–25)
CALCIUM SPEC-SCNC: 9.5 MG/DL (ref 8.6–10.5)
CHLORIDE SERPL-SCNC: 101 MMOL/L (ref 98–107)
CLARITY UR: CLEAR
CO2 SERPL-SCNC: 24 MMOL/L (ref 22–29)
COLOR UR: YELLOW
CREAT BLD-MCNC: 0.6 MG/DL (ref 0.57–1)
DEPRECATED RDW RBC AUTO: 40.8 FL (ref 37–54)
EOSINOPHIL # BLD AUTO: 0.06 10*3/MM3 (ref 0–0.4)
EOSINOPHIL NFR BLD AUTO: 0.9 % (ref 0.3–6.2)
ERYTHROCYTE [DISTWIDTH] IN BLOOD BY AUTOMATED COUNT: 14 % (ref 12.3–15.4)
GFR SERPL CREATININE-BSD FRML MDRD: 123 ML/MIN/1.73
GLOBULIN UR ELPH-MCNC: 3 GM/DL
GLUCOSE BLD-MCNC: 86 MG/DL (ref 65–99)
GLUCOSE UR STRIP-MCNC: NEGATIVE MG/DL
HCG INTACT+B SERPL-ACNC: 447.3 MIU/ML
HCT VFR BLD AUTO: 37.8 % (ref 34–46.6)
HGB BLD-MCNC: 12.4 G/DL (ref 12–15.9)
HGB UR QL STRIP.AUTO: NEGATIVE
HOLD SPECIMEN: NORMAL
IMM GRANULOCYTES # BLD AUTO: 0.02 10*3/MM3 (ref 0–0.05)
IMM GRANULOCYTES NFR BLD AUTO: 0.3 % (ref 0–0.5)
KETONES UR QL STRIP: ABNORMAL
LEUKOCYTE ESTERASE UR QL STRIP.AUTO: NEGATIVE
LYMPHOCYTES # BLD AUTO: 1.58 10*3/MM3 (ref 0.7–3.1)
LYMPHOCYTES NFR BLD AUTO: 24.6 % (ref 19.6–45.3)
MCH RBC QN AUTO: 26.7 PG (ref 26.6–33)
MCHC RBC AUTO-ENTMCNC: 32.8 G/DL (ref 31.5–35.7)
MCV RBC AUTO: 81.3 FL (ref 79–97)
MONOCYTES # BLD AUTO: 0.44 10*3/MM3 (ref 0.1–0.9)
MONOCYTES NFR BLD AUTO: 6.9 % (ref 5–12)
NEUTROPHILS # BLD AUTO: 4.28 10*3/MM3 (ref 1.7–7)
NEUTROPHILS NFR BLD AUTO: 66.8 % (ref 42.7–76)
NITRITE UR QL STRIP: NEGATIVE
NRBC BLD AUTO-RTO: 0 /100 WBC (ref 0–0.2)
PH UR STRIP.AUTO: 6 [PH] (ref 5–9)
PLATELET # BLD AUTO: 198 10*3/MM3 (ref 140–450)
PMV BLD AUTO: 10 FL (ref 6–12)
POTASSIUM BLD-SCNC: 3.7 MMOL/L (ref 3.5–5.2)
PROT SERPL-MCNC: 7.7 G/DL (ref 6–8.5)
PROT UR QL STRIP: NEGATIVE
RBC # BLD AUTO: 4.65 10*6/MM3 (ref 3.77–5.28)
SODIUM BLD-SCNC: 139 MMOL/L (ref 136–145)
SP GR UR STRIP: 1.01 (ref 1–1.03)
UROBILINOGEN UR QL STRIP: ABNORMAL
WBC NRBC COR # BLD: 6.41 10*3/MM3 (ref 3.4–10.8)
WHOLE BLOOD HOLD SPECIMEN: NORMAL

## 2020-01-14 PROCEDURE — 87086 URINE CULTURE/COLONY COUNT: CPT | Performed by: PHYSICIAN ASSISTANT

## 2020-01-14 PROCEDURE — 85025 COMPLETE CBC W/AUTO DIFF WBC: CPT | Performed by: PHYSICIAN ASSISTANT

## 2020-01-14 PROCEDURE — 84702 CHORIONIC GONADOTROPIN TEST: CPT | Performed by: PHYSICIAN ASSISTANT

## 2020-01-14 PROCEDURE — 81003 URINALYSIS AUTO W/O SCOPE: CPT | Performed by: PHYSICIAN ASSISTANT

## 2020-01-14 PROCEDURE — 87591 N.GONORRHOEAE DNA AMP PROB: CPT | Performed by: PHYSICIAN ASSISTANT

## 2020-01-14 PROCEDURE — 99284 EMERGENCY DEPT VISIT MOD MDM: CPT

## 2020-01-14 PROCEDURE — 87491 CHLMYD TRACH DNA AMP PROBE: CPT | Performed by: PHYSICIAN ASSISTANT

## 2020-01-14 PROCEDURE — 87661 TRICHOMONAS VAGINALIS AMPLIF: CPT | Performed by: PHYSICIAN ASSISTANT

## 2020-01-14 PROCEDURE — 80053 COMPREHEN METABOLIC PANEL: CPT | Performed by: PHYSICIAN ASSISTANT

## 2020-01-14 RX ORDER — DOCUSATE SODIUM 100 MG/1
100 CAPSULE, LIQUID FILLED ORAL DAILY
COMMUNITY
End: 2020-01-30

## 2020-01-14 RX ORDER — SODIUM CHLORIDE 0.9 % (FLUSH) 0.9 %
10 SYRINGE (ML) INJECTION AS NEEDED
Status: DISCONTINUED | OUTPATIENT
Start: 2020-01-14 | End: 2020-01-14 | Stop reason: HOSPADM

## 2020-01-14 RX ADMIN — SODIUM CHLORIDE 1000 ML: 900 INJECTION, SOLUTION INTRAVENOUS at 18:45

## 2020-01-15 LAB
BACTERIA SPEC AEROBE CULT: NO GROWTH
C TRACH RRNA CVX QL NAA+PROBE: NEGATIVE
N GONORRHOEA RRNA SPEC QL NAA+PROBE: NEGATIVE
TRICHOMONAS VAGINALIS PCR: NEGATIVE

## 2020-01-15 NOTE — ED PROVIDER NOTES
Subjective   Patient presents to emergency department for dizziness today.  States she stood up from a seated position and her vision became blurry and dark and almost passed out.  States she just found out recently she was pregnant.  Last menses approximately 1 month ago.  Endorses mild pelvic cramping but denies any vaginal bleeding or discharge, flank pain, fever, chills, nausea, vomiting.      History provided by:  Patient   used: No    Dizziness   Quality:  Lightheadedness  Severity:  Severe  Onset quality:  Sudden  Duration: 10 seconds.  Progression:  Resolved  Chronicity:  New  Context: standing up    Associated symptoms: no chest pain, no nausea, no shortness of breath, no vomiting and no weakness        Review of Systems   Constitutional: Negative for chills and fever.   HENT: Negative for sore throat and trouble swallowing.    Eyes: Negative for visual disturbance.   Respiratory: Negative for shortness of breath and wheezing.    Cardiovascular: Negative for chest pain.   Gastrointestinal: Negative for abdominal pain, nausea and vomiting.   Genitourinary: Negative for dysuria and flank pain.   Musculoskeletal: Negative for back pain.   Skin: Negative for color change.   Allergic/Immunologic: Negative for immunocompromised state.   Neurological: Positive for dizziness. Negative for syncope and weakness.   Hematological: Does not bruise/bleed easily.   Psychiatric/Behavioral: Negative for confusion.       Past Medical History:   Diagnosis Date   • Acute allergic reaction    • Acute bronchitis    • Acute pharyngitis    • Agoraphobia with panic attacks    • Allergic rhinitis    • Anemia    • Anxiety    • Anxiety state    • Asthma     Stable   • Back strain    • Constipation    • Cough    • Disturbance of skin sensation    • Gestational hypertension     with second pregnancy   • Headache    • History of transfusion     after second delivery   • HPV (human papilloma virus) infection    • Hx of  "preeclampsia, prior pregnancy, currently pregnant 1/2/2018   • Irregular periods    • Irritable bowel syndrome with constipation    • Low back pain    • Lumbosacral dysfunction    • Neck pain    • Palpitations    • Rh negative state in antepartum period, third trimester 12/7/2017   • Rhinitis    • Severe depression (CMS/HCC)    • Spasm     cervical spasm   • Upper respiratory infection    • Urinary tract infection 7/14/2017   • Vaginal irritation    • Vitreous floaters     prob, not seen on exam   • Wheezing        No Known Allergies    Past Surgical History:   Procedure Laterality Date   • PROCEDURE GENERIC CONVERTED  02/01/2016    Physical Therapy Consult   • WISDOM TOOTH EXTRACTION         Family History   Problem Relation Age of Onset   • Heart attack Mother         Stents & heart attack   • COPD Sister    • Cancer Other    • Diabetes Other    • Hypertension Other    • Stroke Other    • Thyroid disease Other    • Gallbladder disease Other         Gallstones       Social History     Socioeconomic History   • Marital status:      Spouse name: Not on file   • Number of children: Not on file   • Years of education: Not on file   • Highest education level: Not on file   Tobacco Use   • Smoking status: Never Smoker   • Smokeless tobacco: Never Used   Substance and Sexual Activity   • Alcohol use: No   • Drug use: No   • Sexual activity: Yes     Partners: Male     Birth control/protection: None           Objective      /69 (BP Location: Left arm, Patient Position: Lying)   Pulse 78   Temp 97.6 °F (36.4 °C) (Tympanic)   Resp 18   Ht 175.3 cm (69\")   Wt 68.4 kg (150 lb 11.2 oz)   LMP 12/14/2019   SpO2 100%   BMI 22.25 kg/m²     Physical Exam   Constitutional: She is oriented to person, place, and time. She appears well-developed and well-nourished.   HENT:   Head: Normocephalic and atraumatic.   Eyes: Conjunctivae are normal.   Cardiovascular: Normal rate, regular rhythm, normal heart sounds and " intact distal pulses.   Pulmonary/Chest: Effort normal and breath sounds normal. No respiratory distress. She has no wheezes.   Abdominal: Soft. Bowel sounds are normal. She exhibits no distension and no mass. There is no tenderness. There is no guarding.   Musculoskeletal: She exhibits no edema.   Neurological: She is alert and oriented to person, place, and time.   Skin: Skin is warm and dry. Capillary refill takes less than 2 seconds.   Psychiatric: She has a normal mood and affect. Her behavior is normal. Thought content normal.   Nursing note and vitals reviewed.      Procedures           ED Course      Results for orders placed or performed during the hospital encounter of 01/14/20   Comprehensive Metabolic Panel   Result Value Ref Range    Glucose 86 65 - 99 mg/dL    BUN 10 6 - 20 mg/dL    Creatinine 0.60 0.57 - 1.00 mg/dL    Sodium 139 136 - 145 mmol/L    Potassium 3.7 3.5 - 5.2 mmol/L    Chloride 101 98 - 107 mmol/L    CO2 24.0 22.0 - 29.0 mmol/L    Calcium 9.5 8.6 - 10.5 mg/dL    Total Protein 7.7 6.0 - 8.5 g/dL    Albumin 4.70 3.50 - 5.20 g/dL    ALT (SGPT) 10 1 - 33 U/L    AST (SGOT) 15 1 - 32 U/L    Alkaline Phosphatase 60 39 - 117 U/L    Total Bilirubin <0.2 (L) 0.2 - 1.2 mg/dL    eGFR Non African Amer 123 >60 mL/min/1.73    Globulin 3.0 gm/dL    A/G Ratio 1.6 g/dL    BUN/Creatinine Ratio 16.7 7.0 - 25.0    Anion Gap 14.0 5.0 - 15.0 mmol/L   CBC Auto Differential   Result Value Ref Range    WBC 6.41 3.40 - 10.80 10*3/mm3    RBC 4.65 3.77 - 5.28 10*6/mm3    Hemoglobin 12.4 12.0 - 15.9 g/dL    Hematocrit 37.8 34.0 - 46.6 %    MCV 81.3 79.0 - 97.0 fL    MCH 26.7 26.6 - 33.0 pg    MCHC 32.8 31.5 - 35.7 g/dL    RDW 14.0 12.3 - 15.4 %    RDW-SD 40.8 37.0 - 54.0 fl    MPV 10.0 6.0 - 12.0 fL    Platelets 198 140 - 450 10*3/mm3    Neutrophil % 66.8 42.7 - 76.0 %    Lymphocyte % 24.6 19.6 - 45.3 %    Monocyte % 6.9 5.0 - 12.0 %    Eosinophil % 0.9 0.3 - 6.2 %    Basophil % 0.5 0.0 - 1.5 %    Immature Grans % 0.3  0.0 - 0.5 %    Neutrophils, Absolute 4.28 1.70 - 7.00 10*3/mm3    Lymphocytes, Absolute 1.58 0.70 - 3.10 10*3/mm3    Monocytes, Absolute 0.44 0.10 - 0.90 10*3/mm3    Eosinophils, Absolute 0.06 0.00 - 0.40 10*3/mm3    Basophils, Absolute 0.03 0.00 - 0.20 10*3/mm3    Immature Grans, Absolute 0.02 0.00 - 0.05 10*3/mm3    nRBC 0.0 0.0 - 0.2 /100 WBC   hCG, Quantitative, Pregnancy   Result Value Ref Range    HCG Quantitative 447.30 mIU/mL   Urinalysis With Culture If Indicated - Urine, Clean Catch   Result Value Ref Range    Color, UA Yellow Yellow, Straw, Dark Yellow, Roopa    Appearance, UA Clear Clear    pH, UA 6.0 5.0 - 9.0    Specific Datto, UA 1.015 1.003 - 1.030    Glucose, UA Negative Negative    Ketones, UA 40 mg/dL (2+) (A) Negative    Bilirubin, UA Negative Negative    Blood, UA Negative Negative    Protein, UA Negative Negative    Leuk Esterase, UA Negative Negative    Nitrite, UA Negative Negative    Urobilinogen, UA 0.2 E.U./dL 0.2 - 1.0 E.U./dL   Light Blue Top   Result Value Ref Range    Extra Tube hold for add-on    Gold Top - SST   Result Value Ref Range    Extra Tube Hold for add-ons.           Discussed results with patient.  Gave educational materials.  Advised close follow up with PCP/OB/GYN.  Return to emergency department for new or worsening symptoms.                                        MDM    Final diagnoses:   Dizziness   Less than 8 weeks gestation of pregnancy            Sánchez mSith PA-C  01/14/20 1949

## 2020-01-15 NOTE — ED NOTES
Pt ambulatory to BR without difficulty to attempt voided urine specimen.     Whit Francois, RN  01/14/20 [id number]

## 2020-01-21 DIAGNOSIS — Z32.01 POSITIVE PREGNANCY TEST: Primary | ICD-10-CM

## 2020-01-23 ENCOUNTER — APPOINTMENT (OUTPATIENT)
Dept: LAB | Facility: HOSPITAL | Age: 25
End: 2020-01-23

## 2020-01-23 LAB — HCG INTACT+B SERPL-ACNC: 7186 MIU/ML

## 2020-01-23 PROCEDURE — 84702 CHORIONIC GONADOTROPIN TEST: CPT | Performed by: NURSE PRACTITIONER

## 2020-01-23 PROCEDURE — 36415 COLL VENOUS BLD VENIPUNCTURE: CPT | Performed by: NURSE PRACTITIONER

## 2020-01-24 ENCOUNTER — LAB (OUTPATIENT)
Dept: LAB | Facility: HOSPITAL | Age: 25
End: 2020-01-24

## 2020-01-24 ENCOUNTER — DOCUMENTATION (OUTPATIENT)
Dept: OBSTETRICS AND GYNECOLOGY | Facility: CLINIC | Age: 25
End: 2020-01-24

## 2020-01-24 DIAGNOSIS — Z32.00 POSSIBLE PREGNANCY, NOT CONFIRMED: ICD-10-CM

## 2020-01-24 DIAGNOSIS — Z32.00 POSSIBLE PREGNANCY, NOT CONFIRMED: Primary | ICD-10-CM

## 2020-01-24 LAB — HCG INTACT+B SERPL-ACNC: 9672 MIU/ML

## 2020-01-24 PROCEDURE — 36415 COLL VENOUS BLD VENIPUNCTURE: CPT

## 2020-01-24 PROCEDURE — 84702 CHORIONIC GONADOTROPIN TEST: CPT

## 2020-01-24 RX ORDER — FLUCONAZOLE 150 MG/1
150 TABLET ORAL DAILY
Qty: 1 TABLET | Refills: 1 | OUTPATIENT
Start: 2020-01-24 | End: 2020-01-30

## 2020-01-24 RX ORDER — METRONIDAZOLE 7.5 MG/G
1 GEL VAGINAL 2 TIMES DAILY
Qty: 1 TUBE | Refills: 0 | Status: SHIPPED | OUTPATIENT
Start: 2020-01-24 | End: 2020-01-29

## 2020-01-27 DIAGNOSIS — O20.0 THREATENED ABORTION: Primary | ICD-10-CM

## 2020-01-28 ENCOUNTER — OFFICE VISIT (OUTPATIENT)
Dept: OBSTETRICS AND GYNECOLOGY | Facility: CLINIC | Age: 25
End: 2020-01-28

## 2020-01-28 ENCOUNTER — LAB (OUTPATIENT)
Dept: LAB | Facility: HOSPITAL | Age: 25
End: 2020-01-28

## 2020-01-28 VITALS
HEIGHT: 69 IN | SYSTOLIC BLOOD PRESSURE: 116 MMHG | BODY MASS INDEX: 22.81 KG/M2 | WEIGHT: 154 LBS | DIASTOLIC BLOOD PRESSURE: 72 MMHG

## 2020-01-28 DIAGNOSIS — O20.9 VAGINAL BLEEDING AFFECTING EARLY PREGNANCY: Primary | ICD-10-CM

## 2020-01-28 DIAGNOSIS — O20.0 THREATENED ABORTION: ICD-10-CM

## 2020-01-28 LAB — HCG INTACT+B SERPL-ACNC: NORMAL MIU/ML

## 2020-01-28 PROCEDURE — 99213 OFFICE O/P EST LOW 20 MIN: CPT | Performed by: OBSTETRICS & GYNECOLOGY

## 2020-01-28 PROCEDURE — 36415 COLL VENOUS BLD VENIPUNCTURE: CPT

## 2020-01-28 PROCEDURE — 84702 CHORIONIC GONADOTROPIN TEST: CPT

## 2020-01-28 NOTE — PROGRESS NOTES
[facility]  Gynecology    CC: Vaginal bleeding    HPI  Nirmala Tamayo is a 24 y.o.  at 6w0d by LMP (2019) presents for acute visit secondary to vaginal bleeding.      She was seen on  at [facility] ED for vaginal bleeding.  At that time, she had a TVUS that showed an intrauterine gestational sac but no fetal pole.  She did receive RhoGAM due to Rh negative status.  She has an appointment and US scheduled for tomorrow with Dr. Lock.  She did have a beta-hCG drawn today that was 28k (on  was 12k, on  was 3k).    She states that she is having light pink discharge like she did on Saturday and was very concerned.    Denies any vaginal itching, burning, irritation, or discharge.  Denies any sexual dysfunction concerns.  Denies any urinary symptoms including incontinence, dysuria, frequency, urgency, nocturia.    ROS  Review of Systems   Constitutional: Negative.    HENT: Negative.    Eyes: Negative.    Respiratory: Negative.    Cardiovascular: Negative.    Gastrointestinal: Negative.    Endocrine: Negative.    Genitourinary: Positive for vaginal bleeding.   Musculoskeletal: Negative.    Skin: Negative.    Allergic/Immunologic: Negative.    Neurological: Negative.    Hematological: Negative.    Psychiatric/Behavioral: Negative.       OB HISTORY  OB History    Para Term  AB Living   4 2 1 1   2   SAB TAB Ectopic Molar Multiple Live Births             2      # Outcome Date GA Lbr Peter/2nd Weight Sex Delivery Anes PTL Lv   4 Current            3  2014 36w0d  2835 g (6 lb 4 oz) F Vag-Spont EPI  IRMA   2 Term 2013 39w0d  3487 g (7 lb 11 oz) M Vag-Spont EPI  IRMA   1               PAST MEDICAL HISTORY  Past Medical History:   Diagnosis Date   • Acute allergic reaction    • Acute bronchitis    • Acute pharyngitis    • Agoraphobia with panic attacks    • Allergic rhinitis    • Anemia    • Anxiety    • Anxiety state    • Asthma     Stable   • Back  strain    • Constipation    • Cough    • Disturbance of skin sensation    • Gestational hypertension     with second pregnancy   • Headache    • History of transfusion     after second delivery   • HPV (human papilloma virus) infection    • Hx of preeclampsia, prior pregnancy, currently pregnant 1/2/2018   • Irregular periods    • Irritable bowel syndrome with constipation    • Low back pain    • Lumbosacral dysfunction    • Neck pain    • Palpitations    • Rh negative state in antepartum period, third trimester 12/7/2017   • Rhinitis    • Severe depression (CMS/HCC)    • Spasm     cervical spasm   • Upper respiratory infection    • Urinary tract infection 7/14/2017   • Vaginal irritation    • Vitreous floaters     prob, not seen on exam   • Wheezing      PAST SURGICAL HISTORY  Past Surgical History:   Procedure Laterality Date   • PROCEDURE GENERIC CONVERTED  02/01/2016    Physical Therapy Consult   • WISDOM TOOTH EXTRACTION       FAMILY HISTORY  Family History   Problem Relation Age of Onset   • Heart attack Mother         Stents & heart attack   • COPD Sister    • Cancer Other    • Diabetes Other    • Hypertension Other    • Stroke Other    • Thyroid disease Other    • Gallbladder disease Other         Gallstones     SOCIAL HISTORY  Social History     Socioeconomic History   • Marital status:      Spouse name: Not on file   • Number of children: Not on file   • Years of education: Not on file   • Highest education level: Not on file   Tobacco Use   • Smoking status: Never Smoker   • Smokeless tobacco: Never Used   Substance and Sexual Activity   • Alcohol use: No   • Drug use: No   • Sexual activity: Yes     Partners: Male     Birth control/protection: None     ALLERGIES  Allergies   Allergen Reactions   • Banana Angioedema and Hives     Itchy throat   • Latex Hives and Angioedema       HOME MEDICATIONS  Prior to Admission medications    Medication Sig Start Date End Date Taking? Authorizing Provider  "  albuterol (PROVENTIL HFA;VENTOLIN HFA) 108 (90 Base) MCG/ACT inhaler Inhale 2 puffs Every 4 (Four) Hours As Needed for Wheezing or Shortness of Air. 18   Milagros Aranda APRN   clonazePAM (KLONOPIN) 0.5 MG tablet Take 1 tablet by mouth 3 (Three) Times a Day As Needed for Anxiety. 19   Linnea Verduzco APRN   docusate sodium (COLACE) 100 MG capsule Take 100 mg by mouth Daily.    Provider, MD Rosa   EPINEPHrine (EPIPEN 2-EMILIANA) 0.3 MG/0.3ML solution auto-injector injection as needed. 10/21/15   Emergency, Nurse Ursula, RN   fluconazole (DIFLUCAN) 150 MG tablet Take 1 tablet by mouth Daily. 20   Jinny Boyd APRN   linaclotide (LINZESS) 290 MCG capsule capsule Take 1 capsule by mouth Every Morning Before Breakfast.  Patient taking differently: Take 290 mcg by mouth Every Morning Before Breakfast. Has not been taking since she found out she was pregnant 19   Linnea Verduzco APRN   loratadine (CLARITIN) 10 MG tablet Take 1 tablet by mouth Daily. 19   Wilmer Cash MD   metroNIDAZOLE (METROGEL VAGINAL) 0.75 % vaginal gel Insert 1 application into the vagina 2 (Two) Times a Day for 5 days. 20  Jinny Boyd APRN     PE  /72   Ht 175.3 cm (69\")   Wt 69.9 kg (154 lb)   LMP 2019 (Approximate)   Breastfeeding No   BMI 22.74 kg/m²        General: Alert, healthy, no distress, well nourished and well developed.  Lungs: Normal respiratory effort.  Clear to auscultation bilaterally.  No wheezes, rhonci, or rales.  Heart: Regular rate and rhythm.  No murmer, rub or gallop.  Abdomen: Soft, non-tender, non-distended,no masses, no hepatosplenomegaly, no hernia.  Skin: No rash, no lesions.  Extremities: No cyanosis, clubbing or edema.    BSUS: GS visualized.  YS visualized.  Small intrauterine pregnancy w/ +HBT.    IMPRESSION  Nirmalajeremy Marquez Roni is a 24 y.o.  at 6w0d with light spotting, viable IUP.    PLAN    1. Vaginal " bleeding affecting early pregnancy  Discussed likely implantation bleeding or related to her BV.  - Recommend keeping appt for US and visit with Dr. Lock scheduled for tomorrow    This document has been electronically signed by Jessenia Lockhart MD on January 28, 2020 2:34 PM.

## 2020-01-29 ENCOUNTER — INITIAL PRENATAL (OUTPATIENT)
Dept: OBSTETRICS AND GYNECOLOGY | Facility: CLINIC | Age: 25
End: 2020-01-29

## 2020-01-29 ENCOUNTER — APPOINTMENT (OUTPATIENT)
Dept: LAB | Facility: HOSPITAL | Age: 25
End: 2020-01-29

## 2020-01-29 VITALS — BODY MASS INDEX: 21.86 KG/M2 | WEIGHT: 148 LBS

## 2020-01-29 VITALS — DIASTOLIC BLOOD PRESSURE: 60 MMHG | SYSTOLIC BLOOD PRESSURE: 120 MMHG

## 2020-01-29 DIAGNOSIS — Z34.80 SUPERVISION OF OTHER NORMAL PREGNANCY: Primary | ICD-10-CM

## 2020-01-29 DIAGNOSIS — O09.291 HX OF PREECLAMPSIA, PRIOR PREGNANCY, CURRENTLY PREGNANT, FIRST TRIMESTER: ICD-10-CM

## 2020-01-29 DIAGNOSIS — Z87.59 HISTORY OF GESTATIONAL HYPERTENSION: ICD-10-CM

## 2020-01-29 DIAGNOSIS — O20.0 THREATENED ABORTION: Primary | ICD-10-CM

## 2020-01-29 LAB
ABO GROUP BLD: NORMAL
AMPHET+METHAMPHET UR QL: NEGATIVE
AMPHETAMINES UR QL: NEGATIVE
ANTI-D, PASSIVE: NORMAL
BARBITURATES UR QL SCN: NEGATIVE
BENZODIAZ UR QL SCN: NEGATIVE
BLD GP AB SCN SERPL QL: POSITIVE
BUPRENORPHINE SERPL-MCNC: NEGATIVE NG/ML
CANNABINOIDS SERPL QL: NEGATIVE
COCAINE UR QL: NEGATIVE
Lab: NORMAL
METHADONE UR QL SCN: NEGATIVE
OPIATES UR QL: NEGATIVE
OXYCODONE UR QL SCN: NEGATIVE
PCP UR QL SCN: NEGATIVE
PROPOXYPH UR QL: NEGATIVE
RH BLD: NEGATIVE
TRICYCLICS UR QL SCN: NEGATIVE

## 2020-01-29 PROCEDURE — 86901 BLOOD TYPING SEROLOGIC RH(D): CPT | Performed by: OBSTETRICS & GYNECOLOGY

## 2020-01-29 PROCEDURE — 80081 OBSTETRIC PANEL INC HIV TSTG: CPT | Performed by: OBSTETRICS & GYNECOLOGY

## 2020-01-29 PROCEDURE — 80306 DRUG TEST PRSMV INSTRMNT: CPT | Performed by: OBSTETRICS & GYNECOLOGY

## 2020-01-29 PROCEDURE — 86900 BLOOD TYPING SEROLOGIC ABO: CPT | Performed by: OBSTETRICS & GYNECOLOGY

## 2020-01-29 PROCEDURE — 36415 COLL VENOUS BLD VENIPUNCTURE: CPT

## 2020-01-29 PROCEDURE — 81003 URINALYSIS AUTO W/O SCOPE: CPT | Performed by: OBSTETRICS & GYNECOLOGY

## 2020-01-29 PROCEDURE — 0502F SUBSEQUENT PRENATAL CARE: CPT | Performed by: OBSTETRICS & GYNECOLOGY

## 2020-01-29 PROCEDURE — 86803 HEPATITIS C AB TEST: CPT | Performed by: OBSTETRICS & GYNECOLOGY

## 2020-01-29 PROCEDURE — 87086 URINE CULTURE/COLONY COUNT: CPT | Performed by: OBSTETRICS & GYNECOLOGY

## 2020-01-29 PROCEDURE — 86870 RBC ANTIBODY IDENTIFICATION: CPT | Performed by: OBSTETRICS & GYNECOLOGY

## 2020-01-29 PROCEDURE — 80053 COMPREHEN METABOLIC PANEL: CPT | Performed by: OBSTETRICS & GYNECOLOGY

## 2020-01-29 PROCEDURE — 86850 RBC ANTIBODY SCREEN: CPT | Performed by: OBSTETRICS & GYNECOLOGY

## 2020-01-29 RX ORDER — METOCLOPRAMIDE 10 MG/1
5 TABLET ORAL 4 TIMES DAILY
Qty: 60 TABLET | Refills: 6 | Status: SHIPPED | OUTPATIENT
Start: 2020-01-29 | End: 2020-02-19

## 2020-01-29 RX ORDER — PRENATAL VIT/IRON FUM/FOLIC AC 27MG-0.8MG
1 TABLET ORAL DAILY
COMMUNITY
End: 2020-01-30

## 2020-01-29 NOTE — PROGRESS NOTES
I spent approximately 60 minutes with the patient acquiring the health and history intake and discussing topics related to healthy lifestyle. This is her 4th  Pregnancy. She has history of preeclampsia and gestational hypertension.  A newob bag is given. The 1st trimester teaching was done with the patient. We discussed a healthy diet and exercise and what is recommended. I also discussed Listeriosis and Toxoplasmosis and what fish to avoid due to high mercury levels. Informed patient not to be in hot tubs, saunas, or tanning beds. We discussed that spotting may occur after intercourse which is common, but if heavy bleeding like a period occurs to call the [facility] or hospital if clinic is closed.  I encouraged her to make an appointment with the dentist if she has not had a dental exam and cleaning in the last 6 months. The patient denies use of  alcohol, illicit drug use, and tobacco smoking. The patient does not smoke but discussed the importance of avoiding second hand smoke. We discussed the hospital policy procedure if a patient has a positive urine drug screen at the time of admission to the hospital. She plans to breastfeed. I gave her pamphlet on breastfeeding classes and the breastfeeding mothers support group that is provided by Flores Henley, Lactation Consultant. She filled out the health department referral form and depression screening questionnaire. I discussed with the patient that a pediatrician needs to be chosen prior to delivery for the infant to have an appointment scheduled before leaving the hospital.  I encouraged the patient to get the TDAP vaccine in the 3rd trimester.    I discussed lab tests will be done today. A 24 hr urine protein and CMP are also ordered. She has had some spotting. She is scheduled for an ultrasound today and see Dr. Lock. All questions were answered at this time.

## 2020-01-30 LAB
ALBUMIN SERPL-MCNC: 4.6 G/DL (ref 3.5–5.2)
ALBUMIN/GLOB SERPL: 2.1 G/DL
ALP SERPL-CCNC: 46 U/L (ref 39–117)
ALT SERPL W P-5'-P-CCNC: 12 U/L (ref 1–33)
ANION GAP SERPL CALCULATED.3IONS-SCNC: 15.4 MMOL/L (ref 5–15)
AST SERPL-CCNC: 14 U/L (ref 1–32)
BACTERIA SPEC AEROBE CULT: NO GROWTH
BASOPHILS # BLD AUTO: 0.05 10*3/MM3 (ref 0–0.2)
BASOPHILS NFR BLD AUTO: 0.6 % (ref 0–1.5)
BILIRUB SERPL-MCNC: <0.2 MG/DL (ref 0.2–1.2)
BILIRUB UR QL STRIP: NEGATIVE
BUN BLD-MCNC: 8 MG/DL (ref 6–20)
BUN/CREAT SERPL: 14 (ref 7–25)
CALCIUM SPEC-SCNC: 9.1 MG/DL (ref 8.6–10.5)
CHLORIDE SERPL-SCNC: 99 MMOL/L (ref 98–107)
CLARITY UR: ABNORMAL
CO2 SERPL-SCNC: 22.6 MMOL/L (ref 22–29)
COLOR UR: YELLOW
CREAT BLD-MCNC: 0.57 MG/DL (ref 0.57–1)
DEPRECATED RDW RBC AUTO: 44.1 FL (ref 37–54)
EOSINOPHIL # BLD AUTO: 0.07 10*3/MM3 (ref 0–0.4)
EOSINOPHIL NFR BLD AUTO: 0.8 % (ref 0.3–6.2)
ERYTHROCYTE [DISTWIDTH] IN BLOOD BY AUTOMATED COUNT: 14.8 % (ref 12.3–15.4)
GFR SERPL CREATININE-BSD FRML MDRD: 130 ML/MIN/1.73
GLOBULIN UR ELPH-MCNC: 2.2 GM/DL
GLUCOSE BLD-MCNC: 97 MG/DL (ref 65–99)
GLUCOSE UR STRIP-MCNC: NEGATIVE MG/DL
HBV SURFACE AG SERPL QL IA: NORMAL
HCT VFR BLD AUTO: 36.6 % (ref 34–46.6)
HCV AB SER DONR QL: NORMAL
HGB BLD-MCNC: 12.1 G/DL (ref 12–15.9)
HGB UR QL STRIP.AUTO: NEGATIVE
HIV1+2 AB SER QL: NORMAL
HOLD SPECIMEN: NORMAL
IMM GRANULOCYTES # BLD AUTO: 0.02 10*3/MM3 (ref 0–0.05)
IMM GRANULOCYTES NFR BLD AUTO: 0.2 % (ref 0–0.5)
KETONES UR QL STRIP: NEGATIVE
LEUKOCYTE ESTERASE UR QL STRIP.AUTO: NEGATIVE
LYMPHOCYTES # BLD AUTO: 1.64 10*3/MM3 (ref 0.7–3.1)
LYMPHOCYTES NFR BLD AUTO: 19 % (ref 19.6–45.3)
MCH RBC QN AUTO: 27.3 PG (ref 26.6–33)
MCHC RBC AUTO-ENTMCNC: 33.1 G/DL (ref 31.5–35.7)
MCV RBC AUTO: 82.6 FL (ref 79–97)
MONOCYTES # BLD AUTO: 0.52 10*3/MM3 (ref 0.1–0.9)
MONOCYTES NFR BLD AUTO: 6 % (ref 5–12)
NEUTROPHILS # BLD AUTO: 6.35 10*3/MM3 (ref 1.7–7)
NEUTROPHILS NFR BLD AUTO: 73.4 % (ref 42.7–76)
NITRITE UR QL STRIP: NEGATIVE
NRBC BLD AUTO-RTO: 0 /100 WBC (ref 0–0.2)
PH UR STRIP.AUTO: 5.5 [PH] (ref 5–8)
PLATELET # BLD AUTO: 198 10*3/MM3 (ref 140–450)
PMV BLD AUTO: 10.8 FL (ref 6–12)
POTASSIUM BLD-SCNC: 3.7 MMOL/L (ref 3.5–5.2)
PROT SERPL-MCNC: 6.8 G/DL (ref 6–8.5)
PROT UR QL STRIP: NEGATIVE
RBC # BLD AUTO: 4.43 10*6/MM3 (ref 3.77–5.28)
RPR SER QL: NORMAL
RUBV IGG SERPL IA-ACNC: POSITIVE
SODIUM BLD-SCNC: 137 MMOL/L (ref 136–145)
SP GR UR STRIP: 1.02 (ref 1–1.03)
UROBILINOGEN UR QL STRIP: ABNORMAL
WBC NRBC COR # BLD: 8.65 10*3/MM3 (ref 3.4–10.8)

## 2020-01-30 PROCEDURE — 87081 CULTURE SCREEN ONLY: CPT | Performed by: NURSE PRACTITIONER

## 2020-02-05 ENCOUNTER — TELEPHONE (OUTPATIENT)
Dept: OBSTETRICS AND GYNECOLOGY | Facility: CLINIC | Age: 25
End: 2020-02-05

## 2020-02-05 ENCOUNTER — ROUTINE PRENATAL (OUTPATIENT)
Dept: OBSTETRICS AND GYNECOLOGY | Facility: CLINIC | Age: 25
End: 2020-02-05

## 2020-02-05 VITALS — BODY MASS INDEX: 22.95 KG/M2 | WEIGHT: 155.4 LBS | DIASTOLIC BLOOD PRESSURE: 72 MMHG | SYSTOLIC BLOOD PRESSURE: 118 MMHG

## 2020-02-05 DIAGNOSIS — O20.0 THREATENED ABORTION: Primary | ICD-10-CM

## 2020-02-05 DIAGNOSIS — O20.0 THREATENED ABORTION: ICD-10-CM

## 2020-02-05 PROCEDURE — 0502F SUBSEQUENT PRENATAL CARE: CPT | Performed by: OBSTETRICS & GYNECOLOGY

## 2020-02-05 NOTE — TELEPHONE ENCOUNTER
PATIENT CALLED ABOUT HAVING A COUGH AND WANTED TO KNOW WHAT SHE CAN TAKE..TOLD HER COUGH DROPS AND ROBITUSSIN DM..SHE CAN ALSO GO TO URGENT CARE     BUT SHE WANTED SOMEONE CALL HER ABOUT SPOTTING, SHE SAYS ITS PINK WITH CHUNKS

## 2020-02-05 NOTE — PROGRESS NOTES
CC: Eating vaginally again    Nirmala Tamayo is a 24 y.o.  at 7w1d.  Doing well.  With recurrent vaginal bleeding in early pregnancy.  Denies contractions, LOF, or VB.  Patient had been seen for this before and ultrasound had indicated fetal heart tones    /72   Wt 70.5 kg (155 lb 6.4 oz)   LMP 2019 (Approximate)   BMI 22.95 kg/m²   SVE: Not done           Problems (from 20 to present)     No problems associated with this episode.          A/P: Nirmala Tamayo is a 24 y.o.  at 7w1d.         Diagnosis Plan   1. Threatened   US Ob Transvaginal profound today shows fetal heart activity.  Clinical situation reviewed.  I said it is still possible she could miscarry but the further that she got along the better things were     Tyler oLck MD  2020  5:40 PM

## 2020-02-05 NOTE — TELEPHONE ENCOUNTER
I CALLED THE PATIENT AND I AM HAVING HER TO COME IN TODAY TO HAVE AN ULTRASOUND AND SEE DR. HENDRICKSON BECAUSE SHE IS SPOTTING.

## 2020-02-06 ENCOUNTER — APPOINTMENT (OUTPATIENT)
Dept: LAB | Facility: HOSPITAL | Age: 25
End: 2020-02-06

## 2020-02-06 ENCOUNTER — OFFICE VISIT (OUTPATIENT)
Dept: FAMILY MEDICINE CLINIC | Facility: CLINIC | Age: 25
End: 2020-02-06

## 2020-02-06 VITALS
OXYGEN SATURATION: 95 % | BODY MASS INDEX: 22.66 KG/M2 | SYSTOLIC BLOOD PRESSURE: 110 MMHG | TEMPERATURE: 99.1 F | DIASTOLIC BLOOD PRESSURE: 64 MMHG | HEIGHT: 69 IN | WEIGHT: 153 LBS

## 2020-02-06 DIAGNOSIS — J02.9 SORE THROAT: ICD-10-CM

## 2020-02-06 DIAGNOSIS — J06.9 ACUTE UPPER RESPIRATORY INFECTION: ICD-10-CM

## 2020-02-06 DIAGNOSIS — R50.9 FEVER, UNSPECIFIED FEVER CAUSE: Primary | ICD-10-CM

## 2020-02-06 LAB
BASOPHILS # BLD AUTO: 0.03 10*3/MM3 (ref 0–0.2)
BASOPHILS NFR BLD AUTO: 0.4 % (ref 0–1.5)
DEPRECATED RDW RBC AUTO: 43.5 FL (ref 37–54)
EOSINOPHIL # BLD AUTO: 0.08 10*3/MM3 (ref 0–0.4)
EOSINOPHIL NFR BLD AUTO: 1.2 % (ref 0.3–6.2)
ERYTHROCYTE [DISTWIDTH] IN BLOOD BY AUTOMATED COUNT: 14.6 % (ref 12.3–15.4)
EXPIRATION DATE: NORMAL
EXPIRATION DATE: NORMAL
FLUAV AG NPH QL: NEGATIVE
FLUBV AG NPH QL: NEGATIVE
HCT VFR BLD AUTO: 38.5 % (ref 34–46.6)
HETEROPH AB SER QL LA: NEGATIVE
HGB BLD-MCNC: 12.5 G/DL (ref 12–15.9)
IMM GRANULOCYTES # BLD AUTO: 0.01 10*3/MM3 (ref 0–0.05)
IMM GRANULOCYTES NFR BLD AUTO: 0.1 % (ref 0–0.5)
INTERNAL CONTROL: NORMAL
INTERNAL CONTROL: NORMAL
LYMPHOCYTES # BLD AUTO: 1.52 10*3/MM3 (ref 0.7–3.1)
LYMPHOCYTES NFR BLD AUTO: 22 % (ref 19.6–45.3)
Lab: NORMAL
Lab: NORMAL
MCH RBC QN AUTO: 26.9 PG (ref 26.6–33)
MCHC RBC AUTO-ENTMCNC: 32.5 G/DL (ref 31.5–35.7)
MCV RBC AUTO: 82.8 FL (ref 79–97)
MONOCYTES # BLD AUTO: 0.6 10*3/MM3 (ref 0.1–0.9)
MONOCYTES NFR BLD AUTO: 8.7 % (ref 5–12)
NEUTROPHILS # BLD AUTO: 4.68 10*3/MM3 (ref 1.7–7)
NEUTROPHILS NFR BLD AUTO: 67.6 % (ref 42.7–76)
NRBC BLD AUTO-RTO: 0 /100 WBC (ref 0–0.2)
PLATELET # BLD AUTO: 229 10*3/MM3 (ref 140–450)
PMV BLD AUTO: 10.2 FL (ref 6–12)
RBC # BLD AUTO: 4.65 10*6/MM3 (ref 3.77–5.28)
S PYO AG THROAT QL: NEGATIVE
WBC NRBC COR # BLD: 6.92 10*3/MM3 (ref 3.4–10.8)

## 2020-02-06 PROCEDURE — 87804 INFLUENZA ASSAY W/OPTIC: CPT | Performed by: NURSE PRACTITIONER

## 2020-02-06 PROCEDURE — 99213 OFFICE O/P EST LOW 20 MIN: CPT | Performed by: NURSE PRACTITIONER

## 2020-02-06 PROCEDURE — 36415 COLL VENOUS BLD VENIPUNCTURE: CPT | Performed by: NURSE PRACTITIONER

## 2020-02-06 PROCEDURE — 86308 HETEROPHILE ANTIBODY SCREEN: CPT | Performed by: NURSE PRACTITIONER

## 2020-02-06 PROCEDURE — 85025 COMPLETE CBC W/AUTO DIFF WBC: CPT | Performed by: NURSE PRACTITIONER

## 2020-02-06 PROCEDURE — 87880 STREP A ASSAY W/OPTIC: CPT | Performed by: NURSE PRACTITIONER

## 2020-02-06 RX ORDER — AMOXICILLIN 500 MG/1
500 TABLET, FILM COATED ORAL 3 TIMES DAILY
Qty: 30 TABLET | Refills: 0 | Status: SHIPPED | OUTPATIENT
Start: 2020-02-06 | End: 2020-02-19

## 2020-02-06 NOTE — PROGRESS NOTES
Chief Complaint   Patient presents with   • Sore Throat     huting since last monday   • Cough   • Sinus pressure     Subjective   Nirmala Tamayo is a 24 y.o. female.     Cough   This is a new problem. The current episode started in the past 7 days. The problem has been gradually worsening. The problem occurs every few minutes. The cough is productive of sputum. Associated symptoms include ear congestion, hemoptysis, myalgias, nasal congestion, postnasal drip, rhinorrhea, a sore throat and shortness of breath. Pertinent negatives include no chest pain, chills, ear pain, fever, headaches, heartburn, rash, sweats, weight loss or wheezing. The treatment provided mild relief. Her past medical history is significant for bronchitis. There is no history of asthma, bronchiectasis, COPD, emphysema, environmental allergies or pneumonia.        The following portions of the patient's history were reviewed and updated as appropriate: allergies, current medications, past social history and problem list.    Review of Systems   Constitutional: Positive for activity change. Negative for appetite change, chills, fever and weight loss.   HENT: Positive for congestion, nosebleeds, postnasal drip, rhinorrhea, sinus pressure, sinus pain, sneezing and sore throat. Negative for ear pain.    Eyes: Negative.  Negative for visual disturbance.   Respiratory: Positive for cough, hemoptysis and shortness of breath. Negative for apnea, choking, chest tightness, wheezing and stridor.    Cardiovascular: Negative for chest pain, palpitations and leg swelling.   Gastrointestinal: Negative for heartburn.   Endocrine: Negative.    Musculoskeletal: Positive for myalgias.   Skin: Negative for rash.   Allergic/Immunologic: Negative.  Negative for environmental allergies.   Neurological: Negative for headaches.   Hematological: Negative.    Psychiatric/Behavioral: Negative.        Objective   /64   Temp 99.1 °F (37.3 °C) (Tympanic)   Ht  "175.3 cm (69\")   Wt 69.4 kg (153 lb)   LMP 12/17/2019 (Approximate)   SpO2 95%   BMI 22.59 kg/m²   Physical Exam   Constitutional: She is oriented to person, place, and time. She appears well-developed and well-nourished.   HENT:   Head: Normocephalic and atraumatic.   Right Ear: External ear normal.   Nose: Mucosal edema and rhinorrhea present.   Mouth/Throat: Oropharyngeal exudate present.   Eyes: Pupils are equal, round, and reactive to light. EOM are normal.   Neck: Normal range of motion. Neck supple. No thyromegaly present.   Cardiovascular: Normal rate, regular rhythm, normal heart sounds and intact distal pulses. Exam reveals no gallop and no friction rub.   No murmur heard.  Pulmonary/Chest: Effort normal. No stridor. No respiratory distress. She has no wheezes. She has no rales. She exhibits no tenderness.   Abdominal: Soft. Bowel sounds are normal. She exhibits no distension and no mass. There is no tenderness.   Musculoskeletal: Normal range of motion. She exhibits no edema, tenderness or deformity.   Neurological: She is alert and oriented to person, place, and time. She has normal reflexes. She displays normal reflexes. No cranial nerve deficit or sensory deficit. She exhibits normal muscle tone. Coordination normal.   Skin: Skin is warm and dry. No rash noted. No erythema. No pallor.   Psychiatric: She has a normal mood and affect.   Nursing note and vitals reviewed.      Assessment/Plan   Problem List Items Addressed This Visit        Respiratory    Sore throat    Acute upper respiratory infection    Relevant Medications    amoxicillin (AMOXIL) 500 MG tablet       Other    Fever - Primary    Relevant Orders    POC Influenza A / B (Completed)    POC Rapid Strep A (Completed)    CBC & Differential (Completed)    Mononucleosis Screen (Completed)    CBC Auto Differential (Completed)           New Medications Ordered This Visit   Medications   • amoxicillin (AMOXIL) 500 MG tablet     Sig: Take 1 " tablet by mouth 3 (Three) Times a Day.     Dispense:  30 tablet     Refill:  0       fluids, rest, meds as directed, return if worsen -labs today and will notify   Mono , flu and strep negative as directed

## 2020-02-14 DIAGNOSIS — O20.0 THREATENED ABORTION: ICD-10-CM

## 2020-02-19 ENCOUNTER — ROUTINE PRENATAL (OUTPATIENT)
Dept: OBSTETRICS AND GYNECOLOGY | Facility: CLINIC | Age: 25
End: 2020-02-19

## 2020-02-19 ENCOUNTER — APPOINTMENT (OUTPATIENT)
Dept: LAB | Facility: HOSPITAL | Age: 25
End: 2020-02-19

## 2020-02-19 VITALS — DIASTOLIC BLOOD PRESSURE: 60 MMHG | BODY MASS INDEX: 22.92 KG/M2 | SYSTOLIC BLOOD PRESSURE: 104 MMHG | WEIGHT: 155.2 LBS

## 2020-02-19 DIAGNOSIS — O21.0 MILD HYPEREMESIS GRAVIDARUM: ICD-10-CM

## 2020-02-19 DIAGNOSIS — Z3A.09 9 WEEKS GESTATION OF PREGNANCY: ICD-10-CM

## 2020-02-19 DIAGNOSIS — O20.0 THREATENED ABORTION: Primary | ICD-10-CM

## 2020-02-19 PROCEDURE — 0502F SUBSEQUENT PRENATAL CARE: CPT | Performed by: OBSTETRICS & GYNECOLOGY

## 2020-02-19 PROCEDURE — 87661 TRICHOMONAS VAGINALIS AMPLIF: CPT | Performed by: OBSTETRICS & GYNECOLOGY

## 2020-02-19 PROCEDURE — 87491 CHLMYD TRACH DNA AMP PROBE: CPT | Performed by: OBSTETRICS & GYNECOLOGY

## 2020-02-19 PROCEDURE — 87591 N.GONORRHOEAE DNA AMP PROB: CPT | Performed by: OBSTETRICS & GYNECOLOGY

## 2020-02-19 RX ORDER — PROMETHAZINE HYDROCHLORIDE 25 MG/1
12.5 TABLET ORAL EVERY 6 HOURS PRN
Qty: 30 TABLET | Refills: 11 | Status: SHIPPED | OUTPATIENT
Start: 2020-02-19 | End: 2020-08-28

## 2020-02-19 NOTE — PROGRESS NOTES
CC: Prenatal visit    Nirmala Tamayo is a 25 y.o.  at 9w1d.  Doing well.  No complaints.  Denies contractions, LOF, or VB.  Reports good FM.    /60   Wt 70.4 kg (155 lb 3.2 oz)   LMP 2019 (Approximate)   BMI 22.92 kg/m²   SVE: Not done     Fetal Heart Rate: 176     Problems (from 20 to present)     No problems associated with this episode.          A/P: Nirmala Tamayo is a 25 y.o.  at 9w1d.  - RTC in 2 weeks     Diagnosis Plan   1. Threatened    patient had had some bleeding still some occasional cramping ultrasound shows fetal heart activity today   2. 9 weeks gestation of pregnancy     10 use to have nausea and vomiting not tolerating Reglan will try a lower dose Phenergan  Tyler Lock MD  2020  5:55 PM

## 2020-02-20 LAB
C TRACH RRNA CVX QL NAA+PROBE: NEGATIVE
N GONORRHOEA RRNA SPEC QL NAA+PROBE: NEGATIVE
TRICHOMONAS VAGINALIS PCR: NEGATIVE

## 2020-02-25 ENCOUNTER — TELEPHONE (OUTPATIENT)
Dept: OBSTETRICS AND GYNECOLOGY | Facility: CLINIC | Age: 25
End: 2020-02-25

## 2020-02-25 NOTE — TELEPHONE ENCOUNTER
Patient states she hasnt had a bowel movement and she had taken the medication that Dr Lock suggested. So wants to know what else she can take.

## 2020-02-25 NOTE — TELEPHONE ENCOUNTER
I RETURNED THE PATIENT'S PHONE CALL, SHE STATED THAT SHE HAS BEEN TAKING THE COLACE AND MIRALAX BUT SHE DOESN'T FEEL LIKE THE COLACE IS WORKING. I GOT A PREGNANCY MEDICATION LIST AND READ TO HER THE DIFFERENT OPTIONS FOR HER TO TRY, SHE IS GOING TO TRY THE METAMUCIL INSTEAD OF COLACE AND SEE IF THAT HELPS HER. I TOLD HER IF SHE NEEDED ANYTHING ELSE TO GIVE ME A CALL. PATIENT VERBALIZED UNDERSTANDING.

## 2020-02-27 DIAGNOSIS — O26.851 SPOTTING IN FIRST TRIMESTER: Primary | ICD-10-CM

## 2020-03-02 DIAGNOSIS — O26.851 SPOTTING IN FIRST TRIMESTER: ICD-10-CM

## 2020-03-11 ENCOUNTER — LAB (OUTPATIENT)
Dept: LAB | Facility: HOSPITAL | Age: 25
End: 2020-03-11

## 2020-03-11 ENCOUNTER — TELEPHONE (OUTPATIENT)
Dept: OBSTETRICS AND GYNECOLOGY | Facility: CLINIC | Age: 25
End: 2020-03-11

## 2020-03-11 ENCOUNTER — ROUTINE PRENATAL (OUTPATIENT)
Dept: OBSTETRICS AND GYNECOLOGY | Facility: CLINIC | Age: 25
End: 2020-03-11

## 2020-03-11 VITALS — BODY MASS INDEX: 22.71 KG/M2 | WEIGHT: 153.8 LBS | SYSTOLIC BLOOD PRESSURE: 98 MMHG | DIASTOLIC BLOOD PRESSURE: 62 MMHG

## 2020-03-11 DIAGNOSIS — Z3A.12 12 WEEKS GESTATION OF PREGNANCY: ICD-10-CM

## 2020-03-11 DIAGNOSIS — R35.0 URINARY FREQUENCY: Primary | ICD-10-CM

## 2020-03-11 DIAGNOSIS — Z87.59 HISTORY OF GESTATIONAL HYPERTENSION: ICD-10-CM

## 2020-03-11 DIAGNOSIS — O09.291 HX OF PREECLAMPSIA, PRIOR PREGNANCY, CURRENTLY PREGNANT, FIRST TRIMESTER: ICD-10-CM

## 2020-03-11 DIAGNOSIS — O20.0 THREATENED ABORTION: ICD-10-CM

## 2020-03-11 DIAGNOSIS — R35.0 URINARY FREQUENCY: ICD-10-CM

## 2020-03-11 LAB — PROT 24H UR-MRATE: 82 MG/24HOURS (ref 0–150)

## 2020-03-11 PROCEDURE — 81001 URINALYSIS AUTO W/SCOPE: CPT

## 2020-03-11 PROCEDURE — 81050 URINALYSIS VOLUME MEASURE: CPT

## 2020-03-11 PROCEDURE — 84156 ASSAY OF PROTEIN URINE: CPT

## 2020-03-11 PROCEDURE — 87086 URINE CULTURE/COLONY COUNT: CPT

## 2020-03-11 PROCEDURE — 0502F SUBSEQUENT PRENATAL CARE: CPT | Performed by: OBSTETRICS & GYNECOLOGY

## 2020-03-11 RX ORDER — POLYETHYLENE GLYCOL 3350 17 G/17G
17 POWDER, FOR SOLUTION ORAL DAILY
COMMUNITY
End: 2021-02-25

## 2020-03-11 RX ORDER — DOCUSATE SODIUM 100 MG/1
100 CAPSULE, LIQUID FILLED ORAL 2 TIMES DAILY
COMMUNITY
End: 2021-02-25

## 2020-03-11 NOTE — PROGRESS NOTES
CC: Prenatal visit    Nirmala Tamayo is a 25 y.o.  at 12w1d.  Doing well.  No complaints.  Denies contractions, LOF, or VB.  Reports good FM.    BP 98/62   Wt 69.8 kg (153 lb 12.8 oz)   LMP 2019 (Approximate)   BMI 22.71 kg/m²   SVE: Not done     Fetal Heart Rate: 150     Problems (from 20 to present)     No problems associated with this episode.          A/P: Nirmala Tamayo is a 25 y.o.  at 12w1d.  - RTC in 2 weeks     Diagnosis Plan   1. Urinary frequency  Urinalysis With Culture If Indicated -   2. 12 weeks gestation of pregnancy     Threatened  patient with cramping at site ultrasound shows clear fetal heart activity  Tyler Lock MD  3/11/2020  18:59

## 2020-03-11 NOTE — TELEPHONE ENCOUNTER
THIS PT WAS HERE EARLIER TODAY AND SHE HAS BEEN CRAMPING ALL DAY. SHE IS REQUESTING THAT YOU PLEASE CALL HER ASAP.

## 2020-03-11 NOTE — TELEPHONE ENCOUNTER
PATIENT CALLED AND STATED THAT SHE HAS BEEN CRAMPING. SHE STATED THAT AS THE DAY HAS WENT ON IT HAS GOTTEN WORSE. I LET HER SPEAK WITH DR. HENDRICKSON.

## 2020-03-12 LAB
BACTERIA UR QL AUTO: ABNORMAL /HPF
BILIRUB UR QL STRIP: NEGATIVE
CLARITY UR: ABNORMAL
COLOR UR: YELLOW
GLUCOSE UR STRIP-MCNC: NEGATIVE MG/DL
HGB UR QL STRIP.AUTO: NEGATIVE
HYALINE CASTS UR QL AUTO: ABNORMAL /LPF
KETONES UR QL STRIP: NEGATIVE
LEUKOCYTE ESTERASE UR QL STRIP.AUTO: ABNORMAL
NITRITE UR QL STRIP: NEGATIVE
PH UR STRIP.AUTO: 6 [PH] (ref 5–8)
PROT UR QL STRIP: NEGATIVE
RBC # UR: ABNORMAL /HPF
REF LAB TEST METHOD: ABNORMAL
SP GR UR STRIP: 1.02 (ref 1–1.03)
SQUAMOUS #/AREA URNS HPF: ABNORMAL /HPF
UROBILINOGEN UR QL STRIP: ABNORMAL
WBC UR QL AUTO: ABNORMAL /HPF

## 2020-03-13 LAB — BACTERIA SPEC AEROBE CULT: NORMAL

## 2020-03-18 ENCOUNTER — TELEPHONE (OUTPATIENT)
Dept: OBSTETRICS AND GYNECOLOGY | Facility: CLINIC | Age: 25
End: 2020-03-18

## 2020-03-18 NOTE — TELEPHONE ENCOUNTER
Reviewed urine culture which was contamination.  No symptoms at this point will not repeat at this point.  To consider repeating when she comes in

## 2020-03-31 ENCOUNTER — TELEPHONE (OUTPATIENT)
Dept: OBSTETRICS AND GYNECOLOGY | Facility: CLINIC | Age: 25
End: 2020-03-31

## 2020-03-31 NOTE — TELEPHONE ENCOUNTER
Called the patient and let her know that no visitors are allowed in the building. Patient verbalized understanding.

## 2020-04-01 ENCOUNTER — ROUTINE PRENATAL (OUTPATIENT)
Dept: OBSTETRICS AND GYNECOLOGY | Facility: CLINIC | Age: 25
End: 2020-04-01

## 2020-04-01 VITALS — WEIGHT: 157 LBS | DIASTOLIC BLOOD PRESSURE: 70 MMHG | SYSTOLIC BLOOD PRESSURE: 112 MMHG | BODY MASS INDEX: 23.18 KG/M2

## 2020-04-01 DIAGNOSIS — Z3A.15 15 WEEKS GESTATION OF PREGNANCY: ICD-10-CM

## 2020-04-01 DIAGNOSIS — O26.892 RH NEGATIVE STATUS DURING PREGNANCY IN SECOND TRIMESTER: ICD-10-CM

## 2020-04-01 DIAGNOSIS — O20.0 THREATENED ABORTION: Primary | ICD-10-CM

## 2020-04-01 DIAGNOSIS — Z67.91 RH NEGATIVE STATUS DURING PREGNANCY IN SECOND TRIMESTER: ICD-10-CM

## 2020-04-01 PROBLEM — Z3A.12 12 WEEKS GESTATION OF PREGNANCY: Status: RESOLVED | Noted: 2020-03-11 | Resolved: 2020-04-01

## 2020-04-01 PROCEDURE — 0502F SUBSEQUENT PRENATAL CARE: CPT | Performed by: OBSTETRICS & GYNECOLOGY

## 2020-04-01 NOTE — PROGRESS NOTES
CC: Prenatal visit    Nirmala Tamayo is a 25 y.o.  at 15w1d.  Doing well.  No complaints.  Denies contractions, LOF, or VB.  Reports good FM.    /70   Wt 71.2 kg (157 lb)   LMP 2019 (Approximate)   BMI 23.18 kg/m²   SVE: Not done           Problems (from 20 to present)     Problem Noted Resolved    Rh negative status during pregnancy in second trimester 2020 by Tyler Lock MD No    Priority:  High            A/P: Nirmala Tamayo is a 25 y.o.  at 15w1d.  - RTC in 6 weeks  - Reviewed COVID-19 visitation policy  - Reviewed COVID-19 precautions     Diagnosis Plan   1. Threatened    no more bleeding.   2. 15 weeks gestation of pregnancy     3. Rh negative status during pregnancy in second trimester       Tyler Lock MD  2020  15:46

## 2020-04-03 DIAGNOSIS — Z36.3 ENCOUNTER FOR ANTENATAL SCREENING FOR MALFORMATION USING ULTRASOUND: Primary | ICD-10-CM

## 2020-04-03 RX ORDER — ALBUTEROL SULFATE 90 UG/1
2 AEROSOL, METERED RESPIRATORY (INHALATION) EVERY 4 HOURS PRN
Qty: 1 INHALER | Refills: 5 | Status: SHIPPED | OUTPATIENT
Start: 2020-04-03 | End: 2021-01-28 | Stop reason: SDUPTHER

## 2020-04-07 DIAGNOSIS — Z36.3 ENCOUNTER FOR ANTENATAL SCREENING FOR MALFORMATION USING ULTRASOUND: ICD-10-CM

## 2020-04-20 ENCOUNTER — ROUTINE PRENATAL (OUTPATIENT)
Dept: OBSTETRICS AND GYNECOLOGY | Facility: CLINIC | Age: 25
End: 2020-04-20

## 2020-04-20 VITALS — SYSTOLIC BLOOD PRESSURE: 118 MMHG | BODY MASS INDEX: 24.28 KG/M2 | DIASTOLIC BLOOD PRESSURE: 70 MMHG | WEIGHT: 164.4 LBS

## 2020-04-20 DIAGNOSIS — G44.201 ACUTE INTRACTABLE TENSION-TYPE HEADACHE: Primary | ICD-10-CM

## 2020-04-20 DIAGNOSIS — O09.299 HX OF PREECLAMPSIA, PRIOR PREGNANCY, CURRENTLY PREGNANT: ICD-10-CM

## 2020-04-20 DIAGNOSIS — Z98.891 HISTORY OF 2 CESAREAN SECTIONS: ICD-10-CM

## 2020-04-20 DIAGNOSIS — O26.892 RH NEGATIVE STATUS DURING PREGNANCY IN SECOND TRIMESTER: ICD-10-CM

## 2020-04-20 DIAGNOSIS — Z67.91 RH NEGATIVE STATUS DURING PREGNANCY IN SECOND TRIMESTER: ICD-10-CM

## 2020-04-20 PROBLEM — R51.9 HEADACHE: Status: ACTIVE | Noted: 2020-04-20

## 2020-04-20 PROCEDURE — 0502F SUBSEQUENT PRENATAL CARE: CPT | Performed by: OBSTETRICS & GYNECOLOGY

## 2020-04-20 RX ORDER — ASPIRIN/CALCIUM/MAG/ALUMINUM 325 MG
81 TABLET ORAL DAILY
Qty: 30 EACH | Refills: 2 | Status: SHIPPED | OUTPATIENT
Start: 2020-04-20 | End: 2020-05-20

## 2020-04-20 RX ORDER — CYCLOBENZAPRINE HCL 10 MG
10 TABLET ORAL EVERY 8 HOURS PRN
Qty: 15 TABLET | Refills: 0 | Status: SHIPPED | OUTPATIENT
Start: 2020-04-20 | End: 2020-06-10

## 2020-04-20 NOTE — PROGRESS NOTES
CC: Prenatal visit    Nirmala Tamayo is a 25 y.o.  at 17w6d.  Doing well.  No complaints.  Denies contractions, LOF, or VB.  Reports good FM.    /70   Wt 74.6 kg (164 lb 6.4 oz)   LMP 2019 (Approximate)   BMI 24.28 kg/m²   SVE: Not done     Fetal Heart Rate: 166     Problems (from 20 to present)     Problem Noted Resolved    History of 2  sections 2020 by Tyler Lock MD No    Priority:  High      Rh negative status during pregnancy in second trimester 2020 by Tyler Lock MD No    Priority:  High            A/P: Nirmala Tamayo is a 25 y.o.  at 17w6d.  - RTC in 2 weeks  - Reviewed COVID-19 visitation policy  - Reviewed COVID-19 precautions     Diagnosis Plan   1. Acute intractable tension-type headache  CBC (No Diff)    Comprehensive Metabolic Panel    Protein / Creatinine Ratio, Urine - Urine, Clean Catch patient complains of headache in occipital region up from neck she says she thinks it is a muscle spasm.  She does have a history of preeclampsia so when she called with this complaint we had her come in.  She denies headaches visual changes or epigastric pain DTRs are 2+/4.  She does have some tension of the paraspinous muscles were gone try Flexeril after reviewing risk benefits alternatives but we will also check labs   2. Rh negative status during pregnancy in second trimester     3. History of 2  sections     4. Hx of preeclampsia, prior pregnancy, currently pregnant       Tyler Lock MD  2020  17:15

## 2020-04-21 ENCOUNTER — LAB (OUTPATIENT)
Dept: LAB | Facility: HOSPITAL | Age: 25
End: 2020-04-21

## 2020-04-21 DIAGNOSIS — G44.201 ACUTE INTRACTABLE TENSION-TYPE HEADACHE: ICD-10-CM

## 2020-04-21 LAB
ALBUMIN SERPL-MCNC: 3.7 G/DL (ref 3.5–5.2)
ALBUMIN/GLOB SERPL: 1.3 G/DL
ALP SERPL-CCNC: 46 U/L (ref 39–117)
ALT SERPL W P-5'-P-CCNC: 25 U/L (ref 1–33)
ANION GAP SERPL CALCULATED.3IONS-SCNC: 9.4 MMOL/L (ref 5–15)
AST SERPL-CCNC: 23 U/L (ref 1–32)
BILIRUB SERPL-MCNC: 0.2 MG/DL (ref 0.2–1.2)
BUN BLD-MCNC: 6 MG/DL (ref 6–20)
BUN/CREAT SERPL: 10.5 (ref 7–25)
CALCIUM SPEC-SCNC: 9.2 MG/DL (ref 8.6–10.5)
CHLORIDE SERPL-SCNC: 99 MMOL/L (ref 98–107)
CO2 SERPL-SCNC: 25.6 MMOL/L (ref 22–29)
CREAT BLD-MCNC: 0.57 MG/DL (ref 0.57–1)
CREAT UR-MCNC: 80.4 MG/DL
DEPRECATED RDW RBC AUTO: 44.7 FL (ref 37–54)
ERYTHROCYTE [DISTWIDTH] IN BLOOD BY AUTOMATED COUNT: 14.8 % (ref 12.3–15.4)
GFR SERPL CREATININE-BSD FRML MDRD: 129 ML/MIN/1.73
GLOBULIN UR ELPH-MCNC: 2.9 GM/DL
GLUCOSE BLD-MCNC: 119 MG/DL (ref 65–99)
HCT VFR BLD AUTO: 35.4 % (ref 34–46.6)
HGB BLD-MCNC: 12.2 G/DL (ref 12–15.9)
MCH RBC QN AUTO: 29 PG (ref 26.6–33)
MCHC RBC AUTO-ENTMCNC: 34.5 G/DL (ref 31.5–35.7)
MCV RBC AUTO: 84.3 FL (ref 79–97)
PLATELET # BLD AUTO: 185 10*3/MM3 (ref 140–450)
PMV BLD AUTO: 10.6 FL (ref 6–12)
POTASSIUM BLD-SCNC: 3.9 MMOL/L (ref 3.5–5.2)
PROT SERPL-MCNC: 6.6 G/DL (ref 6–8.5)
PROT UR-MCNC: 10 MG/DL
PROT/CREAT UR: 124.4 MG/G CREA (ref 0–200)
RBC # BLD AUTO: 4.2 10*6/MM3 (ref 3.77–5.28)
SODIUM BLD-SCNC: 134 MMOL/L (ref 136–145)
WBC NRBC COR # BLD: 8.31 10*3/MM3 (ref 3.4–10.8)

## 2020-04-21 PROCEDURE — 82570 ASSAY OF URINE CREATININE: CPT | Performed by: OBSTETRICS & GYNECOLOGY

## 2020-04-21 PROCEDURE — 36415 COLL VENOUS BLD VENIPUNCTURE: CPT

## 2020-04-21 PROCEDURE — 84156 ASSAY OF PROTEIN URINE: CPT | Performed by: OBSTETRICS & GYNECOLOGY

## 2020-04-21 PROCEDURE — 80053 COMPREHEN METABOLIC PANEL: CPT

## 2020-04-21 PROCEDURE — 85027 COMPLETE CBC AUTOMATED: CPT

## 2020-04-22 ENCOUNTER — ROUTINE PRENATAL (OUTPATIENT)
Dept: OBSTETRICS AND GYNECOLOGY | Facility: CLINIC | Age: 25
End: 2020-04-22

## 2020-04-22 VITALS — WEIGHT: 166 LBS | SYSTOLIC BLOOD PRESSURE: 114 MMHG | DIASTOLIC BLOOD PRESSURE: 72 MMHG | BODY MASS INDEX: 24.51 KG/M2

## 2020-04-22 DIAGNOSIS — Z67.91 RH NEGATIVE STATUS DURING PREGNANCY IN SECOND TRIMESTER: ICD-10-CM

## 2020-04-22 DIAGNOSIS — O26.892 RH NEGATIVE STATUS DURING PREGNANCY IN SECOND TRIMESTER: ICD-10-CM

## 2020-04-22 DIAGNOSIS — Z3A.18 18 WEEKS GESTATION OF PREGNANCY: ICD-10-CM

## 2020-04-22 DIAGNOSIS — Z36.3 ENCOUNTER FOR ANTENATAL SCREENING FOR MALFORMATION USING ULTRASOUND: Primary | ICD-10-CM

## 2020-04-22 DIAGNOSIS — O20.0 THREATENED ABORTION: ICD-10-CM

## 2020-04-22 PROBLEM — Z98.891 HISTORY OF 2 CESAREAN SECTIONS: Status: RESOLVED | Noted: 2020-04-20 | Resolved: 2020-04-22

## 2020-04-22 PROCEDURE — 0502F SUBSEQUENT PRENATAL CARE: CPT | Performed by: OBSTETRICS & GYNECOLOGY

## 2020-04-22 NOTE — PROGRESS NOTES
CC: Prenatal visit    Nirmala Tamayo is a 25 y.o.  at 18w1d.  Doing well.  No complaints.  Denies contractions, LOF, or VB.  Reports good FM.    /72   Wt 75.3 kg (166 lb)   LMP 2019 (Approximate)   Breastfeeding Unknown   BMI 24.51 kg/m²   SVE: Not done     Fetal Heart Rate: 163     Problems (from 20 to 20)     Problem Noted Resolved    Rh negative status during pregnancy in second trimester 2020 by Tyler Lock MD No    Priority:  High            A/P: Nirmala Tamayo is a 25 y.o.  at 18w1d.  - RTC in 4 weeks  - Reviewed COVID-19 visitation policy  - Reviewed COVID-19 precautions     Diagnosis Plan   1. Encounter for  screening for malformation using ultrasound  US Ob 14 + Weeks Single or First Gestation   2. Rh negative status during pregnancy in second trimester   no bleeding today RhoGam not indicated   3. 18 weeks gestation of pregnancy     4. Threatened    cramping but no bleeding ultrasound reassuring cervix 3.6 preliminary reading   Headache has resolved.  Went over lab work no evidence of preeclampsia  Tyler Lock MD  2020  16:18

## 2020-04-27 DIAGNOSIS — Z36.3 ENCOUNTER FOR ANTENATAL SCREENING FOR MALFORMATION USING ULTRASOUND: ICD-10-CM

## 2020-04-28 ENCOUNTER — PATIENT MESSAGE (OUTPATIENT)
Dept: OBSTETRICS AND GYNECOLOGY | Facility: CLINIC | Age: 25
End: 2020-04-28

## 2020-04-29 RX ORDER — FLUCONAZOLE 150 MG/1
150 TABLET ORAL DAILY
Qty: 2 TABLET | Refills: 2 | Status: SHIPPED | OUTPATIENT
Start: 2020-04-29 | End: 2020-05-01

## 2020-04-29 NOTE — TELEPHONE ENCOUNTER
From: Nirmala Tamayo  To: Tyler Lock MD  Sent: 4/28/2020 8:13 AM CDT  Subject: Non-Urgent Medical Question    Hello! I’ve been having yeast infection symptoms and I did the 3 day [employer] brand medication for yeast infections and I feel like it’s still not working. It may have helped a little but I’m still uncomfortable with itching. I didn’t have this problem last week at the appointment (or I didn’t notice it yet) is there something else I should do?    Thanks.

## 2020-05-04 ENCOUNTER — TELEPHONE (OUTPATIENT)
Dept: FAMILY MEDICINE CLINIC | Facility: CLINIC | Age: 25
End: 2020-05-04

## 2020-05-04 NOTE — TELEPHONE ENCOUNTER
Pt called and was asking about the antibodies test for COVID, her callback number is [phone].

## 2020-05-04 NOTE — TELEPHONE ENCOUNTER
Can you let her know they are now not doing the testing until further notice. I will let her know when they open it up for the public

## 2020-05-20 ENCOUNTER — ROUTINE PRENATAL (OUTPATIENT)
Dept: OBSTETRICS AND GYNECOLOGY | Facility: CLINIC | Age: 25
End: 2020-05-20

## 2020-05-20 VITALS — WEIGHT: 174.2 LBS | BODY MASS INDEX: 25.72 KG/M2 | SYSTOLIC BLOOD PRESSURE: 102 MMHG | DIASTOLIC BLOOD PRESSURE: 68 MMHG

## 2020-05-20 DIAGNOSIS — Z67.91 RH NEGATIVE STATUS DURING PREGNANCY IN SECOND TRIMESTER: ICD-10-CM

## 2020-05-20 DIAGNOSIS — O26.892 RH NEGATIVE STATUS DURING PREGNANCY IN SECOND TRIMESTER: ICD-10-CM

## 2020-05-20 DIAGNOSIS — Z64.1 MULTIGRAVIDA: Primary | ICD-10-CM

## 2020-05-20 DIAGNOSIS — Z3A.22 22 WEEKS GESTATION OF PREGNANCY: ICD-10-CM

## 2020-05-20 PROCEDURE — 0502F SUBSEQUENT PRENATAL CARE: CPT | Performed by: OBSTETRICS & GYNECOLOGY

## 2020-05-21 NOTE — PROGRESS NOTES
CC: Prenatal visit    Nirmala Tamayo is a 25 y.o.  at 22w1d.  Doing well.  Denies contractions, LOF, or VB.  Reports good FM.    /68   Wt 79 kg (174 lb 3.2 oz)   LMP 2019 (Approximate)   BMI 25.72 kg/m²   SVE: Not done     Fetal Heart Rate: 158     Problems (from 20 to present)     Problem Noted Resolved    Rh negative status during pregnancy in second trimester 2020 by Tyler Lock MD No    Priority:  High            A/P: Nirmala Tamayo is a 25 y.o.  at 22w1d.  - RTC in 4 weeks  - Reviewed COVID-19 visitation policy  - Reviewed COVID-19 precautions     Diagnosis Plan   1. Multigravida  Glucose, Post 50 Gm Glucola   2. Rh negative status during pregnancy in second trimester  Antibody Screen   3. 22 weeks gestation of pregnancy       Tyler Lock MD  2020  21:38

## 2020-05-28 DIAGNOSIS — Z36.2 ENCOUNTER FOR OTHER ANTENATAL SCREENING FOLLOW-UP: Primary | ICD-10-CM

## 2020-05-29 DIAGNOSIS — Z36.2 ENCOUNTER FOR OTHER ANTENATAL SCREENING FOLLOW-UP: ICD-10-CM

## 2020-06-08 ENCOUNTER — TELEPHONE (OUTPATIENT)
Dept: OBSTETRICS AND GYNECOLOGY | Facility: CLINIC | Age: 25
End: 2020-06-08

## 2020-06-08 ENCOUNTER — HOSPITAL ENCOUNTER (OUTPATIENT)
Facility: HOSPITAL | Age: 25
Discharge: HOME | End: 2020-06-08
Attending: OBSTETRICS & GYNECOLOGY | Admitting: OBSTETRICS & GYNECOLOGY

## 2020-06-08 VITALS
HEIGHT: 69 IN | TEMPERATURE: 98.9 F | SYSTOLIC BLOOD PRESSURE: 119 MMHG | WEIGHT: 175 LBS | BODY MASS INDEX: 25.92 KG/M2 | RESPIRATION RATE: 18 BRPM | DIASTOLIC BLOOD PRESSURE: 79 MMHG | HEART RATE: 83 BPM

## 2020-06-08 PROCEDURE — 63710000001 ONDANSETRON ODT 4 MG TABLET DISPERSIBLE: Performed by: OBSTETRICS & GYNECOLOGY

## 2020-06-08 PROCEDURE — G0463 HOSPITAL OUTPT CLINIC VISIT: HCPCS

## 2020-06-08 PROCEDURE — 59025 FETAL NON-STRESS TEST: CPT

## 2020-06-08 PROCEDURE — 59025 FETAL NON-STRESS TEST: CPT | Performed by: OBSTETRICS & GYNECOLOGY

## 2020-06-08 RX ORDER — ONDANSETRON 4 MG/1
8 TABLET, ORALLY DISINTEGRATING ORAL EVERY 6 HOURS PRN
Status: DISCONTINUED | OUTPATIENT
Start: 2020-06-08 | End: 2020-06-08 | Stop reason: HOSPADM

## 2020-06-08 RX ORDER — CYCLOBENZAPRINE HCL 10 MG
10 TABLET ORAL ONCE
Status: COMPLETED | OUTPATIENT
Start: 2020-06-08 | End: 2020-06-08

## 2020-06-08 RX ORDER — HYDROCODONE BITARTRATE AND ACETAMINOPHEN 5; 325 MG/1; MG/1
1 TABLET ORAL EVERY 4 HOURS PRN
COMMUNITY
Start: 2020-06-08 | End: 2020-07-01

## 2020-06-08 RX ORDER — PENICILLIN V POTASSIUM 500 MG/1
500 TABLET ORAL 3 TIMES DAILY
COMMUNITY
Start: 2020-06-04 | End: 2020-06-08

## 2020-06-08 RX ORDER — AMOXICILLIN 500 MG/1
500 CAPSULE ORAL 4 TIMES DAILY
COMMUNITY
Start: 2020-06-08 | End: 2020-07-01

## 2020-06-08 RX ADMIN — CYCLOBENZAPRINE HYDROCHLORIDE 10 MG: 10 TABLET, FILM COATED ORAL at 19:52

## 2020-06-08 RX ADMIN — ONDANSETRON 8 MG: 4 TABLET, ORALLY DISINTEGRATING ORAL at 19:51

## 2020-06-08 NOTE — TELEPHONE ENCOUNTER
PT HAD SOME DENTAL WORK DONE OVER THE WEEKEND AND SHE IS REQUESTING THAT SOMEONE CALL HER BACK ASAP.  SHE IS WONDERING IF THERE IS ANYTHING THAT YOU WOULD SUGGEST SHE DO FOR PAIN OTHER THAN TYLENOL.

## 2020-06-08 NOTE — TELEPHONE ENCOUNTER
I RETURNED THE PATIENTS CALL. SHE HAD DENTAL WORK DONE AND SHE HAS TO GO BACK TODAY. SHE WANTED TO KNOW IF THERE WAS ANYTHING HE COULD GIVE HER FOR THE PAIN BESIDE TYLENOL. I TOLD HER I WOULD FAX A DENTAL LETTER TO DR. MORA AND THAT HE COULD DECIDE WHAT TO GIVE HER. PATIENT SAID SHE HAD A HEADACHE AND WANTED TO KNOW IF IT WAS BECAUSE OF HER TOOTH. I TOLD HER TO TALK TO DR. MORA AND IF HE SAID NO THEN CALL AND WE WOULD SEE HER. PATIENT VERBALIZED UNDERSTANDING.

## 2020-06-09 NOTE — NON STRESS TEST
Nirmala Tamayo, a  at 24w6d with an ELISE of 2020, by Last Menstrual Period, was seen at [facility] for a nonstress test.    Chief Complaint   Patient presents with   • Headache       Patient Active Problem List   Diagnosis   • Sore throat   • Anxiety   • Fever   • Low back pain with radiation   • Lumbar disc disorder   • Irritable bowel syndrome with constipation   • Epigastric pain   • Acute upper respiratory infection   • Left otitis media   • Acne vulgaris   • Encounter for surveillance of contraceptive pills   • Acute bronchiolitis   • Cough   • Threatened    • 9 weeks gestation of pregnancy   • Urinary frequency   • 15 weeks gestation of pregnancy   • Rh negative status during pregnancy in second trimester   • Headache   • 18 weeks gestation of pregnancy   • Encounter for  screening for malformation using ultrasound   • Multigravida       Start Time:   Stop Time:      Patient presenting for complaints of headache and occasional. Patient states that she had a root canal done in dentist office today and has had pain since procedure due to dental work. Patient left side of face slightly swollen due to dental work. Patient denies regular ctx and reports active fetal movement. Pt denies vag bleeding, leaking of fluid. PT cervix closed, thick, and posterior. POC discussed. NST reactive for gestational age. 10x10 accels noted. MD reviewed strip. Orders received.

## 2020-06-09 NOTE — DISCHARGE INSTRUCTIONS
Return to hospital for intense/regular contractions; water breaks; decreased fetal movement. Call primary provider for any questions or concerns.

## 2020-06-10 ENCOUNTER — TELEPHONE (OUTPATIENT)
Dept: OBSTETRICS AND GYNECOLOGY | Facility: CLINIC | Age: 25
End: 2020-06-10

## 2020-06-10 ENCOUNTER — ROUTINE PRENATAL (OUTPATIENT)
Dept: OBSTETRICS AND GYNECOLOGY | Facility: CLINIC | Age: 25
End: 2020-06-10

## 2020-06-10 VITALS — BODY MASS INDEX: 26.29 KG/M2 | SYSTOLIC BLOOD PRESSURE: 118 MMHG | WEIGHT: 178 LBS | DIASTOLIC BLOOD PRESSURE: 68 MMHG

## 2020-06-10 DIAGNOSIS — O47.00 PRETERM UTERINE CONTRACTIONS, ANTEPARTUM: Primary | ICD-10-CM

## 2020-06-10 DIAGNOSIS — Z3A.25 25 WEEKS GESTATION OF PREGNANCY: ICD-10-CM

## 2020-06-10 DIAGNOSIS — Z67.91 RH NEGATIVE STATUS DURING PREGNANCY IN SECOND TRIMESTER: ICD-10-CM

## 2020-06-10 DIAGNOSIS — O26.892 RH NEGATIVE STATUS DURING PREGNANCY IN SECOND TRIMESTER: ICD-10-CM

## 2020-06-10 PROCEDURE — 0502F SUBSEQUENT PRENATAL CARE: CPT | Performed by: OBSTETRICS & GYNECOLOGY

## 2020-06-10 NOTE — TELEPHONE ENCOUNTER
Patient called again with concerns about her pain. Says it feels more like cramping than sciatica pain. Made her an appointment to come in and see dr grande today to address these concerns.

## 2020-06-10 NOTE — TELEPHONE ENCOUNTER
Patient called and was requesting to speak with someone about concerns with shooting pains down into her pelvis. Spoke with dr harry, she said this sounded like some sciatica nerve pain. Let patient know and told her to just make sure the baby is moving okay.    Patient verbalized understanding

## 2020-06-11 NOTE — PROGRESS NOTES
CC: Prenatal visit    Nirmala Tamayo is a 25 y.o.  at 25w1d.  Doing well.  Denies contractions, LOF, or VB.  Reports good FM.    /68   Wt 80.7 kg (178 lb)   LMP 2019 (Approximate)   BMI 26.29 kg/m²   SVE: Closed  Fundal Height (cm): 26 cm  Fetal Heart Rate: 153     Problems (from 20 to present)     Problem Noted Resolved    Rh negative status during pregnancy in second trimester 2020 by Tyler Lock MD No    Priority:  High            A/P: Nirmala Tamayo is a 25 y.o.  at 25w1d.  - RTC in 2 weeks  - Reviewed COVID-19 visitation policy  - Reviewed COVID-19 precautions     Diagnosis Plan   1.  uterine contractions, antepartum  US Ob Transvaginal transvaginal ultrasound today shows cervical length 3.8 reassuring   2. Rh negative status during pregnancy in second trimester     3. 25 weeks gestation of pregnancy     Patient called complaining of contractions with walking cervical length reassuring cervix closed think low risk  delivery  Tyler Lock MD  6/10/2020  19:14

## 2020-06-17 ENCOUNTER — ROUTINE PRENATAL (OUTPATIENT)
Dept: OBSTETRICS AND GYNECOLOGY | Facility: CLINIC | Age: 25
End: 2020-06-17

## 2020-06-17 ENCOUNTER — LAB (OUTPATIENT)
Dept: LAB | Facility: HOSPITAL | Age: 25
End: 2020-06-17

## 2020-06-17 VITALS — WEIGHT: 181.2 LBS | SYSTOLIC BLOOD PRESSURE: 102 MMHG | BODY MASS INDEX: 26.76 KG/M2 | DIASTOLIC BLOOD PRESSURE: 68 MMHG

## 2020-06-17 DIAGNOSIS — Z67.91 RH NEGATIVE STATUS DURING PREGNANCY IN SECOND TRIMESTER: ICD-10-CM

## 2020-06-17 DIAGNOSIS — O26.849 FETAL SIZE INCONSISTENT WITH DATES: Primary | ICD-10-CM

## 2020-06-17 DIAGNOSIS — O26.892 RH NEGATIVE STATUS DURING PREGNANCY IN SECOND TRIMESTER: ICD-10-CM

## 2020-06-17 DIAGNOSIS — Z3A.26 26 WEEKS GESTATION OF PREGNANCY: ICD-10-CM

## 2020-06-17 DIAGNOSIS — Z87.51 HISTORY OF PREMATURE DELIVERY: ICD-10-CM

## 2020-06-17 DIAGNOSIS — Z64.1 MULTIGRAVIDA: ICD-10-CM

## 2020-06-17 LAB
BLD GP AB SCN SERPL QL: NEGATIVE
GLUCOSE 1H P 100 G GLC PO SERPL-MCNC: 132 MG/DL (ref 60–140)

## 2020-06-17 PROCEDURE — 36415 COLL VENOUS BLD VENIPUNCTURE: CPT

## 2020-06-17 PROCEDURE — 86850 RBC ANTIBODY SCREEN: CPT

## 2020-06-17 PROCEDURE — 82950 GLUCOSE TEST: CPT

## 2020-06-17 PROCEDURE — 0502F SUBSEQUENT PRENATAL CARE: CPT | Performed by: OBSTETRICS & GYNECOLOGY

## 2020-06-18 NOTE — PROGRESS NOTES
CC: Prenatal visit    Nirmala Tamayo is a 25 y.o.  at 26w1d.  Doing well.  Denies contractions, LOF, or VB.  Reports good FM.    /68   Wt 82.2 kg (181 lb 3.2 oz)   LMP 2019 (Approximate)   BMI 26.76 kg/m²   SVE: Not done  Fundal Height (cm): 24 cm  Fetal Heart Rate: 150     Problems (from 20 to present)     Problem Noted Resolved    Rh negative status during pregnancy in second trimester 2020 by Tyler Lock MD No    Priority:  High            A/P: Nirmala Tamayo is a 25 y.o.  at 26w1d.  - RTC in 1 weeks  - Reviewed COVID-19 visitation policy  - Reviewed COVID-19 precautions     Diagnosis Plan   1. Fetal size inconsistent with dates  US Ob Follow Up Transabdominal Approach    US Ob Limited 1 + Fetuses will plan for ultrasound   2. Rh negative status during pregnancy in second trimester   antibody screen negative   3. History of premature delivery  US Ob Transvaginal delivery was primarily for hypertension but she patient is having some cramping we will plan for follow-up transvaginal scan consistent MFM recommendation in light of this   4. 26 weeks gestation of pregnancy       Tyler Lock MD  2020  20:24

## 2020-06-22 DIAGNOSIS — O26.849 FETAL SIZE INCONSISTENT WITH DATES: ICD-10-CM

## 2020-07-01 ENCOUNTER — ROUTINE PRENATAL (OUTPATIENT)
Dept: OBSTETRICS AND GYNECOLOGY | Facility: CLINIC | Age: 25
End: 2020-07-01

## 2020-07-01 VITALS — SYSTOLIC BLOOD PRESSURE: 116 MMHG | WEIGHT: 181.4 LBS | BODY MASS INDEX: 26.79 KG/M2 | DIASTOLIC BLOOD PRESSURE: 64 MMHG

## 2020-07-01 DIAGNOSIS — Z67.91 RH NEGATIVE STATUS DURING PREGNANCY IN SECOND TRIMESTER: ICD-10-CM

## 2020-07-01 DIAGNOSIS — O26.892 RH NEGATIVE STATUS DURING PREGNANCY IN SECOND TRIMESTER: ICD-10-CM

## 2020-07-01 DIAGNOSIS — O36.63X0 MACROSOMIA OF FETUS AFFECTING MANAGEMENT OF MOTHER IN THIRD TRIMESTER, SINGLE OR UNSPECIFIED FETUS: ICD-10-CM

## 2020-07-01 DIAGNOSIS — Z3A.28 28 WEEKS GESTATION OF PREGNANCY: Primary | ICD-10-CM

## 2020-07-01 PROCEDURE — 0502F SUBSEQUENT PRENATAL CARE: CPT | Performed by: OBSTETRICS & GYNECOLOGY

## 2020-07-03 PROBLEM — Z3A.28 28 WEEKS GESTATION OF PREGNANCY: Status: ACTIVE | Noted: 2020-07-03

## 2020-07-03 PROBLEM — O36.63X0 MACROSOMIA OF FETUS AFFECTING MANAGEMENT OF MOTHER IN THIRD TRIMESTER: Status: ACTIVE | Noted: 2020-07-03

## 2020-07-03 NOTE — PROGRESS NOTES
CC: Prenatal visit    Nirmala Tamayo is a 25 y.o.  at 28w3d.  Doing well.  Denies contractions, LOF, or VB.  Reports good FM.    /64   Wt 82.3 kg (181 lb 6.4 oz)   LMP 2019 (Approximate)   BMI 26.79 kg/m²   SVE: Not done           Problems (from 20 to present)     Problem Noted Resolved    Macrosomia of fetus affecting management of mother in third trimester 7/3/2020 by Tyler Lock MD No    Priority:  High      Rh negative status during pregnancy in second trimester 2020 by Tyler Lock MD No    Priority:  High            A/P: Nirmala Tamayo is a 25 y.o.  at 28w3d.  - RTC in 1 weeks  - Reviewed COVID-19 visitation policy  - Reviewed COVID-19 precautions     Diagnosis Plan   1. 28 weeks gestation of pregnancy     2. Rh negative status during pregnancy in second trimester   she needs RhoGam for Rh- but not available at the clinic today we will plan to administer 1 see back next week   3. Macrosomia of fetus affecting management of mother in third trimester, single or unspecified fetus   patient's Glucola been low 130s had not wanted to get 3hour but in light of developing macrosomia agrees to get this will plan to do this early next week     Tyler Lock MD  7/3/2020  13:39

## 2020-07-08 ENCOUNTER — ROUTINE PRENATAL (OUTPATIENT)
Dept: OBSTETRICS AND GYNECOLOGY | Facility: CLINIC | Age: 25
End: 2020-07-08

## 2020-07-08 ENCOUNTER — LAB (OUTPATIENT)
Dept: LAB | Facility: HOSPITAL | Age: 25
End: 2020-07-08

## 2020-07-08 VITALS — BODY MASS INDEX: 26.97 KG/M2 | WEIGHT: 182.6 LBS | SYSTOLIC BLOOD PRESSURE: 108 MMHG | DIASTOLIC BLOOD PRESSURE: 70 MMHG

## 2020-07-08 DIAGNOSIS — O36.63X0 MACROSOMIA OF FETUS AFFECTING MANAGEMENT OF MOTHER IN THIRD TRIMESTER, SINGLE OR UNSPECIFIED FETUS: ICD-10-CM

## 2020-07-08 DIAGNOSIS — Z3A.29 29 WEEKS GESTATION OF PREGNANCY: ICD-10-CM

## 2020-07-08 DIAGNOSIS — O26.892 RH NEGATIVE STATUS DURING PREGNANCY IN SECOND TRIMESTER: Primary | ICD-10-CM

## 2020-07-08 DIAGNOSIS — R73.09 ABNORMAL GLUCOSE: Primary | ICD-10-CM

## 2020-07-08 DIAGNOSIS — Z67.91 RH NEGATIVE STATUS DURING PREGNANCY IN SECOND TRIMESTER: Primary | ICD-10-CM

## 2020-07-08 LAB
GLUCOSE 1H P 100 G GLC PO SERPL-MCNC: 137 MG/DL (ref 74–180)
GLUCOSE 2H P 100 G GLC PO SERPL-MCNC: 95 MG/DL (ref 74–155)
GLUCOSE 3H P 100 G GLC PO SERPL-MCNC: 99 MG/DL (ref 74–140)
GLUCOSE P FAST SERPL-MCNC: 76 MG/DL (ref 65–99)

## 2020-07-08 PROCEDURE — 0502F SUBSEQUENT PRENATAL CARE: CPT | Performed by: OBSTETRICS & GYNECOLOGY

## 2020-07-08 PROCEDURE — 96372 THER/PROPH/DIAG INJ SC/IM: CPT | Performed by: OBSTETRICS & GYNECOLOGY

## 2020-07-08 PROCEDURE — 82952 GTT-ADDED SAMPLES: CPT

## 2020-07-08 PROCEDURE — 36415 COLL VENOUS BLD VENIPUNCTURE: CPT

## 2020-07-08 PROCEDURE — 82951 GLUCOSE TOLERANCE TEST (GTT): CPT

## 2020-07-08 NOTE — PROGRESS NOTES
CC: Prenatal visit    Nimrala Tamayo is a 25 y.o.  at 29w1d.  Doing well.  Denies contractions, LOF, or VB.  Reports good FM.    /70   Wt 82.8 kg (182 lb 9.6 oz)   LMP 2019 (Approximate)   BMI 26.97 kg/m²   SVE: Not done  Fundal Height (cm): 29 cm  Fetal Heart Rate: 160     Problems (from 20 to present)     Problem Noted Resolved    Macrosomia of fetus affecting management of mother in third trimester 7/3/2020 by Tyler Lock MD No    Priority:  High      Rh negative status during pregnancy in second trimester 2020 by Tyler Lock MD No    Priority:  High            A/P: Nirmala Tamayo is a 25 y.o.  at 29w1d.  - RTC in 1 weeks  - Reviewed COVID-19 visitation policy  - Reviewed COVID-19 precautions     Diagnosis Plan   1. Rh negative status during pregnancy in second trimester  Rhogam Immune Globulin Immunization RhoGam obtained from pharmacy at hospital and given to patient   2. Macrosomia of fetus affecting management of mother in third trimester, single or unspecified fetus  US Ob Follow Up Transabdominal Approach 3-hour GTT is negative today   3. 29 weeks gestation of pregnancy       Tyler Lock MD  2020  17:45

## 2020-07-09 DIAGNOSIS — Z87.51 HISTORY OF PREMATURE DELIVERY: ICD-10-CM

## 2020-07-10 RX ORDER — AMOXICILLIN 500 MG/1
500 TABLET, FILM COATED ORAL 2 TIMES DAILY
Qty: 20 TABLET | Refills: 1 | Status: SHIPPED | OUTPATIENT
Start: 2020-07-10 | End: 2020-08-12

## 2020-07-13 ENCOUNTER — TELEPHONE (OUTPATIENT)
Dept: OBSTETRICS AND GYNECOLOGY | Facility: CLINIC | Age: 25
End: 2020-07-13

## 2020-07-13 NOTE — TELEPHONE ENCOUNTER
----- Message from Yuliana Davila sent at 7/13/2020 10:40 AM CDT -----  Contact: [phone]  Patient is pregnant. She states she has had a slight headache over the weekend. She wanted to call and let him know. She wanted to know if he wanted her to keep a check on her BP. I ask her what her BP was. She said she didn't know but she was on her way home and she will check it and call back. But she wanted me to go ahead and send the message for a heads up.

## 2020-07-13 NOTE — TELEPHONE ENCOUNTER
I returned the patients call. She had a headache over the weekend and wanted to know if she should start keeping a log of her blood pressure. I told her that would be great. I told her to call the office if it was high and if it was after hours to go to the ER. Patient verbalized understanding.

## 2020-07-22 ENCOUNTER — ROUTINE PRENATAL (OUTPATIENT)
Dept: OBSTETRICS AND GYNECOLOGY | Facility: CLINIC | Age: 25
End: 2020-07-22

## 2020-07-22 VITALS — SYSTOLIC BLOOD PRESSURE: 106 MMHG | DIASTOLIC BLOOD PRESSURE: 78 MMHG | WEIGHT: 190 LBS | BODY MASS INDEX: 28.06 KG/M2

## 2020-07-22 DIAGNOSIS — Z67.91 RH NEGATIVE STATUS DURING PREGNANCY IN SECOND TRIMESTER: ICD-10-CM

## 2020-07-22 DIAGNOSIS — O36.63X0 MACROSOMIA OF FETUS AFFECTING MANAGEMENT OF MOTHER IN THIRD TRIMESTER, SINGLE OR UNSPECIFIED FETUS: ICD-10-CM

## 2020-07-22 DIAGNOSIS — O26.892 RH NEGATIVE STATUS DURING PREGNANCY IN SECOND TRIMESTER: ICD-10-CM

## 2020-07-22 DIAGNOSIS — Z3A.31 31 WEEKS GESTATION OF PREGNANCY: Primary | ICD-10-CM

## 2020-07-22 PROCEDURE — 0502F SUBSEQUENT PRENATAL CARE: CPT | Performed by: OBSTETRICS & GYNECOLOGY

## 2020-07-24 ENCOUNTER — TELEPHONE (OUTPATIENT)
Dept: OBSTETRICS AND GYNECOLOGY | Facility: CLINIC | Age: 25
End: 2020-07-24

## 2020-07-24 NOTE — TELEPHONE ENCOUNTER
PT HAS SENT MESSAGES AND SENT ME PERSONAL MESSAGES THIS MORNING REGARDING SOME PAINS THAT SHE IS HAVING.  I TOLD HER THAT HARMONY IS STILL SEEING PATIENTS AND WILL CALL HER WHEN SHE GETS A CHANCE.  PLEASE RETURN HER CALL.    RETURNED PT CALL.  She is having BH ctx.  Discussed comfort measures.  PTL precautions.  Pt verbalized understanding.

## 2020-07-26 PROBLEM — Z3A.31 31 WEEKS GESTATION OF PREGNANCY: Status: ACTIVE | Noted: 2020-07-26

## 2020-07-26 NOTE — PROGRESS NOTES
CC: Prenatal visit    Nirmala Tamayo is a 25 y.o.  at 31w5d.  Doing well.  Denies contractions, LOF, or VB.  Reports good FM.    /78   Wt 86.2 kg (190 lb)   LMP 2019 (Approximate)   BMI 28.06 kg/m²   SVE: Not done  Fundal Height (cm): 32 cm  Fetal Heart Rate: 148     Problems (from 20 to present)     Problem Noted Resolved    Macrosomia of fetus affecting management of mother in third trimester 7/3/2020 by Tyler Lock MD No    Priority:  High      Rh negative status during pregnancy in second trimester 2020 by Tyler Lock MD No    Priority:  High            A/P: Nirmala Tamayo is a 25 y.o.  at 31w5d.  - RTC in 2 weeks  - Reviewed COVID-19 visitation policy  - Reviewed COVID-19 precautions     Diagnosis Plan   1. 31 weeks gestation of pregnancy     2. Macrosomia of fetus affecting management of mother in third trimester, single or unspecified fetus   ultrasound reviewed today with patient.  Estimated fetal weight 79th percentile but abdominal circumference 90 Sacrament.  3-hour GTT had been within normal limits.  Still advised avoiding concentrated sugars   3. Rh negative status during pregnancy in second trimester       Tyler Lock MD  2020  14:40

## 2020-07-27 ENCOUNTER — LAB (OUTPATIENT)
Dept: LAB | Facility: HOSPITAL | Age: 25
End: 2020-07-27

## 2020-07-27 ENCOUNTER — TELEPHONE (OUTPATIENT)
Dept: OBSTETRICS AND GYNECOLOGY | Facility: CLINIC | Age: 25
End: 2020-07-27

## 2020-07-27 DIAGNOSIS — E61.1 LOW IRON: ICD-10-CM

## 2020-07-27 DIAGNOSIS — E61.1 LOW IRON: Primary | ICD-10-CM

## 2020-07-27 LAB
IRON 24H UR-MRATE: 53 MCG/DL (ref 37–145)
IRON SATN MFR SERPL: 10 % (ref 20–50)
TIBC SERPL-MCNC: 556 MCG/DL (ref 298–536)
TRANSFERRIN SERPL-MCNC: 373 MG/DL (ref 200–360)

## 2020-07-27 PROCEDURE — 36415 COLL VENOUS BLD VENIPUNCTURE: CPT

## 2020-07-27 PROCEDURE — 84466 ASSAY OF TRANSFERRIN: CPT

## 2020-07-27 PROCEDURE — 83540 ASSAY OF IRON: CPT

## 2020-07-27 NOTE — TELEPHONE ENCOUNTER
Patient called and said she thinks her iron is low and wanted to do lab work.  I told her that I would put the orders in for her iron to be checked. Patient verbalized understanding.

## 2020-07-27 NOTE — TELEPHONE ENCOUNTER
Patient says she is seeing spots in vision and thinks her iron is low and wants to get a callback

## 2020-07-28 ENCOUNTER — TELEPHONE (OUTPATIENT)
Dept: OBSTETRICS AND GYNECOLOGY | Facility: CLINIC | Age: 25
End: 2020-07-28

## 2020-07-28 RX ORDER — FERROUS SULFATE 325(65) MG
325 TABLET ORAL
Qty: 30 TABLET | Refills: 12 | Status: SHIPPED | OUTPATIENT
Start: 2020-07-28 | End: 2020-08-27

## 2020-07-31 ENCOUNTER — HOSPITAL ENCOUNTER (OUTPATIENT)
Facility: HOSPITAL | Age: 25
Discharge: HOME | End: 2020-07-31
Attending: OBSTETRICS & GYNECOLOGY | Admitting: OBSTETRICS & GYNECOLOGY

## 2020-07-31 ENCOUNTER — TELEPHONE (OUTPATIENT)
Dept: OBSTETRICS AND GYNECOLOGY | Facility: CLINIC | Age: 25
End: 2020-07-31

## 2020-07-31 VITALS
RESPIRATION RATE: 18 BRPM | BODY MASS INDEX: 28.14 KG/M2 | TEMPERATURE: 98.2 F | OXYGEN SATURATION: 97 % | DIASTOLIC BLOOD PRESSURE: 78 MMHG | SYSTOLIC BLOOD PRESSURE: 125 MMHG | WEIGHT: 190 LBS | HEIGHT: 69 IN | HEART RATE: 90 BPM

## 2020-07-31 DIAGNOSIS — O26.849 FETAL SIZE INCONSISTENT WITH DATES: ICD-10-CM

## 2020-07-31 LAB
ALBUMIN SERPL-MCNC: 3.4 G/DL (ref 3.5–5.2)
ALBUMIN/GLOB SERPL: 1.2 G/DL
ALP SERPL-CCNC: 97 U/L (ref 39–117)
ALT SERPL W P-5'-P-CCNC: 9 U/L (ref 1–33)
AMYLASE SERPL-CCNC: 100 U/L (ref 28–100)
ANION GAP SERPL CALCULATED.3IONS-SCNC: 13 MMOL/L (ref 5–15)
AST SERPL-CCNC: 14 U/L (ref 1–32)
BASOPHILS # BLD AUTO: 0.02 10*3/MM3 (ref 0–0.2)
BASOPHILS NFR BLD AUTO: 0.2 % (ref 0–1.5)
BILIRUB SERPL-MCNC: <0.2 MG/DL (ref 0–1.2)
BILIRUB UR QL STRIP: NEGATIVE
BUN SERPL-MCNC: 7 MG/DL (ref 6–20)
BUN/CREAT SERPL: 14.3 (ref 7–25)
CALCIUM SPEC-SCNC: 8.6 MG/DL (ref 8.6–10.5)
CHLORIDE SERPL-SCNC: 103 MMOL/L (ref 98–107)
CLARITY UR: CLEAR
CO2 SERPL-SCNC: 22 MMOL/L (ref 22–29)
COLOR UR: YELLOW
CREAT SERPL-MCNC: 0.49 MG/DL (ref 0.57–1)
DEPRECATED RDW RBC AUTO: 41.4 FL (ref 37–54)
EOSINOPHIL # BLD AUTO: 0.06 10*3/MM3 (ref 0–0.4)
EOSINOPHIL NFR BLD AUTO: 0.7 % (ref 0.3–6.2)
ERYTHROCYTE [DISTWIDTH] IN BLOOD BY AUTOMATED COUNT: 13.1 % (ref 12.3–15.4)
GFR SERPL CREATININE-BSD FRML MDRD: >150 ML/MIN/1.73
GLOBULIN UR ELPH-MCNC: 2.8 GM/DL
GLUCOSE SERPL-MCNC: 84 MG/DL (ref 65–99)
GLUCOSE UR STRIP-MCNC: NEGATIVE MG/DL
HCT VFR BLD AUTO: 35.3 % (ref 34–46.6)
HGB BLD-MCNC: 11.9 G/DL (ref 12–15.9)
HGB UR QL STRIP.AUTO: NEGATIVE
IMM GRANULOCYTES # BLD AUTO: 0.06 10*3/MM3 (ref 0–0.05)
IMM GRANULOCYTES NFR BLD AUTO: 0.7 % (ref 0–0.5)
KETONES UR QL STRIP: NEGATIVE
LEUKOCYTE ESTERASE UR QL STRIP.AUTO: NEGATIVE
LIPASE SERPL-CCNC: 38 U/L (ref 13–60)
LYMPHOCYTES # BLD AUTO: 1.7 10*3/MM3 (ref 0.7–3.1)
LYMPHOCYTES NFR BLD AUTO: 19.9 % (ref 19.6–45.3)
MCH RBC QN AUTO: 29.3 PG (ref 26.6–33)
MCHC RBC AUTO-ENTMCNC: 33.7 G/DL (ref 31.5–35.7)
MCV RBC AUTO: 86.9 FL (ref 79–97)
MONOCYTES # BLD AUTO: 0.78 10*3/MM3 (ref 0.1–0.9)
MONOCYTES NFR BLD AUTO: 9.1 % (ref 5–12)
NEUTROPHILS NFR BLD AUTO: 5.91 10*3/MM3 (ref 1.7–7)
NEUTROPHILS NFR BLD AUTO: 69.4 % (ref 42.7–76)
NITRITE UR QL STRIP: NEGATIVE
NRBC BLD AUTO-RTO: 0 /100 WBC (ref 0–0.2)
PH UR STRIP.AUTO: 7 [PH] (ref 5–9)
PLATELET # BLD AUTO: 142 10*3/MM3 (ref 140–450)
PMV BLD AUTO: 10.9 FL (ref 6–12)
POTASSIUM SERPL-SCNC: 3.8 MMOL/L (ref 3.5–5.2)
PROT SERPL-MCNC: 6.2 G/DL (ref 6–8.5)
PROT UR QL STRIP: NEGATIVE
RBC # BLD AUTO: 4.06 10*6/MM3 (ref 3.77–5.28)
SODIUM SERPL-SCNC: 138 MMOL/L (ref 136–145)
SP GR UR STRIP: 1.01 (ref 1–1.03)
UROBILINOGEN UR QL STRIP: NORMAL
WBC # BLD AUTO: 8.53 10*3/MM3 (ref 3.4–10.8)

## 2020-07-31 PROCEDURE — 81003 URINALYSIS AUTO W/O SCOPE: CPT | Performed by: OBSTETRICS & GYNECOLOGY

## 2020-07-31 PROCEDURE — 99214 OFFICE O/P EST MOD 30 MIN: CPT | Performed by: OBSTETRICS & GYNECOLOGY

## 2020-07-31 PROCEDURE — 59025 FETAL NON-STRESS TEST: CPT

## 2020-07-31 PROCEDURE — 59025 FETAL NON-STRESS TEST: CPT | Performed by: OBSTETRICS & GYNECOLOGY

## 2020-07-31 PROCEDURE — 85025 COMPLETE CBC W/AUTO DIFF WBC: CPT | Performed by: OBSTETRICS & GYNECOLOGY

## 2020-07-31 PROCEDURE — 80053 COMPREHEN METABOLIC PANEL: CPT | Performed by: OBSTETRICS & GYNECOLOGY

## 2020-07-31 PROCEDURE — 36415 COLL VENOUS BLD VENIPUNCTURE: CPT | Performed by: OBSTETRICS & GYNECOLOGY

## 2020-07-31 PROCEDURE — 82150 ASSAY OF AMYLASE: CPT | Performed by: OBSTETRICS & GYNECOLOGY

## 2020-07-31 PROCEDURE — 83690 ASSAY OF LIPASE: CPT | Performed by: OBSTETRICS & GYNECOLOGY

## 2020-07-31 PROCEDURE — G0463 HOSPITAL OUTPT CLINIC VISIT: HCPCS

## 2020-07-31 RX ORDER — PANTOPRAZOLE SODIUM 40 MG/1
40 TABLET, DELAYED RELEASE ORAL ONCE
Status: COMPLETED | OUTPATIENT
Start: 2020-07-31 | End: 2020-07-31

## 2020-07-31 RX ADMIN — PANTOPRAZOLE SODIUM 40 MG: 40 TABLET, DELAYED RELEASE ORAL at 14:21

## 2020-07-31 NOTE — TELEPHONE ENCOUNTER
Called patient told to come in concerned she may have preeclampsia because of upper abdominal pain she agreed to do this

## 2020-07-31 NOTE — TELEPHONE ENCOUNTER
----- Message from Amber Michel MA sent at 7/31/2020 12:02 PM CDT -----  Regarding: FW: Complaint  Contact: [phone]      ----- Message -----  From: Nirmala Tamayo  Sent: 7/31/2020  10:23 AM CDT  To: [facility]  Subject: RE: Complaint                                    Yes I tried tums and it doesn’t help. I just hope it’s heartburn and nothing else more concerning. It’s some upper abdominal pain and sometimes will wrap around to my back.  ----- Message -----  From: MICHAELA MORALES  Sent: 7/31/2020  9:37 AM EDT  To: Nirmala Tamayo  Subject: RE: Complaint  Nirmala,                Have you tried to take any medication to see if that would help?       Amber      ----- Message -----     From: Nirmala Tamayo     Sent: 7/30/2020 11:49 PM CDT       To: Tyler Lock MD  Subject: Complaint    Hello, I hate to have to be on here again but I’ve been having some upper abdominal pain. I haven’t experienced this with pregnancy before. Sometimes it wraps around to my back. I’m not exactly sure if it’s really bad heartburn or something else. It’s been doing this since about 4pm this afternoon. I figured I would ask on here before I went to the hospital for something that is really nothing. I hope. I took my BP and the bottom number was 88 but that was with a automatic machine.     Thanks.

## 2020-07-31 NOTE — TELEPHONE ENCOUNTER
Patient having upper abdominal pain told to come to emergency room for evaluation as she is pregnant concerned may be having preeclamptic symptoms

## 2020-08-03 ENCOUNTER — ROUTINE PRENATAL (OUTPATIENT)
Dept: OBSTETRICS AND GYNECOLOGY | Facility: CLINIC | Age: 25
End: 2020-08-03

## 2020-08-03 VITALS — WEIGHT: 192.4 LBS | SYSTOLIC BLOOD PRESSURE: 112 MMHG | DIASTOLIC BLOOD PRESSURE: 78 MMHG | BODY MASS INDEX: 28.41 KG/M2

## 2020-08-03 DIAGNOSIS — O09.213 CURRENT PREGNANCY WITH HISTORY OF PRE-TERM LABOR IN THIRD TRIMESTER: Primary | ICD-10-CM

## 2020-08-03 DIAGNOSIS — Z3A.32 32 WEEKS GESTATION OF PREGNANCY: Primary | ICD-10-CM

## 2020-08-03 DIAGNOSIS — O26.892 RH NEGATIVE STATUS DURING PREGNANCY IN SECOND TRIMESTER: ICD-10-CM

## 2020-08-03 DIAGNOSIS — Z67.91 RH NEGATIVE STATUS DURING PREGNANCY IN SECOND TRIMESTER: ICD-10-CM

## 2020-08-03 DIAGNOSIS — O36.63X0 MACROSOMIA OF FETUS AFFECTING MANAGEMENT OF MOTHER IN THIRD TRIMESTER, SINGLE OR UNSPECIFIED FETUS: ICD-10-CM

## 2020-08-03 PROCEDURE — 0502F SUBSEQUENT PRENATAL CARE: CPT | Performed by: OBSTETRICS & GYNECOLOGY

## 2020-08-04 PROBLEM — Z3A.32 32 WEEKS GESTATION OF PREGNANCY: Status: ACTIVE | Noted: 2020-08-04

## 2020-08-05 NOTE — PROGRESS NOTES
CC: Prenatal visit    Nirmala Tamayo is a 25 y.o.  at 33w0d.  Doing well.  Denies contractions, LOF, or VB.  Reports good FM.    /78   Wt 87.3 kg (192 lb 6.4 oz)   LMP 2019 (Approximate)   BMI 28.41 kg/m²   SVE: Not done  Fundal Height (cm): 33 cm  Fetal Heart Rate: 145     Problems (from 20 to present)     Problem Noted Resolved    Macrosomia of fetus affecting management of mother in third trimester 7/3/2020 by Tyler Lock MD No    Priority:  High      Rh negative status during pregnancy in second trimester 2020 by Tyler Lock MD No    Priority:  High            A/P: Nirmala Tamayo is a 25 y.o.  at 33w0d.  - RTC in 1 weeks  - Reviewed COVID-19 visitation policy  - Reviewed COVID-19 precautions     Diagnosis Plan   1. 32 weeks gestation of pregnancy     2. Macrosomia of fetus affecting management of mother in third trimester, single or unspecified fetus   serial sonography for biometry   3. Rh negative status during pregnancy in second trimester     Patient had called complaining of cramping today we got transvaginal for cervical length that was reassuring at 3.38  Tyler Lock MD  2020  19:01

## 2020-08-12 ENCOUNTER — ROUTINE PRENATAL (OUTPATIENT)
Dept: OBSTETRICS AND GYNECOLOGY | Facility: CLINIC | Age: 25
End: 2020-08-12

## 2020-08-12 VITALS — DIASTOLIC BLOOD PRESSURE: 64 MMHG | WEIGHT: 193.6 LBS | BODY MASS INDEX: 28.59 KG/M2 | SYSTOLIC BLOOD PRESSURE: 102 MMHG

## 2020-08-12 DIAGNOSIS — O36.63X0 MACROSOMIA OF FETUS AFFECTING MANAGEMENT OF MOTHER IN THIRD TRIMESTER, SINGLE OR UNSPECIFIED FETUS: ICD-10-CM

## 2020-08-12 DIAGNOSIS — Z3A.34 34 WEEKS GESTATION OF PREGNANCY: Primary | ICD-10-CM

## 2020-08-12 DIAGNOSIS — O26.892 RH NEGATIVE STATUS DURING PREGNANCY IN SECOND TRIMESTER: ICD-10-CM

## 2020-08-12 DIAGNOSIS — Z67.91 RH NEGATIVE STATUS DURING PREGNANCY IN SECOND TRIMESTER: ICD-10-CM

## 2020-08-12 PROCEDURE — 0502F SUBSEQUENT PRENATAL CARE: CPT | Performed by: OBSTETRICS & GYNECOLOGY

## 2020-08-13 PROBLEM — Z3A.34 34 WEEKS GESTATION OF PREGNANCY: Status: ACTIVE | Noted: 2020-08-13

## 2020-08-14 NOTE — PROGRESS NOTES
CC: Prenatal visit    Nirmala Tamayo is a 25 y.o.  at 34w2d.  Doing well.  Denies contractions, LOF, or VB.  Reports good FM.    /64   Wt 87.8 kg (193 lb 9.6 oz)   LMP 2019 (Approximate)   BMI 28.59 kg/m²   SVE: Fingertip  Fundal Height (cm): 34 cm  Fetal Heart Rate: 150     Problems (from 20 to present)     Problem Noted Resolved    Macrosomia of fetus affecting management of mother in third trimester 7/3/2020 by Tyler Lock MD No    Priority:  High      Rh negative status during pregnancy in second trimester 2020 by Tyler Lock MD No    Priority:  High            A/P: Nirmala Tamayo is a 25 y.o.  at 34w2d.  - RTC in 1 weeks  - Reviewed COVID-19 visitation policy  - Reviewed COVID-19 precautions     Diagnosis Plan   1. 34 weeks gestation of pregnancy     2. Macrosomia of fetus affecting management of mother in third trimester, single or unspecified fetus   follow-up ultrasound counts emphasized   3. Rh negative status during pregnancy in second trimester       Tyler Lock MD  2020  21:07

## 2020-08-17 DIAGNOSIS — O26.843 UTERINE SIZE-DATE DISCREPANCY IN THIRD TRIMESTER: Primary | ICD-10-CM

## 2020-08-18 ENCOUNTER — TELEPHONE (OUTPATIENT)
Dept: OBSTETRICS AND GYNECOLOGY | Facility: CLINIC | Age: 25
End: 2020-08-18

## 2020-08-18 DIAGNOSIS — O36.63X0 MACROSOMIA OF FETUS AFFECTING MANAGEMENT OF MOTHER IN THIRD TRIMESTER, SINGLE OR UNSPECIFIED FETUS: ICD-10-CM

## 2020-08-18 DIAGNOSIS — O36.63X0 MACROSOMIA OF FETUS AFFECTING MANAGEMENT OF MOTHER IN THIRD TRIMESTER, SINGLE OR UNSPECIFIED FETUS: Primary | ICD-10-CM

## 2020-08-18 DIAGNOSIS — O26.843 UTERINE SIZE-DATE DISCREPANCY IN THIRD TRIMESTER: ICD-10-CM

## 2020-08-18 NOTE — TELEPHONE ENCOUNTER
Patient sent me a message through my chart.  I do not know how to respond to it.  I called her she said she had had over the weekend the diastolic blood pressure 90.  She says she has an appointment tomorrow with Dr. George.  I asked her to bring in her machine so we could validate the blood pressure readings.  I am also going to put in for a CMP and CBC stat when she comes in and send off for urine protein creatinine ratio

## 2020-08-19 ENCOUNTER — ROUTINE PRENATAL (OUTPATIENT)
Dept: OBSTETRICS AND GYNECOLOGY | Facility: CLINIC | Age: 25
End: 2020-08-19

## 2020-08-19 ENCOUNTER — LAB (OUTPATIENT)
Dept: LAB | Facility: HOSPITAL | Age: 25
End: 2020-08-19

## 2020-08-19 DIAGNOSIS — Z3A.35 35 WEEKS GESTATION OF PREGNANCY: ICD-10-CM

## 2020-08-19 DIAGNOSIS — O36.63X0 MACROSOMIA OF FETUS AFFECTING MANAGEMENT OF MOTHER IN THIRD TRIMESTER, SINGLE OR UNSPECIFIED FETUS: ICD-10-CM

## 2020-08-19 DIAGNOSIS — O13.3 GESTATIONAL HYPERTENSION, THIRD TRIMESTER: Primary | ICD-10-CM

## 2020-08-19 DIAGNOSIS — O26.892 RH NEGATIVE STATUS DURING PREGNANCY IN SECOND TRIMESTER: ICD-10-CM

## 2020-08-19 DIAGNOSIS — Z67.91 RH NEGATIVE STATUS DURING PREGNANCY IN SECOND TRIMESTER: ICD-10-CM

## 2020-08-19 LAB
ALBUMIN SERPL-MCNC: 3.9 G/DL (ref 3.5–5.2)
ALBUMIN/GLOB SERPL: 1.3 G/DL
ALP SERPL-CCNC: 138 U/L (ref 39–117)
ALT SERPL W P-5'-P-CCNC: 10 U/L (ref 1–33)
ANION GAP SERPL CALCULATED.3IONS-SCNC: 13 MMOL/L (ref 5–15)
AST SERPL-CCNC: 17 U/L (ref 1–32)
BILIRUB SERPL-MCNC: 0.2 MG/DL (ref 0–1.2)
BUN SERPL-MCNC: 7 MG/DL (ref 6–20)
BUN/CREAT SERPL: 12.1 (ref 7–25)
CALCIUM SPEC-SCNC: 9 MG/DL (ref 8.6–10.5)
CHLORIDE SERPL-SCNC: 100 MMOL/L (ref 98–107)
CO2 SERPL-SCNC: 23 MMOL/L (ref 22–29)
CREAT SERPL-MCNC: 0.58 MG/DL (ref 0.57–1)
CREAT UR-MCNC: 118.3 MG/DL
DEPRECATED RDW RBC AUTO: 43.4 FL (ref 37–54)
ERYTHROCYTE [DISTWIDTH] IN BLOOD BY AUTOMATED COUNT: 13.9 % (ref 12.3–15.4)
GFR SERPL CREATININE-BSD FRML MDRD: 127 ML/MIN/1.73
GLOBULIN UR ELPH-MCNC: 2.9 GM/DL
GLUCOSE SERPL-MCNC: 68 MG/DL (ref 65–99)
HCT VFR BLD AUTO: 39.8 % (ref 34–46.6)
HGB BLD-MCNC: 13.6 G/DL (ref 12–15.9)
MCH RBC QN AUTO: 29.8 PG (ref 26.6–33)
MCHC RBC AUTO-ENTMCNC: 34.2 G/DL (ref 31.5–35.7)
MCV RBC AUTO: 87.3 FL (ref 79–97)
PLATELET # BLD AUTO: 148 10*3/MM3 (ref 140–450)
PMV BLD AUTO: 10.7 FL (ref 6–12)
POTASSIUM SERPL-SCNC: 3.6 MMOL/L (ref 3.5–5.2)
PROT SERPL-MCNC: 6.8 G/DL (ref 6–8.5)
PROT UR-MCNC: 13 MG/DL
PROT/CREAT UR: 109.9 MG/G CREA (ref 0–200)
RBC # BLD AUTO: 4.56 10*6/MM3 (ref 3.77–5.28)
SODIUM SERPL-SCNC: 136 MMOL/L (ref 136–145)
WBC # BLD AUTO: 11.32 10*3/MM3 (ref 3.4–10.8)

## 2020-08-19 PROCEDURE — 85027 COMPLETE CBC AUTOMATED: CPT

## 2020-08-19 PROCEDURE — 82570 ASSAY OF URINE CREATININE: CPT | Performed by: OBSTETRICS & GYNECOLOGY

## 2020-08-19 PROCEDURE — 80053 COMPREHEN METABOLIC PANEL: CPT

## 2020-08-19 PROCEDURE — 36415 COLL VENOUS BLD VENIPUNCTURE: CPT

## 2020-08-19 PROCEDURE — 0502F SUBSEQUENT PRENATAL CARE: CPT | Performed by: OBSTETRICS & GYNECOLOGY

## 2020-08-19 PROCEDURE — 84156 ASSAY OF PROTEIN URINE: CPT | Performed by: OBSTETRICS & GYNECOLOGY

## 2020-08-20 ENCOUNTER — TELEPHONE (OUTPATIENT)
Dept: OBSTETRICS AND GYNECOLOGY | Facility: CLINIC | Age: 25
End: 2020-08-20

## 2020-08-20 VITALS — BODY MASS INDEX: 28.65 KG/M2 | WEIGHT: 194 LBS | DIASTOLIC BLOOD PRESSURE: 72 MMHG | SYSTOLIC BLOOD PRESSURE: 112 MMHG

## 2020-08-20 PROBLEM — Z3A.35 35 WEEKS GESTATION OF PREGNANCY: Status: ACTIVE | Noted: 2020-08-20

## 2020-08-20 PROBLEM — O13.3 GESTATIONAL HYPERTENSION, THIRD TRIMESTER: Status: ACTIVE | Noted: 2020-08-20

## 2020-08-20 NOTE — TELEPHONE ENCOUNTER
Reviewed laboratory studies with patient she says her blood pressures are doing good at home no headaches visual changes epigastric

## 2020-08-20 NOTE — PROGRESS NOTES
CC: Prenatal visit    Nirmala Tamayo is a 25 y.o.  at 35w2d.  Doing well.  Denies contractions, LOF, or VB.  Reports good FM.    /72   Wt 88 kg (194 lb)   LMP 2019 (Approximate)   BMI 28.65 kg/m²   SVE: Not done  Fundal Height (cm): 36 cm  Fetal Heart Rate: 158     Problems (from 20 to present)     Problem Noted Resolved    Macrosomia of fetus affecting management of mother in third trimester 7/3/2020 by Tyler Lock MD No    Priority:  High      Rh negative status during pregnancy in second trimester 2020 by Tylre Lock MD No    Priority:  High            A/P: Nirmala Tamayo is a 25 y.o.  at 35w2d.     - Reviewed COVID-19 visitation policy  - Reviewed COVID-19 precautions     Diagnosis Plan   1. Gestational hypertension, third trimester   patient reports elevated blood pressures times a couple at home.  No headaches visual changes epigastric pain.  We checked her pressure cuff against ours and it did seem to correlate fairly well so even though we do not have clinic documented blood pressures I am going to go ahead and diagnosed gestational hypertension check labs and urine protein creatinine ratio.  Report to the hospital systolic blood pressure over 155 diastolic blood pressure over 105 headaches visual changes or epigastric pain    On basis of blood pressures reported by patient.  Blood pressure monitor checked against ours   2. Macrosomia of fetus affecting management of mother in third trimester, single or unspecified fetus     3. Rh negative status during pregnancy in second trimester     4. 35 weeks gestation of pregnancy       Tyler Lock MD  2020  16:31

## 2020-08-21 ENCOUNTER — HOSPITAL ENCOUNTER (OUTPATIENT)
Facility: HOSPITAL | Age: 25
Discharge: HOME | End: 2020-08-21
Attending: OBSTETRICS & GYNECOLOGY | Admitting: OBSTETRICS & GYNECOLOGY

## 2020-08-21 ENCOUNTER — ROUTINE PRENATAL (OUTPATIENT)
Dept: OBSTETRICS AND GYNECOLOGY | Facility: CLINIC | Age: 25
End: 2020-08-21

## 2020-08-21 VITALS
BODY MASS INDEX: 28.73 KG/M2 | DIASTOLIC BLOOD PRESSURE: 84 MMHG | TEMPERATURE: 98.8 F | HEART RATE: 104 BPM | SYSTOLIC BLOOD PRESSURE: 130 MMHG | HEIGHT: 69 IN | OXYGEN SATURATION: 100 % | WEIGHT: 194 LBS | RESPIRATION RATE: 18 BRPM

## 2020-08-21 VITALS — DIASTOLIC BLOOD PRESSURE: 70 MMHG | SYSTOLIC BLOOD PRESSURE: 102 MMHG | WEIGHT: 195.4 LBS | BODY MASS INDEX: 28.86 KG/M2

## 2020-08-21 DIAGNOSIS — Z36.85 ANTENATAL SCREENING FOR STREPTOCOCCUS B: Primary | ICD-10-CM

## 2020-08-21 DIAGNOSIS — O13.3 GESTATIONAL HYPERTENSION, THIRD TRIMESTER: ICD-10-CM

## 2020-08-21 DIAGNOSIS — O36.63X0 MACROSOMIA OF FETUS AFFECTING MANAGEMENT OF MOTHER IN THIRD TRIMESTER, SINGLE OR UNSPECIFIED FETUS: ICD-10-CM

## 2020-08-21 DIAGNOSIS — Z67.91 RH NEGATIVE STATUS DURING PREGNANCY IN SECOND TRIMESTER: ICD-10-CM

## 2020-08-21 DIAGNOSIS — O26.892 RH NEGATIVE STATUS DURING PREGNANCY IN SECOND TRIMESTER: ICD-10-CM

## 2020-08-21 LAB
BACTERIA UR QL AUTO: ABNORMAL /HPF
BILIRUB UR QL STRIP: NEGATIVE
CANDIDA ALBICANS: NEGATIVE
CLARITY UR: CLEAR
COLOR UR: YELLOW
GARDNERELLA VAGINALIS: NEGATIVE
GLUCOSE UR STRIP-MCNC: NEGATIVE MG/DL
HGB UR QL STRIP.AUTO: NEGATIVE
HYALINE CASTS UR QL AUTO: ABNORMAL /LPF
KETONES UR QL STRIP: ABNORMAL
LEUKOCYTE ESTERASE UR QL STRIP.AUTO: ABNORMAL
NITRITE UR QL STRIP: NEGATIVE
PH UR STRIP.AUTO: 6 [PH] (ref 5–9)
PROT UR QL STRIP: NEGATIVE
RBC # UR: ABNORMAL /HPF
REF LAB TEST METHOD: ABNORMAL
SP GR UR STRIP: 1.01 (ref 1–1.03)
SQUAMOUS #/AREA URNS HPF: ABNORMAL /HPF
T VAGINALIS DNA VAG QL PROBE+SIG AMP: NEGATIVE
UROBILINOGEN UR QL STRIP: ABNORMAL
WBC UR QL AUTO: ABNORMAL /HPF

## 2020-08-21 PROCEDURE — 59025 FETAL NON-STRESS TEST: CPT | Performed by: OBSTETRICS & GYNECOLOGY

## 2020-08-21 PROCEDURE — 87660 TRICHOMONAS VAGIN DIR PROBE: CPT | Performed by: OBSTETRICS & GYNECOLOGY

## 2020-08-21 PROCEDURE — 87653 STREP B DNA AMP PROBE: CPT | Performed by: OBSTETRICS & GYNECOLOGY

## 2020-08-21 PROCEDURE — 81001 URINALYSIS AUTO W/SCOPE: CPT | Performed by: OBSTETRICS & GYNECOLOGY

## 2020-08-21 PROCEDURE — 87510 GARDNER VAG DNA DIR PROBE: CPT | Performed by: OBSTETRICS & GYNECOLOGY

## 2020-08-21 PROCEDURE — G0463 HOSPITAL OUTPT CLINIC VISIT: HCPCS

## 2020-08-21 PROCEDURE — 59025 FETAL NON-STRESS TEST: CPT

## 2020-08-21 PROCEDURE — 87480 CANDIDA DNA DIR PROBE: CPT | Performed by: OBSTETRICS & GYNECOLOGY

## 2020-08-21 PROCEDURE — 0502F SUBSEQUENT PRENATAL CARE: CPT | Performed by: OBSTETRICS & GYNECOLOGY

## 2020-08-22 LAB — GROUP B STREP, DNA: NEGATIVE

## 2020-08-22 NOTE — NON STRESS TEST
Nirmala Tamayo, a  at 35w3d with an ELISE of 2020, by Last Menstrual Period, was seen at [facility] for a nonstress test.    Chief Complaint   Patient presents with   • Contractions     pt reports u/c's since around 1500 and getting stronger; pt denies any vaginal bleeding or leaking of  fluids; pt reports positive fetal movment       Patient Active Problem List   Diagnosis   • Sore throat   • Anxiety   • Fever   • Low back pain with radiation   • Lumbar disc disorder   • Irritable bowel syndrome with constipation   • Epigastric pain   • Acute upper respiratory infection   • Left otitis media   • Acne vulgaris   • Encounter for surveillance of contraceptive pills   • Acute bronchiolitis   • Cough   • Threatened    • 9 weeks gestation of pregnancy   • Urinary frequency   • 15 weeks gestation of pregnancy   • Rh negative status during pregnancy in second trimester   • Headache   • 18 weeks gestation of pregnancy   • Encounter for  screening for malformation using ultrasound   • Multigravida   •  uterine contractions, antepartum   • 25 weeks gestation of pregnancy   • Fetal size inconsistent with dates   • History of premature delivery   • 26 weeks gestation of pregnancy   • 28 weeks gestation of pregnancy   • Macrosomia of fetus affecting management of mother in third trimester   • 29 weeks gestation of pregnancy   • 31 weeks gestation of pregnancy   • 32 weeks gestation of pregnancy   • 34 weeks gestation of pregnancy   • Gestational hypertension, third trimester   • 35 weeks gestation of pregnancy       Start Time:   Stop Time:     Interpretation A  Nonstress Test Interpretation A: Reactive (20 : Teresa Levy, RN)  Comments A: Reviewed with Dr. Lock (20 : Teresa Levy, RN)

## 2020-08-22 NOTE — PROGRESS NOTES
CC: Prenatal visit    Nirmala Tamayo is a 25 y.o.  at 35w4d.  Doing well.  Denies contractions, LOF, or VB.  Reports good FM.    /70   Wt 88.6 kg (195 lb 6.4 oz)   LMP 2019 (Approximate)   BMI 28.86 kg/m²   Fundal Height (cm): 36 cm  Fetal Heart Rate: 164     Problems (from 20 to present)     Problem Noted Resolved    Macrosomia of fetus affecting management of mother in third trimester 7/3/2020 by Tyler Lock MD No    Priority:  High      Rh negative status during pregnancy in second trimester 2020 by Tyler Lock MD No    Priority:  High            A/P: Nirmala Tamayo is a 25 y.o.  at 35w4d.  - RTC in 4 days  - Reviewed COVID-19 visitation policy  - Reviewed COVID-19 precautions     Diagnosis Plan   1.  screening for streptococcus B  Group B Strep (Molecular) - Swab, Vaginal/Rectum   2. Macrosomia of fetus affecting management of mother in third trimester, single or unspecified fetus     3. Rh negative status during pregnancy in second trimester       Tyler Lock MD  2020  17:13

## 2020-08-22 NOTE — DISCHARGE INSTRUCTIONS
Return to hospital for intense/regular contractions; water breaks; bright red vaginal bleeding; decreased fetal movement. Call hospital to be connected to OB provider on call for any questions or concerns.

## 2020-08-24 ENCOUNTER — ROUTINE PRENATAL (OUTPATIENT)
Dept: OBSTETRICS AND GYNECOLOGY | Facility: CLINIC | Age: 25
End: 2020-08-24

## 2020-08-24 VITALS — WEIGHT: 197.2 LBS | BODY MASS INDEX: 29.12 KG/M2 | SYSTOLIC BLOOD PRESSURE: 116 MMHG | DIASTOLIC BLOOD PRESSURE: 78 MMHG

## 2020-08-24 DIAGNOSIS — O13.3 GESTATIONAL HYPERTENSION, THIRD TRIMESTER: Primary | ICD-10-CM

## 2020-08-24 DIAGNOSIS — O36.63X0 MACROSOMIA OF FETUS AFFECTING MANAGEMENT OF MOTHER IN THIRD TRIMESTER, SINGLE OR UNSPECIFIED FETUS: ICD-10-CM

## 2020-08-24 DIAGNOSIS — Z3A.35 35 WEEKS GESTATION OF PREGNANCY: Primary | ICD-10-CM

## 2020-08-24 DIAGNOSIS — O26.892 RH NEGATIVE STATUS DURING PREGNANCY IN SECOND TRIMESTER: ICD-10-CM

## 2020-08-24 DIAGNOSIS — Z67.91 RH NEGATIVE STATUS DURING PREGNANCY IN SECOND TRIMESTER: ICD-10-CM

## 2020-08-24 DIAGNOSIS — O13.3 GESTATIONAL HYPERTENSION, THIRD TRIMESTER: ICD-10-CM

## 2020-08-24 PROCEDURE — 0502F SUBSEQUENT PRENATAL CARE: CPT | Performed by: OBSTETRICS & GYNECOLOGY

## 2020-08-24 NOTE — PROGRESS NOTES
CC: Prenatal visit    Nirmala Tamayo is a 25 y.o.  at 35w6d.  Doing well.  Denies contractions, LOF, or VB.  Reports good FM.    /78   Wt 89.4 kg (197 lb 3.2 oz)   LMP 2019 (Approximate)   BMI 29.12 kg/m²   SVE: Not done  Fundal Height (cm): 36 cm  Fetal Heart Rate: 134     Problems (from 20 to present)     Problem Noted Resolved    Gestational hypertension, third trimester 2020 by Tyler Lock MD No    Priority:  High      Overview Signed 2020  4:28 PM by Tyler Lock MD     On basis of blood pressures reported by patient.  Blood pressure monitor checked against ours         Macrosomia of fetus affecting management of mother in third trimester 7/3/2020 by Tyler Lock MD No    Priority:  High      Rh negative status during pregnancy in second trimester 2020 by Tyler Lock MD No    Priority:  High            A/P: Nirmala Tamayo is a 25 y.o.  at 35w6d.  - RTC in 4 days  - Reviewed COVID-19 visitation policy  - Reviewed COVID-19 precautions     Diagnosis Plan   1. 35 weeks gestation of pregnancy     2. Gestational hypertension, third trimester   no headaches visual changes or epigastric pain blood pressure is good today monitoring blood pressure at home and says is been good they are   3. Macrosomia of fetus affecting management of mother in third trimester, single or unspecified fetus     4. Rh negative status during pregnancy in second trimester       Tyler Lock MD  2020  16:45

## 2020-08-27 ENCOUNTER — ROUTINE PRENATAL (OUTPATIENT)
Dept: OBSTETRICS AND GYNECOLOGY | Facility: CLINIC | Age: 25
End: 2020-08-27

## 2020-08-27 VITALS — WEIGHT: 195.2 LBS | DIASTOLIC BLOOD PRESSURE: 82 MMHG | SYSTOLIC BLOOD PRESSURE: 124 MMHG | BODY MASS INDEX: 28.83 KG/M2

## 2020-08-27 DIAGNOSIS — Z67.91 RH NEGATIVE STATUS DURING PREGNANCY IN SECOND TRIMESTER: ICD-10-CM

## 2020-08-27 DIAGNOSIS — Z3A.36 36 WEEKS GESTATION OF PREGNANCY: ICD-10-CM

## 2020-08-27 DIAGNOSIS — O13.3 GESTATIONAL HYPERTENSION, THIRD TRIMESTER: Primary | ICD-10-CM

## 2020-08-27 DIAGNOSIS — O26.892 RH NEGATIVE STATUS DURING PREGNANCY IN SECOND TRIMESTER: ICD-10-CM

## 2020-08-27 DIAGNOSIS — O36.63X0 MACROSOMIA OF FETUS AFFECTING MANAGEMENT OF MOTHER IN THIRD TRIMESTER, SINGLE OR UNSPECIFIED FETUS: ICD-10-CM

## 2020-08-27 DIAGNOSIS — O26.843 UTERINE SIZE-DATE DISCREPANCY IN THIRD TRIMESTER: ICD-10-CM

## 2020-08-27 PROCEDURE — 0502F SUBSEQUENT PRENATAL CARE: CPT | Performed by: OBSTETRICS & GYNECOLOGY

## 2020-08-28 DIAGNOSIS — O26.843 UTERINE SIZE-DATE DISCREPANCY IN THIRD TRIMESTER: ICD-10-CM

## 2020-08-28 PROBLEM — Z3A.36 36 WEEKS GESTATION OF PREGNANCY: Status: ACTIVE | Noted: 2020-08-28

## 2020-08-28 RX ORDER — PROMETHAZINE HYDROCHLORIDE 25 MG/1
12.5 TABLET ORAL EVERY 6 HOURS PRN
Status: CANCELLED | OUTPATIENT
Start: 2020-08-28

## 2020-08-28 RX ORDER — CARBOPROST TROMETHAMINE 250 UG/ML
250 INJECTION, SOLUTION INTRAMUSCULAR AS NEEDED
Status: CANCELLED | OUTPATIENT
Start: 2020-08-28

## 2020-08-28 RX ORDER — METHYLERGONOVINE MALEATE 0.2 MG/ML
200 INJECTION INTRAVENOUS ONCE AS NEEDED
Status: CANCELLED | OUTPATIENT
Start: 2020-08-28

## 2020-08-28 RX ORDER — SODIUM CHLORIDE 0.9 % (FLUSH) 0.9 %
3 SYRINGE (ML) INJECTION EVERY 12 HOURS SCHEDULED
Status: CANCELLED | OUTPATIENT
Start: 2020-08-28

## 2020-08-28 RX ORDER — MISOPROSTOL 100 UG/1
800 TABLET ORAL AS NEEDED
Status: CANCELLED | OUTPATIENT
Start: 2020-08-28

## 2020-08-28 RX ORDER — SODIUM CHLORIDE 0.9 % (FLUSH) 0.9 %
3-10 SYRINGE (ML) INJECTION AS NEEDED
Status: CANCELLED | OUTPATIENT
Start: 2020-08-28

## 2020-08-28 RX ORDER — PROMETHAZINE HYDROCHLORIDE 25 MG/1
12.5 SUPPOSITORY RECTAL EVERY 6 HOURS PRN
Status: CANCELLED | OUTPATIENT
Start: 2020-08-28

## 2020-08-28 RX ORDER — LIDOCAINE HYDROCHLORIDE 10 MG/ML
5 INJECTION, SOLUTION EPIDURAL; INFILTRATION; INTRACAUDAL; PERINEURAL AS NEEDED
Status: CANCELLED | OUTPATIENT
Start: 2020-08-28

## 2020-08-28 NOTE — H&P
Obstetric History and Physical    Chief complaint gestational hypertension        Patient is a 25 y.o. female  currently at 36w3d, who presents with patient with diagnosis gestational hypertension largely based blood pressures taken at home.  Reviewed current recommendations for delivery at 37 weeks and [facility] recommendations for delivery at 3738 weeks.  The risk of induction of labor have been reviewed at length hypertonic contractions damage mother baby risk of iatrogenic  section and its risk.Both the short-term and long-term risks of  section are reviewed at length.  Short-term risks of bleeding, infection, problems with wound healing, damage to bowel, bladder or ureter.  Small, but real risk of maternal mortality is reviewed and understood.    Long-term risks include in future pregnancies accreta abruption, uterine rupture and stillbirth, among others might be ameliorated by a .  The patient and her family have been given opportunity to ask questions and these questions have been answered to their satisfaction.  After considering the risk benefits alternatives at length she wants to proceed with induction of labor.    Her prenatal care is has [facility] through [facility]     Obstetric History   #: 1, Date: 2013, Sex: Male, Weight: 3487 g (7 lb 11 oz), GA: 39w0d, Delivery: Vaginal, Spontaneous, Apgar1: None, Apgar5: None, Living: Living, Birth Comments: SPINAL HEADACHE; HAD BLOOD PATCH DONE POSTPARTUM     #: 2, Date: 2014, Sex: Female, Weight: 2835 g (6 lb 4 oz), GA: 36w0d, Delivery: Vaginal, Spontaneous, Apgar1: None, Apgar5: None, Living: Living, Birth Comments: IOL    #: 3, Date: 18, Sex: Male, Weight: 3317 g (7 lb 5 oz), GA: 37w5d, Delivery: Vaginal, Spontaneous, Apgar1: None, Apgar5: None, Living: Living, Birth Comments: IOL     #: 4, Date: None, Sex: None, Weight: None, GA: None, Delivery: None, Apgar1: None, Apgar5: None, Living: None,  Birth Comments: None       The following portions of the patients history were reviewed and updated as appropriate: current medications, allergies, past medical history, past surgical history, past family history, past social history and problem list .       Prenatal Information:  Prenatal Results     POC Urine Glucose/Protein     Test Value Reference Range Date Time    Urine Glucose Negative mg/dL Negative, 1000 mg/dL (3+) 08/30/19 1053    Urine Protein Negative mg/dL Negative 08/30/19 1053          Initial Prenatal Labs     Test Value Reference Range Date Time    Hemoglobin 12.2 g/dL 12.0 - 15.9 04/21/20 0741      12.5 g/dL 12.0 - 15.9 02/06/20 1142      12.1 g/dL 12.0 - 15.9 01/29/20 1443      12.4 g/dL 12.0 - 15.9 01/14/20 1834    Hematocrit 35.4 % 34.0 - 46.6 04/21/20 0741      38.5 % 34.0 - 46.6 02/06/20 1142      36.6 % 34.0 - 46.6 01/29/20 1443      37.8 % 34.0 - 46.6 01/14/20 1834    Platelets 148 10*3/mm3 140 - 450 08/19/20 1258      142 10*3/mm3 140 - 450 07/31/20 1435      185 10*3/mm3 140 - 450 04/21/20 0741      229 10*3/mm3 140 - 450 02/06/20 1142      198 10*3/mm3 140 - 450 01/29/20 1443      198 10*3/mm3 140 - 450 01/14/20 1834    Rubella IgG Positive   01/29/20 1443    Hepatitis B SAg Non-Reactive  Non-Reactive 01/29/20 1443    Hepatitis C Ab Non-Reactive  Non-Reactive 01/29/20 1443    RPR Non-Reactive  Non-Reactive 01/29/20 1443    ABO AB   01/29/20 1443    Rh Negative   01/29/20 1443    Antibody Screen Positive   01/29/20 1443      NEG   01/25/20 1813    HIV Non-Reactive  Non-Reactive 01/29/20 1443    Urine Culture 25,000 CFU/mL Mixed Gricelda Isolated   03/11/20 1638      No growth   01/29/20 1443      No growth   01/14/20 1907    Gonorrhea Negative  Negative 02/19/20 0934      Negative  Negative 01/14/20 1907    Chlamydia Negative  Negative 02/19/20 0934      Negative  Negative 01/14/20 1907    TSH 0.993 mIU/mL 0.270 - 4.200 06/21/19 1405          2nd and 3rd Trimester     Test Value Reference  Range Date Time    Hemoglobin (repeated) 13.6 g/dL 12.0 - 15.9 08/19/20 1258      11.9 g/dL 12.0 - 15.9 07/31/20 1435    Hematocrit (repeated) 39.8 % 34.0 - 46.6 08/19/20 1258      35.3 % 34.0 - 46.6 07/31/20 1435     mg/dL 74 - 180 07/08/20 1017      132 mg/dL 60 - 140 06/17/20 0933    Antibody Screen (repeated) Negative   06/17/20 0933    GTT Fasting        GTT 1 Hr        GTT 2 Hr        GTT 3 Hr        Group B Strep Negative  Negative 08/21/20 0844          Drug Screening     Test Value Reference Range Date Time    Amphetamine Screen Negative  Negative 01/29/20 1443    Barbiturate Screen Negative  Negative 01/29/20 1443    Benzodiazepine Screen Negative  Negative 01/29/20 1443    Methadone Screen Negative  Negative 01/29/20 1443    Phencyclidine Screen Negative  Negative 01/29/20 1443    Opiates Screen Negative  Negative 01/29/20 1443    THC Screen Negative  Negative 01/29/20 1443    Cocaine Screen Negative  Negative 01/29/20 1443    Propoxyphene Screen Negative  Negative 01/29/20 1443    Buprenorphine Screen Negative  Negative 01/29/20 1443    Methamphetamine Screen Negative  Negative 01/29/20 1443    Oxycodone Screen Negative  Negative 01/29/20 1443    Tricyclic Antidepressants Screen Negative  Negative 01/29/20 1443          Other (Risk screening)     Test Value Reference Range Date Time    Varicella IgG        Parvovirus IgG        CMV IgG        Cystic Fibrosis        Hemoglobin electrophoresis        NIPT        MSAFP-4        AFP (for NTD only)                  External Prenatal Results     Pregnancy Outside Results - Transcribed From Office Records - See Scanned Records For Details     Test Value Date Time    Hgb 13.6 g/dL 08/19/20 1258      11.9 g/dL 07/31/20 1435      12.2 g/dL 04/21/20 0741      12.5 g/dL 02/06/20 1142      12.1 g/dL 01/29/20 1443      12.4 g/dL 01/14/20 1834    Hct 39.8 % 08/19/20 1258      35.3 % 07/31/20 1435      35.4 % 04/21/20 0741      38.5 % 02/06/20 1142      36.6 %  20 1443      37.8 % 20 1834    ABO AB  20 1443    Rh Negative  20 1443    Antibody Screen Negative  20 0933      Positive  20 1443      NEG  20 1813    Glucose Fasting GTT       Glucose Tolerance Test 1 hour       Glucose Tolerance Test 3 hour       Gonorrhea (discrete) Negative  20 0934      Negative  20 1907    Chlamydia (discrete) Negative  20 0934      Negative  20 1907    RPR Non-Reactive  20 1443    VDRL       Syphilis Antibody       Rubella Positive  20 1443    HBsAg Non-Reactive  20 1443    Herpes Simplex Virus PCR       Herpes Simplex VIrus Culture       HIV Non-Reactive  20 1443    Hep C RNA Quant PCR       Hep C Antibody Non-Reactive  20 1443    AFP       Group B Strep Negative  20 0844    GBS Susceptibility to Clindamycin       GBS Susceptibility to Erythromycin       Fetal Fibronectin       Genetic Testing, Maternal Blood             Drug Screening     Test Value Date Time    Urine Drug Screen       Amphetamine Screen Negative  20 1443    Barbiturate Screen Negative  20 1443    Benzodiazepine Screen Negative  20 1443    Methadone Screen Negative  20 1443    Phencyclidine Screen Negative  20 1443    Opiates Screen Negative  20 1443    THC Screen Negative  20 1443    Cocaine Screen       Propoxyphene Screen Negative  20 1443    Buprenorphine Screen Negative  20 1443    Methamphetamine Screen       Oxycodone Screen Negative  20 1443    Tricyclic Antidepressants Screen Negative  20 1443                 Past OB History:     OB History    Para Term  AB Living   4 3 2 1 0 3   SAB TAB Ectopic Molar Multiple Live Births   0 0 0 0 0 3      # Outcome Date GA Lbr Peter/2nd Weight Sex Delivery Anes PTL Lv   4 Current            3 Term 18 37w5d  3317 g (7 lb 5 oz) M Vag-Spont None  IRMA      Birth Comments: IOL       Complications:  Gestational hypertension   2  2014 36w0d  2835 g (6 lb 4 oz) F Vag-Spont EPI  IRMA      Birth Comments: IOL      Complications: Gestational hypertension   1 Term 2013 39w0d  3487 g (7 lb 11 oz) M Vag-Spont EPI  IRMA      Birth Comments: SPINAL HEADACHE; HAD BLOOD PATCH DONE POSTPARTUM         ALLERGIES:     Allergies   Allergen Reactions   • Banana Angioedema and Hives     Itchy throat   • Latex Hives and Angioedema        Home Medications:     Prior to Admission medications    Medication Sig Start Date End Date Taking? Authorizing Provider   albuterol sulfate  (90 Base) MCG/ACT inhaler Inhale 2 puffs Every 4 (Four) Hours As Needed for Wheezing. 4/3/20  Yes Linnea Verduzco APRN   docusate sodium (COLACE) 100 MG capsule Take 100 mg by mouth 2 (Two) Times a Day.   Yes ProviderRosa MD   EPINEPHrine (EPIPEN 2-EMILIANA) 0.3 MG/0.3ML solution auto-injector injection as needed. 10/21/15  Yes Emergency, Nurse Ursula, RN   ferrous sulfate 325 (65 FE) MG tablet Take 1 tablet by mouth Daily With Breakfast for 30 days. 20 Yes Tyler Lock MD   mupirocin (Bactroban) 2 % ointment Apply  topically to the appropriate area as directed 3 (Three) Times a Day. 7/15/20  Yes Linnea Verduzco APRN   polyethylene glycol (MIRALAX) packet Take 17 g by mouth Daily.   Yes ProviderRosa MD   Prenatal Multivit-Min-Fe-FA (PRENATAL VITAMINS) 0.8 MG tablet Take 1 tablet by mouth Daily. 20 Yes Tyler Lock MD   promethazine (PHENERGAN) 25 MG tablet Take 0.5 tablets by mouth Every 6 (Six) Hours As Needed for Nausea or Vomiting. 20 Yes Tyler Lock MD       Past Medical History: Past Medical History:   Diagnosis Date   • Abnormal Pap smear of cervix    • Acute allergic reaction    • Acute bronchitis    • Acute pharyngitis    • Agoraphobia with panic attacks    • Allergic rhinitis    • Anemia    • Anxiety    • Anxiety state    • Asthma     Stable   • Back strain     • Constipation    • Cough    • Disturbance of skin sensation    • Gestational hypertension     with second pregnancy   • Headache    • History of transfusion     after second delivery   • HPV (human papilloma virus) infection    • Hx of preeclampsia, prior pregnancy, currently pregnant 1/2/2018   • Irregular periods    • Irritable bowel syndrome with constipation    • Low back pain    • Lumbosacral dysfunction    • Neck pain    • Palpitations    • Preeclampsia    • Rh negative state in antepartum period, third trimester 12/7/2017   • Rhinitis    • Severe depression (CMS/HCC)    • Spasm     cervical spasm   • Spinal headache    • Upper respiratory infection    • Urinary tract infection 7/14/2017   • Vaginal irritation    • Vitreous floaters     prob, not seen on exam   • Wheezing       Past Surgical History Past Surgical History:   Procedure Laterality Date   • PROCEDURE GENERIC CONVERTED  02/01/2016    Physical Therapy Consult   • WISDOM TOOTH EXTRACTION        Family History: Family History   Problem Relation Age of Onset   • Heart disease Mother    • Hypertension Mother    • Hyperthyroidism Mother    • Kidney failure Sister    • Heart failure Sister    • COPD Sister    • Cancer Other    • Diabetes Other    • Hypertension Other    • Stroke Other    • Thyroid disease Other    • Gallbladder disease Other         Gallstones   • Cervical cancer Maternal Grandmother    • Diabetes Maternal Grandmother    • No Known Problems Son    • Febrile seizures Daughter    • COPD Father    • Diabetes Paternal Grandmother    • Cirrhosis Paternal Grandmother    • Parkinsonism Maternal Grandfather    • Heart disease Maternal Grandfather    • Hypothyroidism Sister    • No Known Problems Son       Social History:  reports that she has never smoked. She has never used smokeless tobacco.   reports that she does not drink alcohol.   reports that she does not use drugs.        Review of Systems                                                                                                                       Constitutional: Denies night sweats    HENT: No hearing changes, denies ear pain    Eye: No eye pain; no foreign body in eye    Pulmonary: No hemoptysis    Cardiovascular: No claudication    GI: No hematemesis    Musculoskeletal: No arthralgias, no joint swelling    Endocrine: No polydipsia or polyuria    Hematologic: Denies any free bleeding    Psychiatric: Denies any delusions      Objective     Documented Vitals    20 1446   BP: 124/82   Weight: 88.5 kg (195 lb 3.2 oz)          OBGyn Exam  Constitutional: Appears to be in no acute distress; Eyes: sclera normal; Endocrine system: thyroid palpate is normal; Pulmonary system: lungs clear; Cardiovascular system: heart regular rate and rhythm; Gastrointestinal system: abdomen soft nontender, active bowel sounds; Urologic system: CVA negative; Psychiatric: appropriate insight; Neurologic: gait within normal limits       Last Labs  Lab Results   Component Value Date    WBC 11.32 (H) 2020    RBC 4.56 2020    HGB 13.6 2020    HCT 39.8 2020    MCV 87.3 2020    MCH 29.8 2020    MCHC 34.2 2020    RDW 13.9 2020    RDWSD 43.4 2020    MPV 10.7 2020     2020        Lab Results   Component Value Date    GLUCOSE 68 2020    BUN 7 2020    CREATININE 0.58 2020     2020    K 3.6 2020     2020    CO2 23.0 2020    CALCIUM 9.0 2020    PROTEINTOT 6.8 2020    ALBUMIN 3.90 2020    ALT 10 2020    AST 17 2020    ALKPHOS 138 (H) 2020    BILITOT 0.2 2020    EGFRIFNONA 127 2020    GLOB 2.9 2020    AGRATIO 1.3 2020    BCR 12.1 2020    ANIONGAP 13.0 2020       No results found for: HCGQUAL      Assessment/Plan:  1. 25 y.o. [id number].  Gestational hypertension after reviewing the risk benefits alternatives were gone plan  for induction of labor cervix about 2 to 3 cm    Nirmala Tamayo and I have discussed pain goals for this hospitalization after reviewing her current clinical condition, medical history and prior pain experiences.  The goal is to keep her pain level 3.  To help achieve this, I plan to use multimodal pain management.       This document has been electronically signed by Tyler Lock MD on August 28, 2020 18:32\    Please note that portions of this note were completed with a voice recognition program.

## 2020-08-28 NOTE — PROGRESS NOTES
CC: Prenatal visit    Nirmala Tamayo is a 25 y.o.  at 36w3d.  Doing well.  Denies contractions, LOF, or VB.  Reports good FM.    /82   Wt 88.5 kg (195 lb 3.2 oz)   LMP 2019 (Approximate)   BMI 28.83 kg/m²   SVE: 2           Problems (from 20 to present)     Problem Noted Resolved    Gestational hypertension, third trimester 2020 by Tyler Lock MD No    Priority:  High      Overview Signed 2020  4:28 PM by Tyler Lock MD     On basis of blood pressures reported by patient.  Blood pressure monitor checked against ours         Macrosomia of fetus affecting management of mother in third trimester 7/3/2020 by Tyler Lock MD No    Priority:  High      Rh negative status during pregnancy in second trimester 2020 by Tyler Lock MD No    Priority:  High            A/P: Nirmala Tamayo is a 25 y.o.  at 36w3d.  - RTC in 3 days  - Reviewed COVID-19 visitation policy  - Reviewed COVID-19 precautions     Diagnosis Plan   1. Gestational hypertension, third trimester  US Fetal Biophysical Profile;Without Non-Stress Testing patient denies headaches visual changes epigastric pain blood pressure taken at home basis for this diagnosis.  I gone over this extensively with patient.  Potential prematurity issues delivery 37-38 weeks but that is the recommendation at this point for [facility].  Have [facility] augmentation delivers from [employer] [facility] gynecology recommendation.  She is going to consider.  Says she wants to avoid induction of labor   2. Macrosomia of fetus affecting management of mother in third trimester, single or unspecified fetus     3. Rh negative status during pregnancy in second trimester       Tyler Lock MD  2020  18:25

## 2020-08-31 ENCOUNTER — ROUTINE PRENATAL (OUTPATIENT)
Dept: OBSTETRICS AND GYNECOLOGY | Facility: CLINIC | Age: 25
End: 2020-08-31

## 2020-08-31 ENCOUNTER — LAB (OUTPATIENT)
Dept: LAB | Facility: HOSPITAL | Age: 25
End: 2020-08-31

## 2020-08-31 VITALS — SYSTOLIC BLOOD PRESSURE: 138 MMHG | BODY MASS INDEX: 29.09 KG/M2 | DIASTOLIC BLOOD PRESSURE: 92 MMHG | WEIGHT: 197 LBS

## 2020-08-31 DIAGNOSIS — O36.63X0 MACROSOMIA OF FETUS AFFECTING MANAGEMENT OF MOTHER IN THIRD TRIMESTER, SINGLE OR UNSPECIFIED FETUS: ICD-10-CM

## 2020-08-31 DIAGNOSIS — O13.3 GESTATIONAL HYPERTENSION, THIRD TRIMESTER: ICD-10-CM

## 2020-08-31 DIAGNOSIS — O26.849 UTERINE SIZE DATE DISCREPANCY PREGNANCY: Primary | ICD-10-CM

## 2020-08-31 DIAGNOSIS — Z67.91 RH NEGATIVE STATUS DURING PREGNANCY IN SECOND TRIMESTER: ICD-10-CM

## 2020-08-31 DIAGNOSIS — O26.892 RH NEGATIVE STATUS DURING PREGNANCY IN SECOND TRIMESTER: ICD-10-CM

## 2020-08-31 LAB
ALBUMIN SERPL-MCNC: 3.6 G/DL (ref 3.5–5.2)
ALBUMIN/GLOB SERPL: 1.2 G/DL
ALP SERPL-CCNC: 154 U/L (ref 39–117)
ALT SERPL W P-5'-P-CCNC: 13 U/L (ref 1–33)
ANION GAP SERPL CALCULATED.3IONS-SCNC: 12 MMOL/L (ref 5–15)
AST SERPL-CCNC: 17 U/L (ref 1–32)
BILIRUB SERPL-MCNC: 0.2 MG/DL (ref 0–1.2)
BUN SERPL-MCNC: 8 MG/DL (ref 6–20)
BUN/CREAT SERPL: 12.3 (ref 7–25)
CALCIUM SPEC-SCNC: 9.2 MG/DL (ref 8.6–10.5)
CHLORIDE SERPL-SCNC: 102 MMOL/L (ref 98–107)
CO2 SERPL-SCNC: 24 MMOL/L (ref 22–29)
CREAT SERPL-MCNC: 0.65 MG/DL (ref 0.57–1)
CREAT UR-MCNC: 101.6 MG/DL
DEPRECATED RDW RBC AUTO: 44.6 FL (ref 37–54)
ERYTHROCYTE [DISTWIDTH] IN BLOOD BY AUTOMATED COUNT: 14.1 % (ref 12.3–15.4)
GFR SERPL CREATININE-BSD FRML MDRD: 111 ML/MIN/1.73
GLOBULIN UR ELPH-MCNC: 2.9 GM/DL
GLUCOSE SERPL-MCNC: 82 MG/DL (ref 65–99)
HCT VFR BLD AUTO: 40 % (ref 34–46.6)
HGB BLD-MCNC: 13.5 G/DL (ref 12–15.9)
MCH RBC QN AUTO: 29.9 PG (ref 26.6–33)
MCHC RBC AUTO-ENTMCNC: 33.8 G/DL (ref 31.5–35.7)
MCV RBC AUTO: 88.5 FL (ref 79–97)
PLATELET # BLD AUTO: 151 10*3/MM3 (ref 140–450)
PMV BLD AUTO: 11 FL (ref 6–12)
POTASSIUM SERPL-SCNC: 3.8 MMOL/L (ref 3.5–5.2)
PROT SERPL-MCNC: 6.5 G/DL (ref 6–8.5)
PROT UR-MCNC: 13 MG/DL
PROT/CREAT UR: 128 MG/G CREA (ref 0–200)
RBC # BLD AUTO: 4.52 10*6/MM3 (ref 3.77–5.28)
SODIUM SERPL-SCNC: 138 MMOL/L (ref 136–145)
WBC # BLD AUTO: 8.43 10*3/MM3 (ref 3.4–10.8)

## 2020-08-31 PROCEDURE — 84156 ASSAY OF PROTEIN URINE: CPT | Performed by: OBSTETRICS & GYNECOLOGY

## 2020-08-31 PROCEDURE — 0502F SUBSEQUENT PRENATAL CARE: CPT | Performed by: OBSTETRICS & GYNECOLOGY

## 2020-08-31 PROCEDURE — 85027 COMPLETE CBC AUTOMATED: CPT

## 2020-08-31 PROCEDURE — 80053 COMPREHEN METABOLIC PANEL: CPT

## 2020-08-31 PROCEDURE — 82570 ASSAY OF URINE CREATININE: CPT | Performed by: OBSTETRICS & GYNECOLOGY

## 2020-08-31 PROCEDURE — 36415 COLL VENOUS BLD VENIPUNCTURE: CPT

## 2020-08-31 NOTE — PROGRESS NOTES
CC: Prenatal visit    Nirmala Tamayo is a 25 y.o.  at 36w6d.  Doing well.  Denies contractions, LOF, or VB.  Reports good FM.    /92   Wt 89.4 kg (197 lb)   LMP 2019 (Approximate)   BMI 29.09 kg/m²   SVE: 3cm  Fundal Height (cm): 37 cm  Fetal Heart Rate: 155     Problems (from 20 to present)     Problem Noted Resolved    Gestational hypertension, third trimester 2020 by Tyler Lock MD No    Priority:  High      Overview Signed 2020  4:28 PM by Tyler Lock MD     On basis of blood pressures reported by patient.  Blood pressure monitor checked against ours         Macrosomia of fetus affecting management of mother in third trimester 7/3/2020 by Tyler Lock MD No    Priority:  High      Rh negative status during pregnancy in second trimester 2020 by Tyler Lock MD No    Priority:  High            A/P: Nirmala Tamayo is a 25 y.o.  at 36w6d.     - Reviewed COVID-19 visitation policy  - Reviewed COVID-19 precautions     Diagnosis Plan   1. Uterine size date discrepancy pregnancy  US Fetal Biophysical Profile;Without Non-Stress Testing   2. Gestational hypertension, third trimester  CBC (No Diff)    Comprehensive Metabolic Panel    Protein / Creatinine Ratio, Urine - Urine, Clean Catch is scheduled for induction of labor as scheduling allows.  Will check labs for triage   3. Macrosomia of fetus affecting management of mother in third trimester, single or unspecified fetus     4. Rh negative status during pregnancy in second trimester       Tyler Lock MD  2020  18:31

## 2020-09-02 ENCOUNTER — HOSPITAL ENCOUNTER (INPATIENT)
Facility: HOSPITAL | Age: 25
LOS: 2 days | Discharge: HOME | End: 2020-09-04
Attending: OBSTETRICS & GYNECOLOGY | Admitting: OBSTETRICS & GYNECOLOGY

## 2020-09-02 ENCOUNTER — HOSPITAL ENCOUNTER (INPATIENT)
Dept: LABOR AND DELIVERY | Facility: HOSPITAL | Age: 25
Discharge: HOME | End: 2020-09-02

## 2020-09-02 DIAGNOSIS — O13.3 GESTATIONAL HYPERTENSION, THIRD TRIMESTER: ICD-10-CM

## 2020-09-02 PROBLEM — O13.9 GESTATIONAL HYPERTENSION: Status: ACTIVE | Noted: 2020-09-02

## 2020-09-02 LAB
ABO GROUP BLD: NORMAL
ALBUMIN SERPL-MCNC: 3.3 G/DL (ref 3.5–5.2)
ALBUMIN/GLOB SERPL: 1.3 G/DL
ALP SERPL-CCNC: 136 U/L (ref 39–117)
ALT SERPL W P-5'-P-CCNC: 11 U/L (ref 1–33)
AMPHET+METHAMPHET UR QL: NEGATIVE
AMPHETAMINES UR QL: NEGATIVE
ANION GAP SERPL CALCULATED.3IONS-SCNC: 13 MMOL/L (ref 5–15)
ANTI-D, PASSIVE: NORMAL
APTT PPP: 27.4 SECONDS (ref 20–40.3)
AST SERPL-CCNC: 18 U/L (ref 1–32)
BARBITURATES UR QL SCN: NEGATIVE
BASOPHILS # BLD AUTO: 0.04 10*3/MM3 (ref 0–0.2)
BASOPHILS NFR BLD AUTO: 0.2 % (ref 0–1.5)
BENZODIAZ UR QL SCN: NEGATIVE
BILIRUB SERPL-MCNC: 0.3 MG/DL (ref 0–1.2)
BLD GP AB SCN SERPL QL: POSITIVE
BUN SERPL-MCNC: 5 MG/DL (ref 6–20)
BUN/CREAT SERPL: 8.5 (ref 7–25)
BUPRENORPHINE SERPL-MCNC: NEGATIVE NG/ML
CALCIUM SPEC-SCNC: 8.6 MG/DL (ref 8.6–10.5)
CANNABINOIDS SERPL QL: NEGATIVE
CHLORIDE SERPL-SCNC: 105 MMOL/L (ref 98–107)
CO2 SERPL-SCNC: 22 MMOL/L (ref 22–29)
COCAINE UR QL: NEGATIVE
CREAT SERPL-MCNC: 0.59 MG/DL (ref 0.57–1)
DEPRECATED RDW RBC AUTO: 44.7 FL (ref 37–54)
DEPRECATED RDW RBC AUTO: 45.2 FL (ref 37–54)
EOSINOPHIL # BLD AUTO: 0.02 10*3/MM3 (ref 0–0.4)
EOSINOPHIL NFR BLD AUTO: 0.1 % (ref 0.3–6.2)
ERYTHROCYTE [DISTWIDTH] IN BLOOD BY AUTOMATED COUNT: 14 % (ref 12.3–15.4)
ERYTHROCYTE [DISTWIDTH] IN BLOOD BY AUTOMATED COUNT: 14 % (ref 12.3–15.4)
FIBRINOGEN PPP-MCNC: 467 MG/DL (ref 228–514)
GFR SERPL CREATININE-BSD FRML MDRD: 124 ML/MIN/1.73
GLOBULIN UR ELPH-MCNC: 2.5 GM/DL
GLUCOSE SERPL-MCNC: 73 MG/DL (ref 65–99)
HCT VFR BLD AUTO: 36.1 % (ref 34–46.6)
HCT VFR BLD AUTO: 37.4 % (ref 34–46.6)
HGB BLD-MCNC: 12.3 G/DL (ref 12–15.9)
HGB BLD-MCNC: 12.4 G/DL (ref 12–15.9)
HOLD SPECIMEN: NORMAL
IMM GRANULOCYTES # BLD AUTO: 0.13 10*3/MM3 (ref 0–0.05)
IMM GRANULOCYTES NFR BLD AUTO: 0.8 % (ref 0–0.5)
INR PPP: 1.04 (ref 0.8–1.2)
LYMPHOCYTES # BLD AUTO: 1.02 10*3/MM3 (ref 0.7–3.1)
LYMPHOCYTES NFR BLD AUTO: 6.1 % (ref 19.6–45.3)
Lab: NORMAL
MCH RBC QN AUTO: 29.4 PG (ref 26.6–33)
MCH RBC QN AUTO: 30.2 PG (ref 26.6–33)
MCHC RBC AUTO-ENTMCNC: 32.9 G/DL (ref 31.5–35.7)
MCHC RBC AUTO-ENTMCNC: 34.3 G/DL (ref 31.5–35.7)
MCV RBC AUTO: 88 FL (ref 79–97)
MCV RBC AUTO: 89.3 FL (ref 79–97)
METHADONE UR QL SCN: NEGATIVE
MONOCYTES # BLD AUTO: 0.99 10*3/MM3 (ref 0.1–0.9)
MONOCYTES NFR BLD AUTO: 5.9 % (ref 5–12)
NEUTROPHILS NFR BLD AUTO: 14.58 10*3/MM3 (ref 1.7–7)
NEUTROPHILS NFR BLD AUTO: 86.9 % (ref 42.7–76)
NRBC BLD AUTO-RTO: 0 /100 WBC (ref 0–0.2)
OPIATES UR QL: NEGATIVE
OXYCODONE UR QL SCN: NEGATIVE
PCP UR QL SCN: NEGATIVE
PLATELET # BLD AUTO: 126 10*3/MM3 (ref 140–450)
PLATELET # BLD AUTO: 130 10*3/MM3 (ref 140–450)
PMV BLD AUTO: 11.2 FL (ref 6–12)
PMV BLD AUTO: 11.2 FL (ref 6–12)
POTASSIUM SERPL-SCNC: 4 MMOL/L (ref 3.5–5.2)
PROPOXYPH UR QL: NEGATIVE
PROT SERPL-MCNC: 5.8 G/DL (ref 6–8.5)
PROTHROMBIN TIME: 14 SECONDS (ref 11.1–15.3)
RBC # BLD AUTO: 4.1 10*6/MM3 (ref 3.77–5.28)
RBC # BLD AUTO: 4.19 10*6/MM3 (ref 3.77–5.28)
RBC MORPH BLD: NORMAL
RH BLD: NEGATIVE
SMALL PLATELETS BLD QL SMEAR: NORMAL
SODIUM SERPL-SCNC: 140 MMOL/L (ref 136–145)
T&S EXPIRATION DATE: NORMAL
TRICYCLICS UR QL SCN: NEGATIVE
WBC # BLD AUTO: 16.78 10*3/MM3 (ref 3.4–10.8)
WBC # BLD AUTO: 9.14 10*3/MM3 (ref 3.4–10.8)
WBC MORPH BLD: NORMAL

## 2020-09-02 PROCEDURE — 85007 BL SMEAR W/DIFF WBC COUNT: CPT | Performed by: OBSTETRICS & GYNECOLOGY

## 2020-09-02 PROCEDURE — 85025 COMPLETE CBC W/AUTO DIFF WBC: CPT | Performed by: OBSTETRICS & GYNECOLOGY

## 2020-09-02 PROCEDURE — 85610 PROTHROMBIN TIME: CPT | Performed by: OBSTETRICS & GYNECOLOGY

## 2020-09-02 PROCEDURE — 86870 RBC ANTIBODY IDENTIFICATION: CPT | Performed by: OBSTETRICS & GYNECOLOGY

## 2020-09-02 PROCEDURE — 86901 BLOOD TYPING SEROLOGIC RH(D): CPT | Performed by: OBSTETRICS & GYNECOLOGY

## 2020-09-02 PROCEDURE — 10907ZC DRAINAGE OF AMNIOTIC FLUID, THERAPEUTIC FROM PRODUCTS OF CONCEPTION, VIA NATURAL OR ARTIFICIAL OPENING: ICD-10-PCS | Performed by: OBSTETRICS & GYNECOLOGY

## 2020-09-02 PROCEDURE — 59400 OBSTETRICAL CARE: CPT | Performed by: OBSTETRICS & GYNECOLOGY

## 2020-09-02 PROCEDURE — 25010000002 ONDANSETRON PER 1 MG: Performed by: FAMILY MEDICINE

## 2020-09-02 PROCEDURE — 86900 BLOOD TYPING SEROLOGIC ABO: CPT | Performed by: OBSTETRICS & GYNECOLOGY

## 2020-09-02 PROCEDURE — 85730 THROMBOPLASTIN TIME PARTIAL: CPT | Performed by: OBSTETRICS & GYNECOLOGY

## 2020-09-02 PROCEDURE — 85384 FIBRINOGEN ACTIVITY: CPT | Performed by: OBSTETRICS & GYNECOLOGY

## 2020-09-02 PROCEDURE — 85027 COMPLETE CBC AUTOMATED: CPT | Performed by: OBSTETRICS & GYNECOLOGY

## 2020-09-02 PROCEDURE — 80053 COMPREHEN METABOLIC PANEL: CPT | Performed by: OBSTETRICS & GYNECOLOGY

## 2020-09-02 PROCEDURE — 86850 RBC ANTIBODY SCREEN: CPT | Performed by: OBSTETRICS & GYNECOLOGY

## 2020-09-02 PROCEDURE — 80306 DRUG TEST PRSMV INSTRMNT: CPT | Performed by: OBSTETRICS & GYNECOLOGY

## 2020-09-02 RX ORDER — CARBOPROST TROMETHAMINE 250 UG/ML
250 INJECTION, SOLUTION INTRAMUSCULAR AS NEEDED
Status: DISCONTINUED | OUTPATIENT
Start: 2020-09-02 | End: 2020-09-02 | Stop reason: HOSPADM

## 2020-09-02 RX ORDER — KETOROLAC TROMETHAMINE 15 MG/ML
15 INJECTION, SOLUTION INTRAMUSCULAR; INTRAVENOUS EVERY 6 HOURS PRN
Status: ACTIVE | OUTPATIENT
Start: 2020-09-02 | End: 2020-09-03

## 2020-09-02 RX ORDER — MISOPROSTOL 200 UG/1
400 TABLET ORAL ONCE
Status: DISCONTINUED | OUTPATIENT
Start: 2020-09-02 | End: 2020-09-04 | Stop reason: HOSPADM

## 2020-09-02 RX ORDER — OXYTOCIN/0.9 % SODIUM CHLORIDE 30/500 ML
650 PLASTIC BAG, INJECTION (ML) INTRAVENOUS ONCE
Status: CANCELLED | OUTPATIENT
Start: 2020-09-02 | End: 2020-09-02

## 2020-09-02 RX ORDER — PRENATAL VIT/IRON FUM/FOLIC AC 27MG-0.8MG
1 TABLET ORAL DAILY
Status: DISCONTINUED | OUTPATIENT
Start: 2020-09-03 | End: 2020-09-04 | Stop reason: HOSPADM

## 2020-09-02 RX ORDER — OXYTOCIN/0.9 % SODIUM CHLORIDE 30/500 ML
2-20 PLASTIC BAG, INJECTION (ML) INTRAVENOUS
Status: DISCONTINUED | OUTPATIENT
Start: 2020-09-02 | End: 2020-09-02 | Stop reason: HOSPADM

## 2020-09-02 RX ORDER — HYDROCORTISONE 25 MG/G
1 CREAM TOPICAL AS NEEDED
Status: DISCONTINUED | OUTPATIENT
Start: 2020-09-02 | End: 2020-09-04 | Stop reason: HOSPADM

## 2020-09-02 RX ORDER — ONDANSETRON 2 MG/ML
4 INJECTION INTRAMUSCULAR; INTRAVENOUS EVERY 8 HOURS PRN
Status: DISCONTINUED | OUTPATIENT
Start: 2020-09-02 | End: 2020-09-04 | Stop reason: HOSPADM

## 2020-09-02 RX ORDER — OXYTOCIN/0.9 % SODIUM CHLORIDE 30/500 ML
85 PLASTIC BAG, INJECTION (ML) INTRAVENOUS ONCE
Status: CANCELLED | OUTPATIENT
Start: 2020-09-02 | End: 2020-09-02

## 2020-09-02 RX ORDER — CARBOPROST TROMETHAMINE 250 UG/ML
250 INJECTION, SOLUTION INTRAMUSCULAR ONCE
Status: DISCONTINUED | OUTPATIENT
Start: 2020-09-02 | End: 2020-09-04 | Stop reason: HOSPADM

## 2020-09-02 RX ORDER — SODIUM CHLORIDE 0.9 % (FLUSH) 0.9 %
3 SYRINGE (ML) INJECTION EVERY 12 HOURS SCHEDULED
Status: DISCONTINUED | OUTPATIENT
Start: 2020-09-02 | End: 2020-09-02 | Stop reason: HOSPADM

## 2020-09-02 RX ORDER — DOCUSATE SODIUM 100 MG/1
100 CAPSULE, LIQUID FILLED ORAL 2 TIMES DAILY
Status: DISCONTINUED | OUTPATIENT
Start: 2020-09-02 | End: 2020-09-04 | Stop reason: HOSPADM

## 2020-09-02 RX ORDER — HYDROCODONE BITARTRATE AND ACETAMINOPHEN 7.5; 325 MG/1; MG/1
1 TABLET ORAL EVERY 4 HOURS PRN
Status: DISCONTINUED | OUTPATIENT
Start: 2020-09-02 | End: 2020-09-04 | Stop reason: HOSPADM

## 2020-09-02 RX ORDER — ACETAMINOPHEN 500 MG
1000 TABLET ORAL 4 TIMES DAILY
Status: DISCONTINUED | OUTPATIENT
Start: 2020-09-02 | End: 2020-09-04 | Stop reason: HOSPADM

## 2020-09-02 RX ORDER — SODIUM CHLORIDE 0.9 % (FLUSH) 0.9 %
3-10 SYRINGE (ML) INJECTION AS NEEDED
Status: DISCONTINUED | OUTPATIENT
Start: 2020-09-02 | End: 2020-09-02 | Stop reason: HOSPADM

## 2020-09-02 RX ORDER — SODIUM CHLORIDE 0.9 % (FLUSH) 0.9 %
1-10 SYRINGE (ML) INJECTION AS NEEDED
Status: DISCONTINUED | OUTPATIENT
Start: 2020-09-02 | End: 2020-09-04 | Stop reason: HOSPADM

## 2020-09-02 RX ORDER — PROMETHAZINE HYDROCHLORIDE 12.5 MG/1
12.5 TABLET ORAL EVERY 6 HOURS PRN
Status: DISCONTINUED | OUTPATIENT
Start: 2020-09-02 | End: 2020-09-02 | Stop reason: HOSPADM

## 2020-09-02 RX ORDER — METHYLERGONOVINE MALEATE 0.2 MG/ML
200 INJECTION INTRAVENOUS ONCE AS NEEDED
Status: DISCONTINUED | OUTPATIENT
Start: 2020-09-02 | End: 2020-09-02 | Stop reason: HOSPADM

## 2020-09-02 RX ORDER — MISOPROSTOL 200 UG/1
800 TABLET ORAL AS NEEDED
Status: DISCONTINUED | OUTPATIENT
Start: 2020-09-02 | End: 2020-09-02 | Stop reason: HOSPADM

## 2020-09-02 RX ORDER — PROMETHAZINE HYDROCHLORIDE 12.5 MG/1
12.5 SUPPOSITORY RECTAL EVERY 6 HOURS PRN
Status: DISCONTINUED | OUTPATIENT
Start: 2020-09-02 | End: 2020-09-02 | Stop reason: HOSPADM

## 2020-09-02 RX ORDER — EPINEPHRINE 1 MG/ML
1 INJECTION, SOLUTION INTRAMUSCULAR; SUBCUTANEOUS ONCE
Status: DISCONTINUED | OUTPATIENT
Start: 2020-09-02 | End: 2020-09-04 | Stop reason: HOSPADM

## 2020-09-02 RX ORDER — MISOPROSTOL 200 UG/1
600 TABLET ORAL ONCE
Status: DISCONTINUED | OUTPATIENT
Start: 2020-09-02 | End: 2020-09-04 | Stop reason: HOSPADM

## 2020-09-02 RX ORDER — LIDOCAINE HYDROCHLORIDE 10 MG/ML
5 INJECTION, SOLUTION EPIDURAL; INFILTRATION; INTRACAUDAL; PERINEURAL AS NEEDED
Status: DISCONTINUED | OUTPATIENT
Start: 2020-09-02 | End: 2020-09-02 | Stop reason: HOSPADM

## 2020-09-02 RX ORDER — BISACODYL 10 MG
10 SUPPOSITORY, RECTAL RECTAL DAILY PRN
Status: DISCONTINUED | OUTPATIENT
Start: 2020-09-03 | End: 2020-09-04 | Stop reason: HOSPADM

## 2020-09-02 RX ORDER — OXYTOCIN/0.9 % SODIUM CHLORIDE 30/500 ML
PLASTIC BAG, INJECTION (ML) INTRAVENOUS
Status: COMPLETED
Start: 2020-09-02 | End: 2020-09-02

## 2020-09-02 RX ORDER — MISOPROSTOL 200 UG/1
TABLET ORAL
Status: DISPENSED
Start: 2020-09-02 | End: 2020-09-03

## 2020-09-02 RX ORDER — LANOLIN 100 %
OINTMENT (GRAM) TOPICAL
Status: DISCONTINUED | OUTPATIENT
Start: 2020-09-02 | End: 2020-09-04 | Stop reason: HOSPADM

## 2020-09-02 RX ORDER — ALBUTEROL SULFATE 90 UG/1
2 AEROSOL, METERED RESPIRATORY (INHALATION) EVERY 4 HOURS PRN
Status: DISCONTINUED | OUTPATIENT
Start: 2020-09-02 | End: 2020-09-04 | Stop reason: HOSPADM

## 2020-09-02 RX ORDER — MISOPROSTOL 200 UG/1
TABLET ORAL
Status: COMPLETED
Start: 2020-09-02 | End: 2020-09-02

## 2020-09-02 RX ORDER — DIPHENHYDRAMINE HCL 25 MG
25 CAPSULE ORAL NIGHTLY PRN
Status: DISCONTINUED | OUTPATIENT
Start: 2020-09-02 | End: 2020-09-04 | Stop reason: HOSPADM

## 2020-09-02 RX ADMIN — Medication 2 UNITS: at 11:23

## 2020-09-02 RX ADMIN — ACETAMINOPHEN 1000 MG: 500 TABLET ORAL at 17:32

## 2020-09-02 RX ADMIN — ONDANSETRON 4 MG: 2 INJECTION INTRAMUSCULAR; INTRAVENOUS at 15:48

## 2020-09-02 RX ADMIN — OXYTOCIN-SODIUM CHLORIDE 0.9% IV SOLN 30 UNIT/500ML 2 UNITS: 30-0.9/5 SOLUTION at 11:23

## 2020-09-02 RX ADMIN — DOCUSATE SODIUM 100 MG: 100 CAPSULE, LIQUID FILLED ORAL at 20:42

## 2020-09-02 RX ADMIN — MISOPROSTOL 400 MCG: 200 TABLET ORAL at 14:23

## 2020-09-02 NOTE — NON STRESS TEST
Nirmala Tamayo, a  at 37w1d with an ELISE of 2020, by Last Menstrual Period, was seen at [facility] LABOR DELIVERY for a nonstress test.    Chief Complaint   Patient presents with   • Scheduled Induction       Patient Active Problem List   Diagnosis   • Sore throat   • Anxiety   • Fever   • Low back pain with radiation   • Lumbar disc disorder   • Irritable bowel syndrome with constipation   • Epigastric pain   • Acute upper respiratory infection   • Left otitis media   • Acne vulgaris   • Encounter for surveillance of contraceptive pills   • Acute bronchiolitis   • Cough   • Threatened    • 9 weeks gestation of pregnancy   • Urinary frequency   • 15 weeks gestation of pregnancy   • Rh negative status during pregnancy in second trimester   • Headache   • 18 weeks gestation of pregnancy   • Encounter for  screening for malformation using ultrasound   • Multigravida   •  uterine contractions, antepartum   • 25 weeks gestation of pregnancy   • Fetal size inconsistent with dates   • History of premature delivery   • 26 weeks gestation of pregnancy   • 28 weeks gestation of pregnancy   • Macrosomia of fetus affecting management of mother in third trimester   • 29 weeks gestation of pregnancy   • 31 weeks gestation of pregnancy   • 32 weeks gestation of pregnancy   • 34 weeks gestation of pregnancy   • Gestational hypertension, third trimester   • 35 weeks gestation of pregnancy   • 36 weeks gestation of pregnancy       Start Time: 0620  Stop Time: 0645    Interpretation A  Nonstress Test Interpretation A: Reactive (20 : Antoinette Reid, RN)  Comments A: Reviewed with Dr. Lock.  (20 : Antoinette Reid, RN)

## 2020-09-02 NOTE — PROGRESS NOTES
Patient's lochia continues much improved.  Margarita dc.  Her hemoglobin is stable to slightly improved from a.m.  Situation reviewed with patient she wants to wait on D&C which I think is the best option.  There is good to be some risk of bleeding later and possible need for D&C

## 2020-09-02 NOTE — PROGRESS NOTES
Patient has had some trickling after delivery not an extremely large amount.  Since there was some question of complete delivery of membranes of placenta I think we need to do a postpartum D&C risk benefits alternatives reviewed with family

## 2020-09-02 NOTE — PROGRESS NOTES
Patient's bleeding has improved we will plan to observe over about the next hour and obtain laboratory studies if continues improved and laboratory proceed with D&C but if any question we will proceed.  Preparations for D&C being made

## 2020-09-02 NOTE — L&D DELIVERY NOTE
[facility]  Vaginal Delivery Note    Delivery     Delivery: Vaginal, Spontaneous     YOB: 2020    Time of Birth:  Gestational Age 1:50 PM   37w1d     Anesthesia: None     Delivering clinician: Tyler Lock    Forceps?   No   Vacuum? No    Shoulder dystocia present: No        Delivery narrative: Patient progressed rapidly in labor and very rapidly third stage of labor delivered in a controlled fashion a viable infant over intact perineum shoulders came without difficulty.  Placenta delivered spontaneously.  The membranes trailed and appeared to count intact.  Uterus cannot be explored because inadequate anesthesia.  The patient had some mild increased bleeding that appeared to respond to buccal misoprostol    Infant    Findings: male  infant     Infant observations: Weight: 3490 g (7 lb 11.1 oz)   Length: 20.079  in  Observations/Comments:         Apgars: 9   @ 1 minute /    9   @ 5 minutes          Placenta, Cord, and Fluid    Placenta delivered  Spontaneous  at   9/2/2020  1:53 PM     Cord: 3 vessels  present.   Nuchal Cord?  yes; Number of nuchal loops present:       Cord blood obtained: Yes    Cord gases obtained:  No    Cord gas results: Venous:  No results found for: PHCVEN    Arterial:  No results found for: PHCART     Repair    Episiotomy: None     No    Lacerations: No   Estimated Blood Loss:  500           Complicatin postpartum hemorrhage    Disposition  Mother to Remain in LD  in stable condition currently.  Baby to NBN  in stable condition currently.      Tyler Lock MD  09/02/20  15:16

## 2020-09-02 NOTE — INTERVAL H&P NOTE
H&P updated. The patient was examined and DTRs 2+/4 no clonus no headaches visual changes or epigastric pain.  Ultrasound confirms vertex presentation

## 2020-09-03 LAB
ANISOCYTOSIS BLD QL: NORMAL
BASOPHILS # BLD AUTO: 0.03 10*3/MM3 (ref 0–0.2)
BASOPHILS NFR BLD AUTO: 0.3 % (ref 0–1.5)
DEPRECATED RDW RBC AUTO: 45 FL (ref 37–54)
EOSINOPHIL # BLD AUTO: 0.07 10*3/MM3 (ref 0–0.4)
EOSINOPHIL NFR BLD AUTO: 0.8 % (ref 0.3–6.2)
ERYTHROCYTE [DISTWIDTH] IN BLOOD BY AUTOMATED COUNT: 13.9 % (ref 12.3–15.4)
HCT VFR BLD AUTO: 34.3 % (ref 34–46.6)
HGB BLD-MCNC: 11.3 G/DL (ref 12–15.9)
IMM GRANULOCYTES # BLD AUTO: 0.07 10*3/MM3 (ref 0–0.05)
IMM GRANULOCYTES NFR BLD AUTO: 0.8 % (ref 0–0.5)
LYMPHOCYTES # BLD AUTO: 1.77 10*3/MM3 (ref 0.7–3.1)
LYMPHOCYTES NFR BLD AUTO: 19.1 % (ref 19.6–45.3)
MCH RBC QN AUTO: 29.4 PG (ref 26.6–33)
MCHC RBC AUTO-ENTMCNC: 32.9 G/DL (ref 31.5–35.7)
MCV RBC AUTO: 89.1 FL (ref 79–97)
MONOCYTES # BLD AUTO: 0.67 10*3/MM3 (ref 0.1–0.9)
MONOCYTES NFR BLD AUTO: 7.2 % (ref 5–12)
NEUTROPHILS NFR BLD AUTO: 6.66 10*3/MM3 (ref 1.7–7)
NEUTROPHILS NFR BLD AUTO: 71.8 % (ref 42.7–76)
NRBC BLD AUTO-RTO: 0 /100 WBC (ref 0–0.2)
PLATELET # BLD AUTO: 124 10*3/MM3 (ref 140–450)
PMV BLD AUTO: 11.4 FL (ref 6–12)
RBC # BLD AUTO: 3.85 10*6/MM3 (ref 3.77–5.28)
RBC MORPH BLD: NORMAL
SMALL PLATELETS BLD QL SMEAR: ADEQUATE
WBC # BLD AUTO: 9.27 10*3/MM3 (ref 3.4–10.8)
WBC MORPH BLD: NORMAL

## 2020-09-03 PROCEDURE — 99024 POSTOP FOLLOW-UP VISIT: CPT | Performed by: OBSTETRICS & GYNECOLOGY

## 2020-09-03 PROCEDURE — 85025 COMPLETE CBC W/AUTO DIFF WBC: CPT | Performed by: OBSTETRICS & GYNECOLOGY

## 2020-09-03 PROCEDURE — 85007 BL SMEAR W/DIFF WBC COUNT: CPT | Performed by: OBSTETRICS & GYNECOLOGY

## 2020-09-03 RX ORDER — FERROUS SULFATE TAB EC 324 MG (65 MG FE EQUIVALENT) 324 (65 FE) MG
324 TABLET DELAYED RESPONSE ORAL
Status: DISCONTINUED | OUTPATIENT
Start: 2020-09-03 | End: 2020-09-04 | Stop reason: HOSPADM

## 2020-09-03 RX ORDER — IBUPROFEN 600 MG/1
600 TABLET ORAL EVERY 8 HOURS SCHEDULED
Status: DISCONTINUED | OUTPATIENT
Start: 2020-09-03 | End: 2020-09-04 | Stop reason: HOSPADM

## 2020-09-03 RX ADMIN — ACETAMINOPHEN 1000 MG: 500 TABLET ORAL at 18:58

## 2020-09-03 RX ADMIN — PRENATAL VIT W/ FE FUMARATE-FA TAB 27-0.8 MG 1 TABLET: 27-0.8 TAB at 09:13

## 2020-09-03 RX ADMIN — ACETAMINOPHEN 1000 MG: 500 TABLET ORAL at 03:12

## 2020-09-03 RX ADMIN — IBUPROFEN 600 MG: 600 TABLET, FILM COATED ORAL at 09:14

## 2020-09-03 RX ADMIN — DOCUSATE SODIUM 100 MG: 100 CAPSULE, LIQUID FILLED ORAL at 22:24

## 2020-09-03 RX ADMIN — IBUPROFEN 600 MG: 600 TABLET, FILM COATED ORAL at 22:24

## 2020-09-03 RX ADMIN — DOCUSATE SODIUM 100 MG: 100 CAPSULE, LIQUID FILLED ORAL at 09:14

## 2020-09-03 RX ADMIN — ACETAMINOPHEN 1000 MG: 500 TABLET ORAL at 09:50

## 2020-09-03 NOTE — PROGRESS NOTES
[facility]  Nirmala Tamayo  : 1995  MRN: [id number]  CSN: [id number]    Postpartum Day #1  Subjective   The patient has no complaints staff reports bleeding is well within normal limits     Objective     Min/max vitals past 24 hours:   Temp  Min: 97.6 °F (36.4 °C)  Max: 98.8 °F (37.1 °C)  BP  Min: 0/0  Max: 146/86  Pulse  Min: 59  Max: 88  Pulse  Min: 59  Max: 88        Abdomen: soft, nontender; bowel sounds normal; no masses   fundus firm and nontender   Calves: Nontender, no cords palpable   Pelvic: deferred     Lab Results   Component Value Date    WBC 9.27 2020    HGB 11.3 (L) 2020    HCT 34.3 2020    MCV 89.1 2020     (L) 2020    ABORH AB NEG 2020    RH Negative 2020    HEPBSAG Non-Reactive 2020        Assessment   1. Postpartum Day #1 S/P vaginal delivery     Plan   1. Continue routine postpartum care          This document has been electronically signed by Tyler Lock MD on September 3, 2020 07:32

## 2020-09-03 NOTE — PLAN OF CARE
Problem: Patient Care Overview  Goal: Plan of Care Review  Outcome: Ongoing (interventions implemented as appropriate)  Flowsheets  Taken 9/3/2020 0129  Progress: improving  Outcome Summary: VSS, lochia light, small clot occassionally, ambulating in room, pain controlled with PRN meds  Taken 9/2/2020 6421  Plan of Care Reviewed With: patient;spouse

## 2020-09-04 VITALS
BODY MASS INDEX: 29.09 KG/M2 | OXYGEN SATURATION: 98 % | WEIGHT: 197 LBS | TEMPERATURE: 98.6 F | RESPIRATION RATE: 18 BRPM | SYSTOLIC BLOOD PRESSURE: 125 MMHG | HEART RATE: 104 BPM | DIASTOLIC BLOOD PRESSURE: 82 MMHG

## 2020-09-04 PROBLEM — Z3A.26 26 WEEKS GESTATION OF PREGNANCY: Status: RESOLVED | Noted: 2020-06-17 | Resolved: 2020-09-04

## 2020-09-04 PROBLEM — Z3A.36 36 WEEKS GESTATION OF PREGNANCY: Status: RESOLVED | Noted: 2020-08-28 | Resolved: 2020-09-04

## 2020-09-04 PROBLEM — Z3A.35 35 WEEKS GESTATION OF PREGNANCY: Status: RESOLVED | Noted: 2020-08-20 | Resolved: 2020-09-04

## 2020-09-04 PROBLEM — Z3A.34 34 WEEKS GESTATION OF PREGNANCY: Status: RESOLVED | Noted: 2020-08-13 | Resolved: 2020-09-04

## 2020-09-04 PROBLEM — Z3A.32 32 WEEKS GESTATION OF PREGNANCY: Status: RESOLVED | Noted: 2020-08-04 | Resolved: 2020-09-04

## 2020-09-04 PROBLEM — Z3A.29 29 WEEKS GESTATION OF PREGNANCY: Status: RESOLVED | Noted: 2020-07-08 | Resolved: 2020-09-04

## 2020-09-04 PROBLEM — Z3A.28 28 WEEKS GESTATION OF PREGNANCY: Status: RESOLVED | Noted: 2020-07-03 | Resolved: 2020-09-04

## 2020-09-04 PROBLEM — Z3A.31 31 WEEKS GESTATION OF PREGNANCY: Status: RESOLVED | Noted: 2020-07-26 | Resolved: 2020-09-04

## 2020-09-04 PROBLEM — Z87.51 HISTORY OF PREMATURE DELIVERY: Status: RESOLVED | Noted: 2020-06-17 | Resolved: 2020-09-04

## 2020-09-04 LAB
FETAL BLEED: NEGATIVE
HOLD SPECIMEN: NORMAL
NUMBER OF DOSES: NORMAL

## 2020-09-04 PROCEDURE — 99024 POSTOP FOLLOW-UP VISIT: CPT | Performed by: OBSTETRICS & GYNECOLOGY

## 2020-09-04 PROCEDURE — 25010000003 RHO D IMMUNE GLOBULIN 1500 UNITS SOLUTION PREFILLED SYRINGE: Performed by: OBSTETRICS & GYNECOLOGY

## 2020-09-04 PROCEDURE — 92585: CPT

## 2020-09-04 PROCEDURE — 85461 HEMOGLOBIN FETAL: CPT | Performed by: OBSTETRICS & GYNECOLOGY

## 2020-09-04 RX ORDER — IBUPROFEN 600 MG/1
600 TABLET ORAL EVERY 8 HOURS SCHEDULED
Qty: 90 TABLET | Refills: 0 | Status: SHIPPED | OUTPATIENT
Start: 2020-09-04 | End: 2020-09-17

## 2020-09-04 RX ADMIN — DOCUSATE SODIUM 100 MG: 100 CAPSULE, LIQUID FILLED ORAL at 09:14

## 2020-09-04 RX ADMIN — PRENATAL VIT W/ FE FUMARATE-FA TAB 27-0.8 MG 1 TABLET: 27-0.8 TAB at 09:13

## 2020-09-04 RX ADMIN — IBUPROFEN 600 MG: 600 TABLET, FILM COATED ORAL at 09:13

## 2020-09-04 RX ADMIN — HUMAN RHO(D) IMMUNE GLOBULIN 1500 UNITS: 300 INJECTION, SOLUTION INTRAMUSCULAR at 15:34

## 2020-09-04 RX ADMIN — ACETAMINOPHEN 1000 MG: 500 TABLET ORAL at 13:11

## 2020-09-04 RX ADMIN — FERROUS SULFATE TAB EC 324 MG (65 MG FE EQUIVALENT) 324 MG: 324 (65 FE) TABLET DELAYED RESPONSE at 09:14

## 2020-09-04 NOTE — DISCHARGE INSTR - ACTIVITY
"Notify Dr of... heavy bleeding, passing of clots, foul odor to your discharge, temperature above 100.4, burning when urinating, gapping or drainage from laceration, or for pain not relieved by taking pain medication, redness or streaking in breasts, pain or redness in legs.    Take all medications as prescribed.  Take rest periods several times during the day.  \"Baby Blues\" are normal and may be present around the 3rd-4th day after delivery. If they last longer than 2-3 days, please let your Dr know.  Pelvic rest for 6 weeks. No douching, tampons, or intercourse  No driving while taking pain medication  No lifting anything heavier than the baby for 2 weeks  Wear a good supportive bra 24 hours/day to prevent engorgement  "

## 2020-09-04 NOTE — PLAN OF CARE
Problem: Patient Care Overview  Goal: Plan of Care Review  Outcome: Ongoing (interventions implemented as appropriate)  Flowsheets  Taken 9/4/2020 0459 by Sofie Toribio, RN  Progress: improving  Taken 9/3/2020 0913 by Ginna Wu, RN  Plan of Care Reviewed With: patient;spouse  Taken 9/3/2020 0454 by Antoinette Carlin, RN  Outcome Summary: VSS, lochia light, small clot occassionally, ambulating in room, pain controlled with PRN meds

## 2020-09-04 NOTE — DISCHARGE SUMMARY
[facility]  Nirmala Tamayo  : 1995  MRN: [id number]  CSN: [id number]    Discharge Summary:    Date of Admission: 2020  Date of Discharge:  2020    Admitting Diagnosis:  1. Intrauterine pregnancy @ 37w1d  2. for induction of labor  3 gestational hypertension     Discharge Diagnosis:  1. Status post        History and Hospital Course:  Patient is a   who presents for induction of labor.  Her pregnancy was complicated by gestational hypertension.  Please see History and Physical for full details.       She was admitted and progressed in labor with epidural analgesia to completely dilated. She had a vaginal delivery of a  viable male   infant who weighed 3490 g (7 lb 11.1 oz)  and APGARs of        APGARS  One minute Five minutes Ten minutes Fifteen minutes Twenty minutes   Skin color: 1   1             Heart rate: 2   2             Grimace: 2   2              Muscle tone: 2   2              Breathin   2              Totals: 9   9              . No immediate complications were encountered. Please see procedure note for full details.      Her postpartum course has been unremarkable. She had no signs or symptoms of acute blood loss anemia. She was ambulating well, voiding without difficulty and lochia was within normal limits. She was breast feeding without difficulty. She was stable for discharge on postpartum day 2.      Precautions and instructions were discussed with her including but not limited to maintaining a regular diet at home, practicing local hygiene, pelvic rest, and signs and symptoms to report including heavy bleeding, frequent passage of clots, fainting or dizziness, foul odor of lochia, increasing pain, fever, or any other concerns.    She was asked to follow up in the office in 4 weeks.    Condition: Stable  Discharge Disposition: home  Discharge Diet: Regular  Activity at Discharge: Pelvic rest  Discharge Medications:       Discharge Medications      New  Medications      Instructions Start Date   ibuprofen 600 MG tablet  Commonly known as:  ADVIL,MOTRIN   600 mg, Oral, Every 8 Hours Scheduled         Continue These Medications      Instructions Start Date   albuterol sulfate  (90 Base) MCG/ACT inhaler  Commonly known as:  PROVENTIL HFA;VENTOLIN HFA;PROAIR HFA   2 puffs, Inhalation, Every 4 Hours PRN      docusate sodium 100 MG capsule  Commonly known as:  COLACE   100 mg, Oral, 2 Times Daily      EpiPen 2-Augusto 0.3 MG/0.3ML solution auto-injector injection  Generic drug:  EPINEPHrine   as needed.      mupirocin 2 % ointment  Commonly known as:  Bactroban   Topical, 3 Times Daily      polyethylene glycol packet  Commonly known as:  MIRALAX   17 g, Oral, Daily      Prenatal Vitamins 0.8 MG tablet   1 tablet, Oral, Daily         To be seen in office in follow-up in 2 weeks  Patient will restart all home medications including prenatal vitamins.        This document has been electronically signed by Tyler Lock MD on September 4, 2020 08:09

## 2020-09-08 ENCOUNTER — TELEPHONE (OUTPATIENT)
Dept: OBSTETRICS AND GYNECOLOGY | Facility: CLINIC | Age: 25
End: 2020-09-08

## 2020-09-09 LAB
LAB AP CASE REPORT: NORMAL
PATH REPORT.FINAL DX SPEC: NORMAL

## 2020-09-10 ENCOUNTER — POSTPARTUM VISIT (OUTPATIENT)
Dept: OBSTETRICS AND GYNECOLOGY | Facility: CLINIC | Age: 25
End: 2020-09-10

## 2020-09-10 VITALS
DIASTOLIC BLOOD PRESSURE: 80 MMHG | WEIGHT: 180 LBS | HEIGHT: 69 IN | BODY MASS INDEX: 26.66 KG/M2 | SYSTOLIC BLOOD PRESSURE: 120 MMHG

## 2020-09-10 DIAGNOSIS — O13.3 GESTATIONAL HYPERTENSION, THIRD TRIMESTER: Primary | ICD-10-CM

## 2020-09-10 PROCEDURE — 0503F POSTPARTUM CARE VISIT: CPT | Performed by: OBSTETRICS & GYNECOLOGY

## 2020-09-10 RX ORDER — HYDROCODONE BITARTRATE AND ACETAMINOPHEN 5; 325 MG/1; MG/1
1 TABLET ORAL EVERY 6 HOURS PRN
Qty: 15 TABLET | Refills: 0 | Status: SHIPPED | OUTPATIENT
Start: 2020-09-10 | End: 2020-09-17

## 2020-09-10 NOTE — PROGRESS NOTES
Nirmala Tamayo is a 25 y.o. y/o female.     Chief Complaint: Postpartum follow-up    HPI:   25 y.o. .  Patient's last menstrual period was 2019 (approximate)..  Patient is postpartum is complains of crampy pain at worst 6 of 10 not totally relieved by ibuprofen.  I am going to give her prescription for some hydrocodone.  Reyes obtained.  I told her to take this and can junction with 600 of ibuprofen every 6 hours.          Review of Systems     Constitutional: Denies night sweats    HENT: No hearing changes, denies ear pain    Eye: No eye pain; no foreign body in eye    Pulmonary: No hemoptysis    Cardiovascular: No claudication    GI: No hematemesis    Musculoskeletal: No arthralgias, no joint swelling    Endocrine: No polydipsia or polyuria    Hematologic: Denies any free bleeding    Psychiatric: Denies any delusions    The following portions of the patient's history were reviewed and updated as appropriate: allergies, current medications, past family history, past medical history, past social history, past surgical history and problem list.    Allergies   Allergen Reactions   • Banana Angioedema and Hives     Itchy throat   • Latex Hives and Angioedema        Outpatient Medications Prior to Visit   Medication Sig Dispense Refill   • albuterol sulfate  (90 Base) MCG/ACT inhaler Inhale 2 puffs Every 4 (Four) Hours As Needed for Wheezing. 1 inhaler 5   • docusate sodium (COLACE) 100 MG capsule Take 100 mg by mouth 2 (Two) Times a Day.     • EPINEPHrine (EPIPEN 2-EMILIANA) 0.3 MG/0.3ML solution auto-injector injection as needed.     • ibuprofen (ADVIL,MOTRIN) 600 MG tablet Take 1 tablet by mouth Every 8 (Eight) Hours for 30 days. 90 tablet 0   • polyethylene glycol (MIRALAX) packet Take 17 g by mouth Daily.     • Prenatal Multivit-Min-Fe-FA (PRENATAL VITAMINS) 0.8 MG tablet Take 1 tablet by mouth Daily. 30 tablet 11   • mupirocin (Bactroban) 2 % ointment Apply  topically to the appropriate  area as directed 3 (Three) Times a Day. 30 g 1     No facility-administered medications prior to visit.         The patient has a family history of   Family History   Problem Relation Age of Onset   • Heart disease Mother    • Hypertension Mother    • Hyperthyroidism Mother    • Kidney failure Sister    • Heart failure Sister    • COPD Sister    • Cancer Other    • Diabetes Other    • Hypertension Other    • Stroke Other    • Thyroid disease Other    • Gallbladder disease Other         Gallstones   • Cervical cancer Maternal Grandmother    • Diabetes Maternal Grandmother    • No Known Problems Son    • Febrile seizures Daughter    • COPD Father    • Diabetes Paternal Grandmother    • Cirrhosis Paternal Grandmother    • Parkinsonism Maternal Grandfather    • Heart disease Maternal Grandfather    • Hypothyroidism Sister    • No Known Problems Son         Past Medical History:   Diagnosis Date   • Abnormal Pap smear of cervix    • Acute allergic reaction    • Acute bronchitis    • Acute pharyngitis    • Agoraphobia with panic attacks    • Allergic rhinitis    • Anemia    • Anxiety    • Anxiety state    • Asthma     Stable   • Back strain    • Constipation    • Cough    • Disturbance of skin sensation    • Gestational hypertension     with second pregnancy   • Headache    • History of transfusion     after second delivery   • HPV (human papilloma virus) infection    • Hx of preeclampsia, prior pregnancy, currently pregnant 1/2/2018   • Irregular periods    • Irritable bowel syndrome with constipation    • Low back pain    • Lumbosacral dysfunction    • Neck pain    • Palpitations    • Preeclampsia    • Rh negative state in antepartum period, third trimester 12/7/2017   • Rhinitis    • Severe depression (CMS/HCC)    • Spasm     cervical spasm   • Spinal headache    • Upper respiratory infection    • Urinary tract infection 7/14/2017   • Vaginal irritation    • Vitreous floaters     prob, not seen on exam   • Wheezing      "    OB History        4    Para   4    Term   3       1    AB        Living   4       SAB        TAB        Ectopic        Molar        Multiple   0    Live Births   4                 Social History     Socioeconomic History   • Marital status:      Spouse name: Not on file   • Number of children: Not on file   • Years of education: Not on file   • Highest education level: Not on file   Tobacco Use   • Smoking status: Never Smoker   • Smokeless tobacco: Never Used   Substance and Sexual Activity   • Alcohol use: No   • Drug use: No   • Sexual activity: Yes     Partners: Male     Birth control/protection: None     Comment: last pap smear 19 negative         Past Surgical History:   Procedure Laterality Date   • PROCEDURE GENERIC CONVERTED  2016    Physical Therapy Consult   • WISDOM TOOTH EXTRACTION          Patient Active Problem List   Diagnosis   • Sore throat   • Anxiety   • Fever   • Low back pain with radiation   • Lumbar disc disorder   • Irritable bowel syndrome with constipation   • Epigastric pain   • Acute upper respiratory infection   • Left otitis media   • Acne vulgaris   • Encounter for surveillance of contraceptive pills   • Acute bronchiolitis   • Cough   • Threatened    • 9 weeks gestation of pregnancy   • Urinary frequency   • 15 weeks gestation of pregnancy   • Rh negative status during pregnancy in second trimester   • Headache   • 18 weeks gestation of pregnancy   • Encounter for  screening for malformation using ultrasound   • Multigravida   •  uterine contractions, antepartum   • 25 weeks gestation of pregnancy   • Fetal size inconsistent with dates   • Macrosomia of fetus affecting management of mother in third trimester   • Gestational hypertension, third trimester   • Gestational hypertension   • Third-stage postpartum hemorrhage        Documented Vitals    09/10/20 0833   BP: 120/80   Weight: 81.6 kg (180 lb)   Height: 175.3 cm (69\") "   PainSc:   2        Body mass index is 26.58 kg/m².    Physical Exam  Constitutional: Appears to be in no acute distress; Eyes: sclera normal; Endocrine system: thyroid palpate is normal; Pulmonary system: lungs clear; Cardiovascular system: heart regular rate and rhythm; Gastrointestinal system: abdomen soft nontender uterus is firm U- 3 nontender, active bowel sounds; Urologic system: CVA negative; Psychiatric: appropriate insight; Neurologic: gait within normal limit    Laboratory Data:   Lab Results - Last 18 Months   Lab Units 09/02/20  1543 08/31/20  1417 08/19/20  1258 07/31/20  1444 04/21/20  0741   GLUCOSE mg/dL 73 82 68 84 119*   BUN mg/dL 5* 8 7 7 6   CREATININE mg/dL 0.59 0.65 0.58 0.49* 0.57   SODIUM mmol/L 140 138 136 138 134*   POTASSIUM mmol/L 4.0 3.8 3.6 3.8 3.9   CHLORIDE mmol/L 105 102 100 103 99   CO2 mmol/L 22.0 24.0 23.0 22.0 25.6   CALCIUM mg/dL 8.6 9.2 9.0 8.6 9.2   TOTAL PROTEIN g/dL 5.8* 6.5 6.8 6.2 6.6   ALBUMIN g/dL 3.30* 3.60 3.90 3.40* 3.70   ALT (SGPT) U/L 11 13 10 9 25   AST (SGOT) U/L 18 17 17 14 23   ALK PHOS U/L 136* 154* 138* 97 46   BILIRUBIN mg/dL 0.3 0.2 0.2 <0.2 0.2   EGFR IF NONAFRICN AM mL/min/1.73 124 111 127 >150 129   GLOBULIN gm/dL 2.5 2.9 2.9 2.8 2.9   A/G RATIO g/dL 1.3 1.2 1.3 1.2 1.3   BUN / CREAT RATIO  8.5 12.3 12.1 14.3 10.5   ANION GAP mmol/L 13.0 12.0 13.0 13.0 9.4     Lab Results - Last 18 Months   Lab Units 09/03/20  0545 09/02/20  1543 09/02/20  0834 08/31/20  1417 08/19/20  1258   WBC 10*3/mm3 9.27 16.78* 9.14 8.43 11.32*   RBC 10*6/mm3 3.85 4.10 4.19 4.52 4.56   HEMOGLOBIN g/dL 11.3* 12.4 12.3 13.5 13.6   HEMATOCRIT % 34.3 36.1 37.4 40.0 39.8   MCV fL 89.1 88.0 89.3 88.5 87.3   MCH pg 29.4 30.2 29.4 29.9 29.8   MCHC g/dL 32.9 34.3 32.9 33.8 34.2   RDW % 13.9 14.0 14.0 14.1 13.9   RDW-SD fl 45.0 44.7 45.2 44.6 43.4   MPV fL 11.4 11.2 11.2 11.0 10.7   PLATELETS 10*3/mm3 124* 130* 126* 151 148     No results for input(s): HCGQUAL in the last 01802  hours.    Assessment        Diagnosis Plan   1. Postpartum state  HYDROcodone-acetaminophen (Norco) 5-325 MG per tablet         Plan         New Medications Ordered This Visit   Medications   • HYDROcodone-acetaminophen (Norco) 5-325 MG per tablet     Sig: Take 1 tablet by mouth Every 6 (Six) Hours As Needed for Severe Pain .     Dispense:  15 tablet     Refill:  0             This document has been electronically signed by Tyler Lock MD on September 10, 2020 09:57    Please note that portions of this note were completed with a voice recognition program.

## 2020-09-15 DIAGNOSIS — O13.3 GESTATIONAL HYPERTENSION, THIRD TRIMESTER: ICD-10-CM

## 2020-09-17 ENCOUNTER — POSTPARTUM VISIT (OUTPATIENT)
Dept: OBSTETRICS AND GYNECOLOGY | Facility: CLINIC | Age: 25
End: 2020-09-17

## 2020-09-17 VITALS
DIASTOLIC BLOOD PRESSURE: 64 MMHG | BODY MASS INDEX: 26.69 KG/M2 | SYSTOLIC BLOOD PRESSURE: 106 MMHG | WEIGHT: 180.2 LBS | HEIGHT: 69 IN

## 2020-09-17 PROCEDURE — 0503F POSTPARTUM CARE VISIT: CPT | Performed by: OBSTETRICS & GYNECOLOGY

## 2020-09-17 RX ORDER — FERROUS SULFATE 325(65) MG
325 TABLET ORAL
COMMUNITY
End: 2021-02-25

## 2020-09-19 NOTE — PROGRESS NOTES
Nirmala Tamayo is a 25 y.o. y/o female.     Chief Complaint: Postpartum follow-up    HPI:   25 y.o. .  Patient's last menstrual period was 2019 (approximate)..  Patient is doing well her pain is under good control no headaches visual changes epigastric pain blood pressure is good today and she says is been good at home          Review of Systems     Constitutional: Denies night sweats    HENT: No hearing changes, denies ear pain    Eye: No eye pain; no foreign body in eye    Pulmonary: No hemoptysis    Cardiovascular: No claudication    GI: No hematemesis    Musculoskeletal: No arthralgias, no joint swelling    Endocrine: No polydipsia or polyuria    Hematologic: Denies any free bleeding    Psychiatric: Denies any delusions    The following portions of the patient's history were reviewed and updated as appropriate: allergies, current medications, past family history, past medical history, past social history, past surgical history and problem list.    Allergies   Allergen Reactions   • Banana Angioedema and Hives     Itchy throat   • Latex Hives and Angioedema        Outpatient Medications Prior to Visit   Medication Sig Dispense Refill   • albuterol sulfate  (90 Base) MCG/ACT inhaler Inhale 2 puffs Every 4 (Four) Hours As Needed for Wheezing. 1 inhaler 5   • docusate sodium (COLACE) 100 MG capsule Take 100 mg by mouth 2 (Two) Times a Day.     • EPINEPHrine (EPIPEN 2-EMILIANA) 0.3 MG/0.3ML solution auto-injector injection as needed.     • ferrous sulfate 325 (65 FE) MG tablet Take 325 mg by mouth Daily With Breakfast.     • polyethylene glycol (MIRALAX) packet Take 17 g by mouth Daily.     • Prenatal Multivit-Min-Fe-FA (PRENATAL VITAMINS) 0.8 MG tablet Take 1 tablet by mouth Daily. 30 tablet 11   • HYDROcodone-acetaminophen (Norco) 5-325 MG per tablet Take 1 tablet by mouth Every 6 (Six) Hours As Needed for Severe Pain . 15 tablet 0   • ibuprofen (ADVIL,MOTRIN) 600 MG tablet Take 1 tablet by  mouth Every 8 (Eight) Hours for 30 days. 90 tablet 0   • mupirocin (Bactroban) 2 % ointment Apply  topically to the appropriate area as directed 3 (Three) Times a Day. 30 g 1     No facility-administered medications prior to visit.         The patient has a family history of   Family History   Problem Relation Age of Onset   • Heart disease Mother    • Hypertension Mother    • Hyperthyroidism Mother    • Kidney failure Sister    • Heart failure Sister    • COPD Sister    • Cancer Other    • Diabetes Other    • Hypertension Other    • Stroke Other    • Thyroid disease Other    • Gallbladder disease Other         Gallstones   • Cervical cancer Maternal Grandmother    • Diabetes Maternal Grandmother    • No Known Problems Son    • Febrile seizures Daughter    • COPD Father    • Diabetes Paternal Grandmother    • Cirrhosis Paternal Grandmother    • Parkinsonism Maternal Grandfather    • Heart disease Maternal Grandfather    • Hypothyroidism Sister    • No Known Problems Son         Past Medical History:   Diagnosis Date   • Abnormal Pap smear of cervix    • Acute allergic reaction    • Acute bronchitis    • Acute pharyngitis    • Agoraphobia with panic attacks    • Allergic rhinitis    • Anemia    • Anxiety    • Anxiety state    • Asthma     Stable   • Back strain    • Constipation    • Cough    • Disturbance of skin sensation    • Gestational hypertension     with second pregnancy   • Headache    • History of transfusion     after second delivery   • HPV (human papilloma virus) infection    • Hx of preeclampsia, prior pregnancy, currently pregnant 1/2/2018   • Irregular periods    • Irritable bowel syndrome with constipation    • Low back pain    • Lumbosacral dysfunction    • Neck pain    • Palpitations    • Preeclampsia    • Rh negative state in antepartum period, third trimester 12/7/2017   • Rhinitis    • Severe depression (CMS/HCC)    • Spasm     cervical spasm   • Spinal headache    • Upper respiratory infection     • Urinary tract infection 2017   • Vaginal irritation    • Vitreous floaters     prob, not seen on exam   • Wheezing         OB History        4    Para   4    Term   3       1    AB        Living   4       SAB        TAB        Ectopic        Molar        Multiple   0    Live Births   4                 Social History     Socioeconomic History   • Marital status:      Spouse name: Not on file   • Number of children: Not on file   • Years of education: Not on file   • Highest education level: Not on file   Tobacco Use   • Smoking status: Never Smoker   • Smokeless tobacco: Never Used   Substance and Sexual Activity   • Alcohol use: No   • Drug use: No   • Sexual activity: Yes     Partners: Male     Birth control/protection: None     Comment: last pap smear 19 negative         Past Surgical History:   Procedure Laterality Date   • PROCEDURE GENERIC CONVERTED  2016    Physical Therapy Consult   • WISDOM TOOTH EXTRACTION          Patient Active Problem List   Diagnosis   • Sore throat   • Anxiety   • Fever   • Low back pain with radiation   • Lumbar disc disorder   • Irritable bowel syndrome with constipation   • Epigastric pain   • Acute upper respiratory infection   • Left otitis media   • Acne vulgaris   • Encounter for surveillance of contraceptive pills   • Acute bronchiolitis   • Cough   • Threatened    • 9 weeks gestation of pregnancy   • Urinary frequency   • 15 weeks gestation of pregnancy   • Rh negative status during pregnancy in second trimester   • Headache   • 18 weeks gestation of pregnancy   • Encounter for  screening for malformation using ultrasound   • Multigravida   •  uterine contractions, antepartum   • 25 weeks gestation of pregnancy   • Fetal size inconsistent with dates   • Macrosomia of fetus affecting management of mother in third trimester   • Gestational hypertension, third trimester   • Gestational hypertension   • Third-stage  "postpartum hemorrhage        Documented Vitals    09/17/20 1606   BP: 106/64   Weight: 81.7 kg (180 lb 3.2 oz)   Height: 175.3 cm (69\")   PainSc: 0-No pain        Body mass index is 26.61 kg/m².    Physical Exam  Constitutional: Appears to be in no acute distress; Eyes: sclera normal; Endocrine system: thyroid palpate is normal; Pulmonary system: lungs clear; Cardiovascular system: heart regular rate and rhythm; Gastrointestinal system: abdomen soft nontender, active bowel sounds; Urologic system: CVA negative; Psychiatric: appropriate insight; Neurologic: gait within normal limits DTRs 2+/4 no clonus    Laboratory Data:   Lab Results - Last 18 Months   Lab Units 09/02/20  1543 08/31/20  1417 08/19/20  1258 07/31/20  1444 04/21/20  0741   GLUCOSE mg/dL 73 82 68 84 119*   BUN mg/dL 5* 8 7 7 6   CREATININE mg/dL 0.59 0.65 0.58 0.49* 0.57   SODIUM mmol/L 140 138 136 138 134*   POTASSIUM mmol/L 4.0 3.8 3.6 3.8 3.9   CHLORIDE mmol/L 105 102 100 103 99   CO2 mmol/L 22.0 24.0 23.0 22.0 25.6   CALCIUM mg/dL 8.6 9.2 9.0 8.6 9.2   TOTAL PROTEIN g/dL 5.8* 6.5 6.8 6.2 6.6   ALBUMIN g/dL 3.30* 3.60 3.90 3.40* 3.70   ALT (SGPT) U/L 11 13 10 9 25   AST (SGOT) U/L 18 17 17 14 23   ALK PHOS U/L 136* 154* 138* 97 46   BILIRUBIN mg/dL 0.3 0.2 0.2 <0.2 0.2   EGFR IF NONAFRICN AM mL/min/1.73 124 111 127 >150 129   GLOBULIN gm/dL 2.5 2.9 2.9 2.8 2.9   A/G RATIO g/dL 1.3 1.2 1.3 1.2 1.3   BUN / CREAT RATIO  8.5 12.3 12.1 14.3 10.5   ANION GAP mmol/L 13.0 12.0 13.0 13.0 9.4     Lab Results - Last 18 Months   Lab Units 09/03/20  0545 09/02/20  1543 09/02/20  0834 08/31/20  1417 08/19/20  1258   WBC 10*3/mm3 9.27 16.78* 9.14 8.43 11.32*   RBC 10*6/mm3 3.85 4.10 4.19 4.52 4.56   HEMOGLOBIN g/dL 11.3* 12.4 12.3 13.5 13.6   HEMATOCRIT % 34.3 36.1 37.4 40.0 39.8   MCV fL 89.1 88.0 89.3 88.5 87.3   MCH pg 29.4 30.2 29.4 29.9 29.8   MCHC g/dL 32.9 34.3 32.9 33.8 34.2   RDW % 13.9 14.0 14.0 14.1 13.9   RDW-SD fl 45.0 44.7 45.2 44.6 43.4   MPV fL " 11.4 11.2 11.2 11.0 10.7   PLATELETS 10*3/mm3 124* 130* 126* 151 148     No results for input(s): HCGQUAL in the last [id number] hours.    Assessment       No diagnosis found.      Plan       No orders of the defined types were placed in this encounter.    Discussed contraceptive options        This document has been electronically signed by Tyler Lock MD on September 19, 2020 16:33 CDT    Please note that portions of this note were completed with a voice recognition program.

## 2020-10-09 ENCOUNTER — FLU SHOT (OUTPATIENT)
Dept: FAMILY MEDICINE CLINIC | Facility: CLINIC | Age: 25
End: 2020-10-09

## 2020-10-09 DIAGNOSIS — Z23 NEED FOR INFLUENZA VACCINATION: ICD-10-CM

## 2020-10-09 PROCEDURE — 90686 IIV4 VACC NO PRSV 0.5 ML IM: CPT | Performed by: FAMILY MEDICINE

## 2020-10-09 PROCEDURE — 90471 IMMUNIZATION ADMIN: CPT | Performed by: FAMILY MEDICINE

## 2020-10-14 ENCOUNTER — TELEMEDICINE (OUTPATIENT)
Dept: OBSTETRICS AND GYNECOLOGY | Facility: CLINIC | Age: 25
End: 2020-10-14

## 2020-10-14 PROCEDURE — 0503F POSTPARTUM CARE VISIT: CPT | Performed by: OBSTETRICS & GYNECOLOGY

## 2020-10-16 NOTE — PROGRESS NOTES
You have chosen to receive care through a telephone visit. Do you consent to use a telephone visit for your medical care today? Yes  This visit has been rescheduled as a phone visit to comply with patient safety concerns in accordance with CDC recommendations. Total time of discussion was 9 minutes.      Chief complaint postpartum    Patient postpartum doing well no complaints no headaches visual changes epigastric pain checking blood pressure at home.    Discussed contraceptive options is going to consider use barrier contraceptive for now with maximum mild let me know

## 2020-12-09 ENCOUNTER — OFFICE VISIT (OUTPATIENT)
Dept: FAMILY MEDICINE CLINIC | Facility: CLINIC | Age: 25
End: 2020-12-09

## 2020-12-09 ENCOUNTER — LAB (OUTPATIENT)
Dept: LAB | Facility: HOSPITAL | Age: 25
End: 2020-12-09

## 2020-12-09 VITALS
HEART RATE: 73 BPM | DIASTOLIC BLOOD PRESSURE: 64 MMHG | SYSTOLIC BLOOD PRESSURE: 122 MMHG | BODY MASS INDEX: 26.53 KG/M2 | TEMPERATURE: 98 F | HEIGHT: 69 IN | OXYGEN SATURATION: 99 % | WEIGHT: 179.1 LBS

## 2020-12-09 DIAGNOSIS — R53.83 MALAISE AND FATIGUE: ICD-10-CM

## 2020-12-09 DIAGNOSIS — R53.81 MALAISE AND FATIGUE: ICD-10-CM

## 2020-12-09 DIAGNOSIS — K58.1 IRRITABLE BOWEL SYNDROME WITH CONSTIPATION: Primary | ICD-10-CM

## 2020-12-09 DIAGNOSIS — Z00.00 GENERAL MEDICAL EXAM: ICD-10-CM

## 2020-12-09 LAB
25(OH)D3 SERPL-MCNC: 28.7 NG/ML (ref 30–100)
ALBUMIN SERPL-MCNC: 4.5 G/DL (ref 3.5–5.2)
ALBUMIN/GLOB SERPL: 1.5 G/DL
ALP SERPL-CCNC: 80 U/L (ref 39–117)
ALT SERPL W P-5'-P-CCNC: 20 U/L (ref 1–33)
ANION GAP SERPL CALCULATED.3IONS-SCNC: 11.3 MMOL/L (ref 5–15)
AST SERPL-CCNC: 20 U/L (ref 1–32)
BASOPHILS # BLD AUTO: 0.04 10*3/MM3 (ref 0–0.2)
BASOPHILS NFR BLD AUTO: 0.7 % (ref 0–1.5)
BILIRUB SERPL-MCNC: 0.2 MG/DL (ref 0–1.2)
BUN SERPL-MCNC: 13 MG/DL (ref 6–20)
BUN/CREAT SERPL: 24.1 (ref 7–25)
CALCIUM SPEC-SCNC: 9.4 MG/DL (ref 8.6–10.5)
CHLORIDE SERPL-SCNC: 103 MMOL/L (ref 98–107)
CO2 SERPL-SCNC: 23.7 MMOL/L (ref 22–29)
CREAT SERPL-MCNC: 0.54 MG/DL (ref 0.57–1)
DEPRECATED RDW RBC AUTO: 39.8 FL (ref 37–54)
EOSINOPHIL # BLD AUTO: 0.16 10*3/MM3 (ref 0–0.4)
EOSINOPHIL NFR BLD AUTO: 2.6 % (ref 0.3–6.2)
ERYTHROCYTE [DISTWIDTH] IN BLOOD BY AUTOMATED COUNT: 13 % (ref 12.3–15.4)
GFR SERPL CREATININE-BSD FRML MDRD: 138 ML/MIN/1.73
GLOBULIN UR ELPH-MCNC: 3.1 GM/DL
GLUCOSE SERPL-MCNC: 97 MG/DL (ref 65–99)
HCT VFR BLD AUTO: 39.4 % (ref 34–46.6)
HGB BLD-MCNC: 13 G/DL (ref 12–15.9)
IMM GRANULOCYTES # BLD AUTO: 0.02 10*3/MM3 (ref 0–0.05)
IMM GRANULOCYTES NFR BLD AUTO: 0.3 % (ref 0–0.5)
IRON 24H UR-MRATE: 48 MCG/DL (ref 37–145)
LYMPHOCYTES # BLD AUTO: 1.75 10*3/MM3 (ref 0.7–3.1)
LYMPHOCYTES NFR BLD AUTO: 28.9 % (ref 19.6–45.3)
MAGNESIUM SERPL-MCNC: 2.1 MG/DL (ref 1.6–2.6)
MCH RBC QN AUTO: 27.9 PG (ref 26.6–33)
MCHC RBC AUTO-ENTMCNC: 33 G/DL (ref 31.5–35.7)
MCV RBC AUTO: 84.5 FL (ref 79–97)
MONOCYTES # BLD AUTO: 0.49 10*3/MM3 (ref 0.1–0.9)
MONOCYTES NFR BLD AUTO: 8.1 % (ref 5–12)
NEUTROPHILS NFR BLD AUTO: 3.59 10*3/MM3 (ref 1.7–7)
NEUTROPHILS NFR BLD AUTO: 59.4 % (ref 42.7–76)
NRBC BLD AUTO-RTO: 0 /100 WBC (ref 0–0.2)
PLATELET # BLD AUTO: 227 10*3/MM3 (ref 140–450)
PMV BLD AUTO: 10.6 FL (ref 6–12)
POTASSIUM SERPL-SCNC: 4 MMOL/L (ref 3.5–5.2)
PROT SERPL-MCNC: 7.6 G/DL (ref 6–8.5)
RBC # BLD AUTO: 4.66 10*6/MM3 (ref 3.77–5.28)
SODIUM SERPL-SCNC: 138 MMOL/L (ref 136–145)
TSH SERPL DL<=0.05 MIU/L-ACNC: 1.26 UIU/ML (ref 0.27–4.2)
VIT B12 BLD-MCNC: 471 PG/ML (ref 211–946)
WBC # BLD AUTO: 6.05 10*3/MM3 (ref 3.4–10.8)

## 2020-12-09 PROCEDURE — 83735 ASSAY OF MAGNESIUM: CPT | Performed by: NURSE PRACTITIONER

## 2020-12-09 PROCEDURE — 82607 VITAMIN B-12: CPT | Performed by: NURSE PRACTITIONER

## 2020-12-09 PROCEDURE — 36415 COLL VENOUS BLD VENIPUNCTURE: CPT | Performed by: NURSE PRACTITIONER

## 2020-12-09 PROCEDURE — 84443 ASSAY THYROID STIM HORMONE: CPT | Performed by: NURSE PRACTITIONER

## 2020-12-09 PROCEDURE — 86769 SARS-COV-2 COVID-19 ANTIBODY: CPT | Performed by: NURSE PRACTITIONER

## 2020-12-09 PROCEDURE — 83540 ASSAY OF IRON: CPT | Performed by: NURSE PRACTITIONER

## 2020-12-09 PROCEDURE — 82306 VITAMIN D 25 HYDROXY: CPT | Performed by: NURSE PRACTITIONER

## 2020-12-09 PROCEDURE — 80053 COMPREHEN METABOLIC PANEL: CPT | Performed by: NURSE PRACTITIONER

## 2020-12-09 PROCEDURE — 85025 COMPLETE CBC W/AUTO DIFF WBC: CPT | Performed by: NURSE PRACTITIONER

## 2020-12-09 PROCEDURE — 99213 OFFICE O/P EST LOW 20 MIN: CPT | Performed by: NURSE PRACTITIONER

## 2020-12-09 NOTE — PROGRESS NOTES
Chief Complaint   Patient presents with   • Follow-up     Subjective   Nirmala Tamayo is a 25 y.o. female.     Constipation  This is a chronic problem. The current episode started more than 1 month ago. The problem has been gradually worsening since onset. The stool is described as pellet like, formed and watery. The patient is on a high fiber diet. She exercises regularly. There has been adequate water intake. Associated symptoms include back pain, bloating and nausea. Pertinent negatives include no abdominal pain, anorexia, diarrhea, difficulty urinating, fecal incontinence, fever, flatus, hematochezia, hemorrhoids, melena, rectal pain, vomiting or weight loss. She has tried stool softeners, laxatives, fiber, enemas and diet changes for the symptoms. The treatment provided mild relief. There is no history of abdominal surgery, endocrine disease, inflammatory bowel disease, irritable bowel syndrome, metabolic disease, neurologic disease, neuromuscular disease, psychiatric history or radiation treatment.   Fatigue  Associated symptoms include arthralgias, diaphoresis, fatigue and nausea. Pertinent negatives include no abdominal pain, anorexia, chest pain, chills, congestion, coughing, fever, headaches, joint swelling, myalgias, neck pain, numbness, rash or vomiting.        The following portions of the patient's history were reviewed and updated as appropriate: allergies, current medications, past social history and problem list.    Review of Systems   Constitutional: Positive for activity change, diaphoresis, fatigue and unexpected weight change. Negative for appetite change, chills, fever and weight loss.   HENT: Negative.  Negative for congestion, dental problem, drooling, ear discharge, ear pain, facial swelling, hearing loss, mouth sores, nosebleeds and postnasal drip.    Eyes: Negative.  Negative for photophobia, pain, discharge, redness, itching and visual disturbance.   Respiratory: Negative.   "Negative for apnea, cough, choking, chest tightness, shortness of breath, wheezing and stridor.    Cardiovascular: Negative.  Negative for chest pain, palpitations and leg swelling.   Gastrointestinal: Positive for abdominal distention, bloating, constipation and nausea. Negative for abdominal pain, anal bleeding, anorexia, blood in stool, diarrhea, flatus, hematochezia, hemorrhoids, melena, rectal pain and vomiting.        Constipation chronic-has used colace, miralax, laxatives and nothing is helping-has used linzess in the past and it helped    Endocrine: Negative.  Negative for heat intolerance, polydipsia, polyphagia and polyuria.   Genitourinary: Negative.  Negative for difficulty urinating.   Musculoskeletal: Positive for arthralgias, back pain and gait problem. Negative for joint swelling, myalgias, neck pain and neck stiffness.   Skin: Negative.  Negative for color change, pallor and rash.   Allergic/Immunologic: Negative.  Negative for environmental allergies, food allergies and immunocompromised state.   Neurological: Negative for dizziness, seizures, facial asymmetry, light-headedness, numbness and headaches.   Hematological: Negative.  Negative for adenopathy. Does not bruise/bleed easily.   Psychiatric/Behavioral: Positive for agitation and sleep disturbance. Negative for behavioral problems, confusion, decreased concentration, dysphoric mood, hallucinations, self-injury and suicidal ideas. The patient is nervous/anxious. The patient is not hyperactive.         Has 4 children  Under the age of 5         Objective   /64   Pulse 73   Temp 98 °F (36.7 °C)   Ht 175.3 cm (69\")   Wt 81.2 kg (179 lb 1.6 oz)   SpO2 99%   BMI 26.45 kg/m²   Physical Exam  Vitals signs and nursing note reviewed.   Constitutional:       Appearance: Normal appearance.   HENT:      Head: Normocephalic.      Right Ear: Tympanic membrane normal.      Left Ear: Tympanic membrane normal.      Nose: Nose normal.      " Mouth/Throat:      Mouth: Mucous membranes are dry.   Eyes:      General: No scleral icterus.        Right eye: No discharge.         Left eye: No discharge.      Extraocular Movements: Extraocular movements intact.      Pupils: Pupils are equal, round, and reactive to light.   Neck:      Musculoskeletal: Normal range of motion.   Cardiovascular:      Rate and Rhythm: Normal rate.      Pulses: Normal pulses.      Heart sounds: Normal heart sounds. No murmur. No friction rub. No gallop.    Pulmonary:      Effort: Pulmonary effort is normal. No respiratory distress.      Breath sounds: Normal breath sounds. No stridor. No wheezing or rhonchi.   Abdominal:      General: Abdomen is flat. There is no distension.      Palpations: Abdomen is soft. There is no mass.      Tenderness: There is no abdominal tenderness. There is no right CVA tenderness, left CVA tenderness, guarding or rebound.      Hernia: No hernia is present.   Musculoskeletal: Normal range of motion.   Skin:     General: Skin is warm and dry.      Coloration: Skin is not jaundiced or pale.      Findings: No bruising, erythema, lesion or rash.   Neurological:      General: No focal deficit present.      Mental Status: She is alert and oriented to person, place, and time. Mental status is at baseline.      Cranial Nerves: No cranial nerve deficit.      Sensory: No sensory deficit.      Motor: No weakness.      Coordination: Coordination normal.         Assessment/Plan   Problems Addressed this Visit        Digestive    Irritable bowel syndrome with constipation - Primary (Chronic)    Relevant Orders    CBC & Differential    Comprehensive Metabolic Panel    Iron    TSH    Vitamin B12    Vitamin D 25 Hydroxy    Magnesium    SARS-CoV-2 Antibodies (Roche)    CBC Auto Differential      Other Visit Diagnoses     General medical exam        Relevant Orders    CBC & Differential    Comprehensive Metabolic Panel    Iron    TSH    Vitamin B12    Vitamin D 25 Hydroxy     Magnesium    SARS-CoV-2 Antibodies (Roche)    CBC Auto Differential    Malaise and fatigue          Diagnoses       Codes Comments    Irritable bowel syndrome with constipation    -  Primary ICD-10-CM: K58.1  ICD-9-CM: 564.1     General medical exam     ICD-10-CM: Z00.00  ICD-9-CM: V70.9     Malaise and fatigue     ICD-10-CM: R53.81, R53.83  ICD-9-CM: 780.79            New Medications Ordered This Visit   Medications   • linaclotide (Linzess) 145 MCG capsule capsule     Sig: Take 1 capsule by mouth Every Morning Before Breakfast.     Dispense:  30 capsule     Refill:  11       It's not just what you eat, but when you eat  Eat breakfast, and eat smaller meals throughout the day. A healthy breakfast can jumpstart your metabolism, while eating small, healthy meals (rather than the standard three large meals) keeps your energy up.   Avoid eating at night. Try to eat dinner earlier and fast for 14-16 hours until breakfast the next morning. Studies suggest that eating only when you’re most active and giving your digestive system a long break each day may help to regulate weight.     Patient has tried and failed on colace and will benefit from from linzess as directed, continue diet and increase fiber in diet daily as directed    meds as directed, labs as directed, follow up if worsen

## 2020-12-10 LAB — SARS-COV-2 AB SERPL QL IA: NEGATIVE

## 2021-01-28 ENCOUNTER — OFFICE VISIT (OUTPATIENT)
Dept: FAMILY MEDICINE CLINIC | Facility: CLINIC | Age: 26
End: 2021-01-28

## 2021-01-28 ENCOUNTER — TELEMEDICINE (OUTPATIENT)
Dept: FAMILY MEDICINE CLINIC | Facility: TELEHEALTH | Age: 26
End: 2021-01-28

## 2021-01-28 VITALS
SYSTOLIC BLOOD PRESSURE: 110 MMHG | HEIGHT: 69 IN | TEMPERATURE: 99 F | BODY MASS INDEX: 26.07 KG/M2 | DIASTOLIC BLOOD PRESSURE: 70 MMHG | WEIGHT: 176 LBS

## 2021-01-28 DIAGNOSIS — Z20.7: Primary | ICD-10-CM

## 2021-01-28 DIAGNOSIS — J45.30 MILD PERSISTENT ASTHMA WITHOUT COMPLICATION: Primary | ICD-10-CM

## 2021-01-28 PROCEDURE — 99213 OFFICE O/P EST LOW 20 MIN: CPT | Performed by: NURSE PRACTITIONER

## 2021-01-28 RX ORDER — ALBUTEROL SULFATE 90 UG/1
2 AEROSOL, METERED RESPIRATORY (INHALATION) EVERY 4 HOURS PRN
Qty: 18 G | Refills: 11 | Status: SHIPPED | OUTPATIENT
Start: 2021-01-28 | End: 2021-09-16 | Stop reason: SDUPTHER

## 2021-01-28 RX ORDER — MONTELUKAST SODIUM 10 MG/1
10 TABLET ORAL NIGHTLY
Qty: 30 TABLET | Refills: 11 | OUTPATIENT
Start: 2021-01-28 | End: 2021-08-05

## 2021-01-28 NOTE — PROGRESS NOTES
Chief Complaint   Patient presents with   • Check up     medication check     Subjective   Nirmala Tamayo is a 25 y.o. female.     Cough  This is a chronic problem. The current episode started more than 1 month ago. The problem has been waxing and waning. The problem occurs hourly. The cough is productive of sputum. Associated symptoms include nasal congestion, postnasal drip, rhinorrhea and wheezing. Pertinent negatives include no chest pain, chills, ear congestion, ear pain, eye redness, fever, headaches, heartburn, hemoptysis, myalgias, rash, sore throat, shortness of breath, sweats or weight loss. She has tried rest for the symptoms. The treatment provided mild relief. Her past medical history is significant for asthma. There is no history of bronchiectasis, bronchitis, COPD, emphysema, environmental allergies or pneumonia.   Asthma  She complains of cough and wheezing. There is no hemoptysis or shortness of breath. This is a recurrent problem. The current episode started more than 1 month ago. The problem occurs intermittently. The problem has been rapidly worsening. Associated symptoms include nasal congestion, postnasal drip, rhinorrhea and sneezing. Pertinent negatives include no appetite change, chest pain, ear congestion, ear pain, fever, headaches, heartburn, myalgias, sore throat, sweats or weight loss. Her past medical history is significant for asthma. There is no history of bronchiectasis, bronchitis, COPD, emphysema or pneumonia.        The following portions of the patient's history were reviewed and updated as appropriate: allergies, current medications, past social history and problem list.    Review of Systems   Constitutional: Positive for fatigue. Negative for activity change, appetite change, chills, diaphoresis, fever and weight loss.   HENT: Positive for congestion, postnasal drip, rhinorrhea and sneezing. Negative for dental problem, drooling, ear discharge, ear pain, facial  "swelling, sinus pain, sore throat and tinnitus.    Eyes: Negative.  Negative for photophobia, pain, redness and visual disturbance.   Respiratory: Positive for cough and wheezing. Negative for hemoptysis, choking, chest tightness, shortness of breath and stridor.         History of asthma controlled    Cardiovascular: Negative.  Negative for chest pain, palpitations and leg swelling.   Gastrointestinal: Negative.  Negative for abdominal distention, abdominal pain, anal bleeding and heartburn.   Endocrine: Negative.  Negative for cold intolerance, heat intolerance, polydipsia, polyphagia and polyuria.   Genitourinary: Negative.    Musculoskeletal: Negative.  Negative for arthralgias, back pain, gait problem, joint swelling, myalgias, neck pain and neck stiffness.   Skin: Negative.  Negative for color change, pallor and rash.   Allergic/Immunologic: Negative.  Negative for environmental allergies, food allergies and immunocompromised state.   Neurological: Negative.  Negative for dizziness, tremors, seizures, syncope, facial asymmetry, speech difficulty, weakness, light-headedness, numbness and headaches.   Hematological: Negative for adenopathy. Does not bruise/bleed easily.   Psychiatric/Behavioral: Negative.  Negative for agitation, behavioral problems, confusion, decreased concentration, dysphoric mood and hallucinations. The patient is not nervous/anxious and is not hyperactive.        Objective   /70   Temp 99 °F (37.2 °C) (Tympanic)   Ht 175.3 cm (69\")   Wt 79.8 kg (176 lb)   BMI 25.99 kg/m²   Physical Exam  Vitals signs and nursing note reviewed.   Constitutional:       Appearance: Normal appearance.   HENT:      Head: Normocephalic and atraumatic.      Right Ear: Tympanic membrane normal.      Left Ear: Tympanic membrane normal.      Nose: Congestion and rhinorrhea present.      Mouth/Throat:      Mouth: Mucous membranes are moist.   Eyes:      Extraocular Movements: Extraocular movements intact.    "   Pupils: Pupils are equal, round, and reactive to light.   Neck:      Musculoskeletal: Normal range of motion.   Cardiovascular:      Rate and Rhythm: Normal rate and regular rhythm.      Pulses: Normal pulses.      Heart sounds: Normal heart sounds.   Pulmonary:      Effort: Pulmonary effort is normal.   Abdominal:      General: Abdomen is flat.      Palpations: Abdomen is soft.   Skin:     General: Skin is warm and dry.   Neurological:      General: No focal deficit present.      Mental Status: She is alert and oriented to person, place, and time.         Assessment/Plan   Problems Addressed this Visit     None      Visit Diagnoses     Mild persistent asthma without complication    -  Primary    Relevant Medications    albuterol sulfate  (90 Base) MCG/ACT inhaler    montelukast (Singulair) 10 MG tablet      Diagnoses       Codes Comments    Mild persistent asthma without complication    -  Primary ICD-10-CM: J45.30  ICD-9-CM: 493.90            New Medications Ordered This Visit   Medications   • albuterol sulfate  (90 Base) MCG/ACT inhaler     Sig: Inhale 2 puffs Every 4 (Four) Hours As Needed for Wheezing.     Dispense:  18 g     Refill:  11   • montelukast (Singulair) 10 MG tablet     Sig: Take 1 tablet by mouth Every Night.     Dispense:  30 tablet     Refill:  11       It's not just what you eat, but when you eat  Eat breakfast, and eat smaller meals throughout the day. A healthy breakfast can jumpstart your metabolism, while eating small, healthy meals (rather than the standard three large meals) keeps your energy up.   Avoid eating at night. Try to eat dinner earlier and fast for 14-16 hours until breakfast the next morning. Studies suggest that eating only when you’re most active and giving your digestive system a long break each day may help to regulate weight.     Add singulair and ventolin as directed follow up if worsen -patient agrees with plan of action

## 2021-01-29 RX ORDER — ALBENDAZOLE 200 MG/1
TABLET, FILM COATED ORAL
Qty: 4 TABLET | Refills: 0 | Status: SHIPPED | OUTPATIENT
Start: 2021-01-29 | End: 2021-06-27

## 2021-01-29 NOTE — PROGRESS NOTES
[facility] from patient requesting medication for pinworms be changed to albendazole due to insurance preference and availability. Albendazole rx sent to pharmacy.

## 2021-01-29 NOTE — PROGRESS NOTES
HPI  Nirmala Tamayo is a 25 y.o. female  presents with complaint of daughter having pinworms and requesting treatment for it. She denies any symptoms at this time. Daughter prescribe mebendazole and she requests same med.     Review of Systems   HENT: Negative.    Cardiovascular: Negative.    Gastrointestinal: Negative.    Skin: Negative.        Past Medical History:   Diagnosis Date   • Abnormal Pap smear of cervix    • Acute allergic reaction    • Acute bronchitis    • Acute pharyngitis    • Agoraphobia with panic attacks    • Allergic rhinitis    • Anemia    • Anxiety    • Anxiety state    • Asthma     Stable   • Back strain    • Constipation    • Cough    • Disturbance of skin sensation    • Gestational hypertension     with second pregnancy   • Headache    • History of transfusion     after second delivery   • HPV (human papilloma virus) infection    • Hx of preeclampsia, prior pregnancy, currently pregnant 1/2/2018   • Irregular periods    • Irritable bowel syndrome with constipation    • Low back pain    • Lumbosacral dysfunction    • Neck pain    • Palpitations    • Preeclampsia    • Rh negative state in antepartum period, third trimester 12/7/2017   • Rhinitis    • Severe depression (CMS/HCC)    • Spasm     cervical spasm   • Spinal headache    • Upper respiratory infection    • Urinary tract infection 7/14/2017   • Vaginal irritation    • Vitreous floaters     prob, not seen on exam   • Wheezing        Family History   Problem Relation Age of Onset   • Heart disease Mother    • Hypertension Mother    • Hyperthyroidism Mother    • Kidney failure Sister    • Heart failure Sister    • COPD Sister    • Cancer Other    • Diabetes Other    • Hypertension Other    • Stroke Other    • Thyroid disease Other    • Gallbladder disease Other         Gallstones   • Cervical cancer Maternal Grandmother    • Diabetes Maternal Grandmother    • No Known Problems Son    • Febrile seizures Daughter    • COPD Father     • Diabetes Paternal Grandmother    • Cirrhosis Paternal Grandmother    • Parkinsonism Maternal Grandfather    • Heart disease Maternal Grandfather    • Hypothyroidism Sister    • No Known Problems Son        Social History     Socioeconomic History   • Marital status:      Spouse name: Not on file   • Number of children: Not on file   • Years of education: Not on file   • Highest education level: Not on file   Tobacco Use   • Smoking status: Never Smoker   • Smokeless tobacco: Never Used   Substance and Sexual Activity   • Alcohol use: No   • Drug use: No   • Sexual activity: Yes     Partners: Male     Birth control/protection: None     Comment: last pap smear 4/16/19 negative          There were no vitals taken for this visit.    PHYSICAL EXAM  Physical Exam   Constitutional: She appears well-developed and well-nourished.   HENT:   Head: Normocephalic.   Nose: Nose normal.   Neck: Neck normal appearance.  Pulmonary/Chest: Effort normal.   Neurological: She is alert.   Psychiatric: She has a normal mood and affect. Her speech is normal.       Diagnoses and all orders for this visit:    1. Cntct w & expsr to pediculosis, acariasis & oth infestations (Primary)  -     mebendazole (VERMOX) 100 MG chewable tablet; Take first dose now and repeat in 3 weeks if symptoms persist.  Dispense: 2 tablet; Refill: 0          FOLLOW-UP  As discussed during visit with [facility], if symptoms worsen or fail to improve, follow-up with PCP/Urgent Care/Emergency Department.    Patient verbalizes understanding of medications, instructions for treatment and follow-up.    Elina Woodward, JOSE CARLOS  01/28/2021  20:23 EST    This visit was performed via Telehealth.  This patient has been instructed to follow-up with their primary care provider if their symptoms worsen or the treatment provided does not resolve their illness.

## 2021-02-12 ENCOUNTER — TELEPHONE (OUTPATIENT)
Dept: OBSTETRICS AND GYNECOLOGY | Facility: CLINIC | Age: 26
End: 2021-02-12

## 2021-02-12 NOTE — TELEPHONE ENCOUNTER
CALLED TO RESCHEDULE PATIENT FROM YESTERDAY. DIDN'T GET AN ANSWER SO I LEFT HER A MESSAGE TO CALL ME.

## 2021-02-25 ENCOUNTER — OFFICE VISIT (OUTPATIENT)
Dept: FAMILY MEDICINE CLINIC | Facility: CLINIC | Age: 26
End: 2021-02-25

## 2021-02-25 VITALS
SYSTOLIC BLOOD PRESSURE: 112 MMHG | DIASTOLIC BLOOD PRESSURE: 80 MMHG | WEIGHT: 174 LBS | BODY MASS INDEX: 25.77 KG/M2 | HEIGHT: 69 IN | TEMPERATURE: 99 F

## 2021-02-25 DIAGNOSIS — M62.838 MUSCLE SPASM: ICD-10-CM

## 2021-02-25 DIAGNOSIS — M54.2 NECK PAIN: Primary | ICD-10-CM

## 2021-02-25 PROCEDURE — 99213 OFFICE O/P EST LOW 20 MIN: CPT | Performed by: NURSE PRACTITIONER

## 2021-02-25 PROCEDURE — 96372 THER/PROPH/DIAG INJ SC/IM: CPT | Performed by: NURSE PRACTITIONER

## 2021-02-25 RX ORDER — BACLOFEN 10 MG/1
10 TABLET ORAL 3 TIMES DAILY
Qty: 90 TABLET | Refills: 5 | Status: SHIPPED | OUTPATIENT
Start: 2021-02-25 | End: 2021-03-25

## 2021-02-25 RX ORDER — TRIAMCINOLONE ACETONIDE 40 MG/ML
80 INJECTION, SUSPENSION INTRA-ARTICULAR; INTRAMUSCULAR ONCE
Status: COMPLETED | OUTPATIENT
Start: 2021-02-25 | End: 2021-02-25

## 2021-02-25 RX ADMIN — TRIAMCINOLONE ACETONIDE 80 MG: 40 INJECTION, SUSPENSION INTRA-ARTICULAR; INTRAMUSCULAR at 10:20

## 2021-02-25 NOTE — PROGRESS NOTES
"  Chief Complaint   Patient presents with   • Neck Pain     causing her to have headaches     Subjective   Nirmala Tamayo is a 26 y.o. female.     Neck Pain   This is a recurrent problem. The current episode started more than 1 month ago. The problem occurs every several days. The problem has been gradually worsening. The pain is present in the anterior neck. The quality of the pain is described as aching. The pain is at a severity of 3/10. The symptoms are aggravated by position. Stiffness is present all day. Pertinent negatives include no chest pain, fever, headaches, leg pain, numbness, pain with swallowing, paresis, photophobia, syncope, tingling, trouble swallowing, visual change, weakness or weight loss. She has tried acetaminophen, ice and NSAIDs for the symptoms. The treatment provided mild relief.        The following portions of the patient's history were reviewed and updated as appropriate: allergies, current medications, past social history and problem list.    Review of Systems   Constitutional: Negative for fever and weight loss.   HENT: Negative for trouble swallowing.    Eyes: Negative.  Negative for photophobia.   Respiratory: Negative.  Negative for shortness of breath and wheezing.    Cardiovascular: Negative.  Negative for chest pain and syncope.   Gastrointestinal: Negative.    Endocrine: Negative.  Negative for heat intolerance, polydipsia, polyphagia and polyuria.   Genitourinary: Negative.    Musculoskeletal: Positive for arthralgias, back pain, gait problem, joint swelling, myalgias, neck pain and neck stiffness.        Neck pain on right side    Skin: Negative.  Negative for color change, pallor and rash.   Allergic/Immunologic: Negative.    Neurological: Negative for tingling, weakness, numbness and headaches.   Hematological: Negative.    Psychiatric/Behavioral: Negative.        Objective   /80   Temp 99 °F (37.2 °C) (Tympanic)   Ht 175.3 cm (69\")   Wt 78.9 kg (174 lb)  "  BMI 25.70 kg/m²   Physical Exam  Vitals signs and nursing note reviewed.   Constitutional:       Appearance: Normal appearance.   HENT:      Head: Normocephalic.      Right Ear: Tympanic membrane normal.      Nose: Nose normal.      Mouth/Throat:      Mouth: Mucous membranes are moist.   Eyes:      Extraocular Movements: Extraocular movements intact.      Pupils: Pupils are equal, round, and reactive to light.   Neck:      Musculoskeletal: Normal range of motion.   Cardiovascular:      Rate and Rhythm: Normal rate.      Pulses: Normal pulses.      Heart sounds: Normal heart sounds.   Pulmonary:      Effort: Pulmonary effort is normal.      Breath sounds: Normal breath sounds.   Abdominal:      General: Abdomen is flat.      Palpations: Abdomen is soft.   Musculoskeletal:         General: Tenderness present. No swelling, deformity or signs of injury.      Cervical back: She exhibits tenderness, bony tenderness, pain and spasm.        Back:       Right lower leg: No edema.      Left lower leg: No edema.   Skin:     General: Skin is warm.   Neurological:      General: No focal deficit present.      Mental Status: She is alert and oriented to person, place, and time.         Assessment/Plan   Problems Addressed this Visit     None      Visit Diagnoses     Neck pain    -  Primary    Relevant Medications    triamcinolone acetonide (KENALOG-40) injection 80 mg (Completed)    Other Relevant Orders    XR Spine Cervical 2 or 3 View (Completed)    Muscle spasm        Relevant Medications    triamcinolone acetonide (KENALOG-40) injection 80 mg (Completed)      Diagnoses       Codes Comments    Neck pain    -  Primary ICD-10-CM: M54.2  ICD-9-CM: 723.1     Muscle spasm     ICD-10-CM: M62.838  ICD-9-CM: 728.85            New Medications Ordered This Visit   Medications   • baclofen (LIORESAL) 10 MG tablet     Sig: Take 1 tablet by mouth 3 (Three) Times a Day.     Dispense:  90 tablet     Refill:  5   • diclofenac (VOLTAREN) 50  MG EC tablet     Sig: Take 1 tablet by mouth 2 (Two) Times a Day As Needed (bid prn).     Dispense:  60 tablet     Refill:  11   • triamcinolone acetonide (KENALOG-40) injection 80 mg      meds as directed -kenalog today, voltaren and baclofen as directed, follow up if worsen     Narrative & Impression     EXAM DESCRIPTION:      XR SPINE CERVICAL 2 OR 3 VW     CLINICAL HISTORY:      26 years  Female  neck pain, M54.2 Cervicalgia     COMPARISON:      June 2015     TECHNIQUE:      Three views-AP, lateral, and open-mouth radiographs of the  cervical spine     FINDINGS:      There is no evidence of an acute fracture. The disc spaces are  well-preserved. There is no prevertebral soft tissue swelling  identified. The open-mouth view is within normal limits.     IMPRESSION:        1. Stable appearance of the cervical spine without evidence of an  acute fracture or significant degenerative change.           Electronically signed by:  Jackie Ware MD  2/25/2021 10:46 AM  [facility] Workstation: [id number]

## 2021-03-11 ENCOUNTER — IMMUNIZATION (OUTPATIENT)
Dept: VACCINE CLINIC | Facility: HOSPITAL | Age: 26
End: 2021-03-11

## 2021-03-11 PROCEDURE — 91300 HC SARSCOV02 VAC 30MCG/0.3ML IM: CPT | Performed by: THORACIC SURGERY (CARDIOTHORACIC VASCULAR SURGERY)

## 2021-03-11 PROCEDURE — 0001A: CPT | Performed by: THORACIC SURGERY (CARDIOTHORACIC VASCULAR SURGERY)

## 2021-03-22 ENCOUNTER — OFFICE VISIT (OUTPATIENT)
Dept: OBSTETRICS AND GYNECOLOGY | Facility: CLINIC | Age: 26
End: 2021-03-22

## 2021-03-22 ENCOUNTER — LAB (OUTPATIENT)
Dept: LAB | Facility: HOSPITAL | Age: 26
End: 2021-03-22

## 2021-03-22 VITALS
WEIGHT: 169 LBS | HEIGHT: 69 IN | DIASTOLIC BLOOD PRESSURE: 70 MMHG | SYSTOLIC BLOOD PRESSURE: 116 MMHG | BODY MASS INDEX: 25.03 KG/M2

## 2021-03-22 DIAGNOSIS — N92.1 BREAKTHROUGH BLEEDING: Primary | ICD-10-CM

## 2021-03-22 LAB
HCG INTACT+B SERPL-ACNC: <0.5 MIU/ML
TSH SERPL DL<=0.05 MIU/L-ACNC: 1.17 UIU/ML (ref 0.27–4.2)

## 2021-03-22 PROCEDURE — 84443 ASSAY THYROID STIM HORMONE: CPT | Performed by: NURSE PRACTITIONER

## 2021-03-22 PROCEDURE — 99212 OFFICE O/P EST SF 10 MIN: CPT | Performed by: NURSE PRACTITIONER

## 2021-03-22 PROCEDURE — 84702 CHORIONIC GONADOTROPIN TEST: CPT | Performed by: NURSE PRACTITIONER

## 2021-03-22 PROCEDURE — 36415 COLL VENOUS BLD VENIPUNCTURE: CPT | Performed by: NURSE PRACTITIONER

## 2021-03-22 NOTE — PROGRESS NOTES
Subjective   Nirmala Tamayo is a 26 y.o. breakthrough bleeding     LMP: 03/04/2021  Pap: NIL, 04/2019  BC: none     Pt presents with complaints of change in menses this month.  Typically her periods are regular, flow is light, lasting less than 1 week.  She had period from 03/04/2021 till 03/12/2021.  Then she had period like bleeding again from 03/18/2021 which is easing up today.  She denies any new stressors.  Her mother has history of thyroid disease, but she has never been diagnosed.  Pt does admit to purposeful weight loss due to change in healthy diet.  In the past month she has lost around 10 lbs from her home scale.       Vaginal Bleeding  The patient's primary symptoms include vaginal bleeding. The patient's pertinent negatives include no genital itching, genital lesions, genital odor, genital rash, missed menses, pelvic pain or vaginal discharge. This is a new problem. The current episode started in the past 7 days. The problem occurs daily. The problem has been gradually improving. The patient is experiencing no pain. She is not pregnant. Pertinent negatives include no abdominal pain, anorexia, back pain, chills, constipation, diarrhea, discolored urine, dysuria, fever, flank pain, frequency, headaches, hematuria, joint pain, joint swelling, nausea, painful intercourse, rash, sore throat, urgency or vomiting. The vaginal discharge was bloody. The vaginal bleeding is typical of menses. She has not been passing clots. She has not been passing tissue. She is sexually active. No, her partner does not have an STD. She uses nothing for contraception. Her menstrual history has been regular.       The following portions of the patient's history were reviewed and updated as appropriate: allergies, current medications, past family history, past medical history, past social history, past surgical history and problem list.    Review of Systems   Constitutional: Negative for chills, diaphoresis, fatigue,  fever and unexpected weight change.   HENT: Negative for sore throat.    Respiratory: Negative for apnea, chest tightness and shortness of breath.    Cardiovascular: Negative for chest pain and palpitations.   Gastrointestinal: Negative for abdominal distention, abdominal pain, anorexia, constipation, diarrhea, nausea and vomiting.   Genitourinary: Positive for vaginal bleeding. Negative for decreased urine volume, difficulty urinating, dyspareunia, dysuria, enuresis, flank pain, frequency, genital sores, hematuria, menstrual problem, missed menses, pelvic pain, urgency, vaginal discharge and vaginal pain.   Musculoskeletal: Negative for back pain and joint pain.   Skin: Negative for rash.   Neurological: Negative for headaches.   Psychiatric/Behavioral: Negative for sleep disturbance and suicidal ideas.         Objective   Physical Exam  Vitals and nursing note reviewed.   Constitutional:       General: She is awake. She is not in acute distress.     Appearance: Normal appearance. She is well-developed and well-groomed. She is not ill-appearing, toxic-appearing or diaphoretic.   Cardiovascular:      Rate and Rhythm: Normal rate and regular rhythm.      Heart sounds: Normal heart sounds.   Pulmonary:      Effort: Pulmonary effort is normal.      Breath sounds: Normal breath sounds.   Abdominal:      General: Bowel sounds are normal.      Palpations: Abdomen is soft.      Tenderness: There is no abdominal tenderness.   Skin:     General: Skin is warm and dry.   Neurological:      Mental Status: She is alert and oriented to person, place, and time.   Psychiatric:         Attention and Perception: Attention and perception normal.         Mood and Affect: Mood and affect normal.         Speech: Speech normal.         Behavior: Behavior normal. Behavior is cooperative.           Assessment/Plan   Diagnoses and all orders for this visit:    1. Breakthrough bleeding (Primary)  -     hCG, Quantitative, Pregnancy  -      TSH      Discussed potential causes of BTB.  More than likely r/t recent weight loss.  Discussed treatment options if BTB becomes persistent.  Pt declines and reports she does not do well on hormones.  Per pt preference will check TSH and serum HCG.  Will call with results and plan of care.

## 2021-04-01 ENCOUNTER — IMMUNIZATION (OUTPATIENT)
Dept: VACCINE CLINIC | Facility: HOSPITAL | Age: 26
End: 2021-04-01

## 2021-04-01 PROCEDURE — 91300 HC SARSCOV02 VAC 30MCG/0.3ML IM: CPT | Performed by: THORACIC SURGERY (CARDIOTHORACIC VASCULAR SURGERY)

## 2021-04-01 PROCEDURE — 0002A: CPT | Performed by: THORACIC SURGERY (CARDIOTHORACIC VASCULAR SURGERY)

## 2021-04-02 ENCOUNTER — HOSPITAL ENCOUNTER (EMERGENCY)
Facility: HOSPITAL | Age: 26
Discharge: HOME | End: 2021-04-02
Attending: EMERGENCY MEDICINE | Admitting: EMERGENCY MEDICINE

## 2021-04-02 VITALS
HEIGHT: 69 IN | TEMPERATURE: 98.8 F | HEART RATE: 81 BPM | RESPIRATION RATE: 18 BRPM | DIASTOLIC BLOOD PRESSURE: 55 MMHG | BODY MASS INDEX: 24.88 KG/M2 | SYSTOLIC BLOOD PRESSURE: 102 MMHG | OXYGEN SATURATION: 98 % | WEIGHT: 168 LBS

## 2021-04-02 DIAGNOSIS — T50.B95A ADVERSE EFFECT OF COVID-19 VACCINE: Primary | ICD-10-CM

## 2021-04-02 LAB
ALBUMIN SERPL-MCNC: 4.4 G/DL (ref 3.5–5.2)
ALBUMIN/GLOB SERPL: 1.6 G/DL
ALP SERPL-CCNC: 78 U/L (ref 39–117)
ALT SERPL W P-5'-P-CCNC: 12 U/L (ref 1–33)
ANION GAP SERPL CALCULATED.3IONS-SCNC: 9 MMOL/L (ref 5–15)
AST SERPL-CCNC: 13 U/L (ref 1–32)
B-HCG UR QL: NEGATIVE
BASOPHILS # BLD AUTO: 0.03 10*3/MM3 (ref 0–0.2)
BASOPHILS NFR BLD AUTO: 0.5 % (ref 0–1.5)
BILIRUB SERPL-MCNC: 0.3 MG/DL (ref 0–1.2)
BILIRUB UR QL STRIP: NEGATIVE
BUN SERPL-MCNC: 11 MG/DL (ref 6–20)
BUN/CREAT SERPL: 15.1 (ref 7–25)
CALCIUM SPEC-SCNC: 9.4 MG/DL (ref 8.6–10.5)
CHLORIDE SERPL-SCNC: 106 MMOL/L (ref 98–107)
CLARITY UR: ABNORMAL
CO2 SERPL-SCNC: 25 MMOL/L (ref 22–29)
COLOR UR: YELLOW
CREAT SERPL-MCNC: 0.73 MG/DL (ref 0.57–1)
DEPRECATED RDW RBC AUTO: 41.8 FL (ref 37–54)
EOSINOPHIL # BLD AUTO: 0 10*3/MM3 (ref 0–0.4)
EOSINOPHIL NFR BLD AUTO: 0 % (ref 0.3–6.2)
ERYTHROCYTE [DISTWIDTH] IN BLOOD BY AUTOMATED COUNT: 13.7 % (ref 12.3–15.4)
FLUAV RNA RESP QL NAA+PROBE: NOT DETECTED
FLUBV RNA RESP QL NAA+PROBE: NOT DETECTED
GFR SERPL CREATININE-BSD FRML MDRD: 96 ML/MIN/1.73
GLOBULIN UR ELPH-MCNC: 2.8 GM/DL
GLUCOSE SERPL-MCNC: 147 MG/DL (ref 65–99)
GLUCOSE UR STRIP-MCNC: NEGATIVE MG/DL
HCT VFR BLD AUTO: 40.2 % (ref 34–46.6)
HGB BLD-MCNC: 13.1 G/DL (ref 12–15.9)
HGB UR QL STRIP.AUTO: NEGATIVE
HOLD SPECIMEN: NORMAL
IMM GRANULOCYTES # BLD AUTO: 0.01 10*3/MM3 (ref 0–0.05)
IMM GRANULOCYTES NFR BLD AUTO: 0.2 % (ref 0–0.5)
KETONES UR QL STRIP: NEGATIVE
LEUKOCYTE ESTERASE UR QL STRIP.AUTO: NEGATIVE
LYMPHOCYTES # BLD AUTO: 0.6 10*3/MM3 (ref 0.7–3.1)
LYMPHOCYTES NFR BLD AUTO: 9.3 % (ref 19.6–45.3)
MCH RBC QN AUTO: 27.3 PG (ref 26.6–33)
MCHC RBC AUTO-ENTMCNC: 32.6 G/DL (ref 31.5–35.7)
MCV RBC AUTO: 83.8 FL (ref 79–97)
MONOCYTES # BLD AUTO: 0.58 10*3/MM3 (ref 0.1–0.9)
MONOCYTES NFR BLD AUTO: 9 % (ref 5–12)
NEUTROPHILS NFR BLD AUTO: 5.21 10*3/MM3 (ref 1.7–7)
NEUTROPHILS NFR BLD AUTO: 81 % (ref 42.7–76)
NITRITE UR QL STRIP: NEGATIVE
NRBC BLD AUTO-RTO: 0 /100 WBC (ref 0–0.2)
PH UR STRIP.AUTO: 8 [PH] (ref 5–9)
PLATELET # BLD AUTO: 168 10*3/MM3 (ref 140–450)
PMV BLD AUTO: 10.1 FL (ref 6–12)
POTASSIUM SERPL-SCNC: 3.5 MMOL/L (ref 3.5–5.2)
PROT SERPL-MCNC: 7.2 G/DL (ref 6–8.5)
PROT UR QL STRIP: ABNORMAL
RBC # BLD AUTO: 4.8 10*6/MM3 (ref 3.77–5.28)
SARS-COV-2 RNA RESP QL NAA+PROBE: NOT DETECTED
SODIUM SERPL-SCNC: 140 MMOL/L (ref 136–145)
SP GR UR STRIP: 1.03 (ref 1–1.03)
UROBILINOGEN UR QL STRIP: ABNORMAL
WBC # BLD AUTO: 6.43 10*3/MM3 (ref 3.4–10.8)
WHOLE BLOOD HOLD SPECIMEN: NORMAL

## 2021-04-02 PROCEDURE — 25010000002 ONDANSETRON PER 1 MG: Performed by: STUDENT IN AN ORGANIZED HEALTH CARE EDUCATION/TRAINING PROGRAM

## 2021-04-02 PROCEDURE — 80053 COMPREHEN METABOLIC PANEL: CPT | Performed by: STUDENT IN AN ORGANIZED HEALTH CARE EDUCATION/TRAINING PROGRAM

## 2021-04-02 PROCEDURE — 87636 SARSCOV2 & INF A&B AMP PRB: CPT | Performed by: STUDENT IN AN ORGANIZED HEALTH CARE EDUCATION/TRAINING PROGRAM

## 2021-04-02 PROCEDURE — 25010000002 KETOROLAC TROMETHAMINE PER 15 MG: Performed by: STUDENT IN AN ORGANIZED HEALTH CARE EDUCATION/TRAINING PROGRAM

## 2021-04-02 PROCEDURE — 99283 EMERGENCY DEPT VISIT LOW MDM: CPT

## 2021-04-02 PROCEDURE — 96375 TX/PRO/DX INJ NEW DRUG ADDON: CPT

## 2021-04-02 PROCEDURE — 81003 URINALYSIS AUTO W/O SCOPE: CPT | Performed by: STUDENT IN AN ORGANIZED HEALTH CARE EDUCATION/TRAINING PROGRAM

## 2021-04-02 PROCEDURE — 96374 THER/PROPH/DIAG INJ IV PUSH: CPT

## 2021-04-02 PROCEDURE — 85025 COMPLETE CBC W/AUTO DIFF WBC: CPT | Performed by: STUDENT IN AN ORGANIZED HEALTH CARE EDUCATION/TRAINING PROGRAM

## 2021-04-02 PROCEDURE — 81025 URINE PREGNANCY TEST: CPT | Performed by: STUDENT IN AN ORGANIZED HEALTH CARE EDUCATION/TRAINING PROGRAM

## 2021-04-02 RX ORDER — SODIUM CHLORIDE 0.9 % (FLUSH) 0.9 %
10 SYRINGE (ML) INJECTION AS NEEDED
Status: DISCONTINUED | OUTPATIENT
Start: 2021-04-02 | End: 2021-04-02 | Stop reason: HOSPADM

## 2021-04-02 RX ORDER — KETOROLAC TROMETHAMINE 30 MG/ML
30 INJECTION, SOLUTION INTRAMUSCULAR; INTRAVENOUS ONCE
Status: COMPLETED | OUTPATIENT
Start: 2021-04-02 | End: 2021-04-02

## 2021-04-02 RX ORDER — PROMETHAZINE HYDROCHLORIDE 25 MG/1
25 TABLET ORAL EVERY 6 HOURS PRN
Qty: 15 TABLET | Refills: 0 | Status: SHIPPED | OUTPATIENT
Start: 2021-04-02 | End: 2021-06-27

## 2021-04-02 RX ORDER — ONDANSETRON 2 MG/ML
4 INJECTION INTRAMUSCULAR; INTRAVENOUS ONCE
Status: COMPLETED | OUTPATIENT
Start: 2021-04-02 | End: 2021-04-02

## 2021-04-02 RX ADMIN — ONDANSETRON HYDROCHLORIDE 4 MG: 2 INJECTION, SOLUTION INTRAMUSCULAR; INTRAVENOUS at 02:43

## 2021-04-02 RX ADMIN — KETOROLAC TROMETHAMINE 30 MG: 30 INJECTION, SOLUTION INTRAMUSCULAR; INTRAVENOUS at 03:00

## 2021-04-02 RX ADMIN — SODIUM CHLORIDE 1000 ML: 900 INJECTION, SOLUTION INTRAVENOUS at 02:41

## 2021-04-02 NOTE — DISCHARGE INSTRUCTIONS
Get lots of rest.  Stay hydrated.  Follow-up with your PCP in 1 day.  Use Phenergan for nausea.  Do not drive or operate heavy machinery when you take Phenergan.  Return to ED or call your PCP should you develop any further concerning symptoms.

## 2021-04-02 NOTE — ED PROVIDER NOTES
Subjective   Patient presents with chills, nausea, headache, body aches after getting her second Covid vaccine yesterday.  Patient was unable to drink or eat anything for the greater part of the day.  She was able to take 2 ibuprofen tablets around 145 which resolved her fever.  Patient states her temperature got up to 102 °F this afternoon.          Review of Systems   Constitutional: Positive for chills and fatigue. Negative for activity change and appetite change.   HENT: Negative for congestion and ear pain.    Eyes: Negative for pain and discharge.   Respiratory: Negative for chest tightness and shortness of breath.    Cardiovascular: Positive for palpitations. Negative for chest pain.   Gastrointestinal: Positive for nausea. Negative for abdominal distention and abdominal pain.   Endocrine: Negative for cold intolerance and heat intolerance.   Genitourinary: Negative for difficulty urinating and dysuria.   Musculoskeletal: Negative for arthralgias and back pain.   Skin: Negative for color change and rash.   Allergic/Immunologic: Negative for environmental allergies and food allergies.   Neurological: Positive for headaches. Negative for dizziness.   Hematological: Negative for adenopathy. Does not bruise/bleed easily.   Psychiatric/Behavioral: Negative for agitation and confusion.       Past Medical History:   Diagnosis Date   • Abnormal Pap smear of cervix    • Acute allergic reaction    • Acute bronchitis    • Acute pharyngitis    • Agoraphobia with panic attacks    • Allergic rhinitis    • Anemia    • Anxiety    • Anxiety state    • Asthma     Stable   • Back strain    • Constipation    • Cough    • Disturbance of skin sensation    • Gestational hypertension     with second pregnancy   • Headache    • History of transfusion     after second delivery   • HPV (human papilloma virus) infection    • Hx of preeclampsia, prior pregnancy, currently pregnant 1/2/2018   • Irregular periods    • Irritable bowel  syndrome with constipation    • Low back pain    • Lumbosacral dysfunction    • Neck pain    • Palpitations    • Preeclampsia    • Rh negative state in antepartum period, third trimester 12/7/2017   • Rhinitis    • Severe depression (CMS/HCC)    • Spasm     cervical spasm   • Spinal headache    • Upper respiratory infection    • Urinary tract infection 7/14/2017   • Vaginal irritation    • Vitreous floaters     prob, not seen on exam   • Wheezing        Allergies   Allergen Reactions   • Banana Angioedema and Hives     Itchy throat   • Latex Hives and Angioedema       Past Surgical History:   Procedure Laterality Date   • PROCEDURE GENERIC CONVERTED  02/01/2016    Physical Therapy Consult   • WISDOM TOOTH EXTRACTION         Family History   Problem Relation Age of Onset   • Heart disease Mother    • Hypertension Mother    • Hyperthyroidism Mother    • Kidney failure Sister    • Heart failure Sister    • COPD Sister    • Cancer Other    • Diabetes Other    • Hypertension Other    • Stroke Other    • Thyroid disease Other    • Gallbladder disease Other         Gallstones   • Cervical cancer Maternal Grandmother    • Diabetes Maternal Grandmother    • No Known Problems Son    • Febrile seizures Daughter    • COPD Father    • Diabetes Paternal Grandmother    • Cirrhosis Paternal Grandmother    • Parkinsonism Maternal Grandfather    • Heart disease Maternal Grandfather    • Hypothyroidism Sister    • No Known Problems Son        Social History     Socioeconomic History   • Marital status:      Spouse name: Not on file   • Number of children: Not on file   • Years of education: Not on file   • Highest education level: Not on file   Tobacco Use   • Smoking status: Never Smoker   • Smokeless tobacco: Never Used   Substance and Sexual Activity   • Alcohol use: No   • Drug use: No   • Sexual activity: Yes     Partners: Male     Birth control/protection: None     Comment: last pap smear 4/16/19 negative             Objective   Physical Exam  Vitals and nursing note reviewed.   Constitutional:       Appearance: She is well-developed. She is ill-appearing (mildly).   HENT:      Head: Normocephalic and atraumatic.      Mouth/Throat:      Mouth: Mucous membranes are dry.   Eyes:      Pupils: Pupils are equal, round, and reactive to light.   Neck:      Thyroid: No thyromegaly.      Vascular: No JVD.      Trachea: No tracheal deviation.   Cardiovascular:      Rate and Rhythm: Regular rhythm. Tachycardia present.      Pulses:           Radial pulses are 2+ on the right side and 2+ on the left side.        Dorsalis pedis pulses are 2+ on the right side and 2+ on the left side.      Heart sounds: Normal heart sounds, S1 normal and S2 normal.   Pulmonary:      Effort: Pulmonary effort is normal.      Breath sounds: Normal breath sounds.   Abdominal:      General: Bowel sounds are normal.      Palpations: Abdomen is soft.      Tenderness: There is no abdominal tenderness.   Musculoskeletal:         General: Normal range of motion.      Cervical back: Normal range of motion.   Skin:     General: Skin is warm and dry.      Capillary Refill: Capillary refill takes 2 to 3 seconds.   Neurological:      Mental Status: She is alert and oriented to person, place, and time.      GCS: GCS eye subscore is 4. GCS verbal subscore is 5. GCS motor subscore is 6.   Psychiatric:         Speech: Speech normal.         Behavior: Behavior normal.         Thought Content: Thought content normal.         Procedures           ED Course  ED Course as of Apr 02 0352 Fri Apr 02, 2021   0305 Feeling much better.  Second bag of IV fluids infusing.    [JELLY]      ED Course User Index  [JELLY] Marie Alvarez MD                                   Lab Results (last 24 hours)     Procedure Component Value Units Date/Time    Extra Tubes [[id number]] Collected: 04/02/21 0239    Specimen: Blood, Venous Line Updated: 04/02/21 1945    Narrative:      The following  orders were created for panel order Extra Tubes.  Procedure                               Abnormality         Status                     ---------                               -----------         ------                     Light Blue Top[[id number]]                                   Final result               Gold Top - SST[[id number]]                                   Final result                 Please view results for these tests on the individual orders.    Light Blue Top [[id number]] Collected: 04/02/21 0239    Specimen: Blood Updated: 04/02/21 0345     Extra Tube hold for add-on     Comment: Auto resulted       Gold Top - SST [[id number]] Collected: 04/02/21 0239    Specimen: Blood Updated: 04/02/21 0345     Extra Tube Hold for add-ons.     Comment: Auto resulted.       COVID-19 and FLU A/B PCR - Swab, Nasopharynx [[id number]]  (Normal) Collected: 04/02/21 0234    Specimen: Swab from Nasopharynx Updated: 04/02/21 0304     COVID19 Not Detected     Influenza A PCR Not Detected     Influenza B PCR Not Detected    Narrative:      Fact sheet for providers: https://www.fda.gov/media/562060/download    Fact sheet for patients: https://www.fda.gov/media/032334/download    Test performed by PCR.    Comprehensive Metabolic Panel [[id number]]  (Abnormal) Collected: 04/02/21 0235    Specimen: Blood from Arm, Left Updated: 04/02/21 0257     Glucose 147 mg/dL      BUN 11 mg/dL      Creatinine 0.73 mg/dL      Sodium 140 mmol/L      Potassium 3.5 mmol/L      Chloride 106 mmol/L      CO2 25.0 mmol/L      Calcium 9.4 mg/dL      Total Protein 7.2 g/dL      Albumin 4.40 g/dL      ALT (SGPT) 12 U/L      AST (SGOT) 13 U/L      Alkaline Phosphatase 78 U/L      Total Bilirubin 0.3 mg/dL      eGFR Non African Amer 96 mL/min/1.73      Globulin 2.8 gm/dL      A/G Ratio 1.6 g/dL      BUN/Creatinine Ratio 15.1     Anion Gap 9.0 mmol/L     Narrative:      GFR Normal >60  Chronic Kidney Disease <60  Kidney Failure <15      Pregnancy, Urine -  Urine, Clean Catch [[id number]]  (Normal) Collected: 04/02/21 0233    Specimen: Urine, Clean Catch Updated: 04/02/21 0253     HCG, Urine QL Negative    Urinalysis With Culture If Indicated - Urine, Clean Catch [[id number]]  (Abnormal) Collected: 04/02/21 0233    Specimen: Urine, Clean Catch Updated: 04/02/21 0243     Color, UA Yellow     Appearance, UA Turbid     pH, UA 8.0     Specific Gravity, UA 1.026     Glucose, UA Negative     Ketones, UA Negative     Bilirubin, UA Negative     Blood, UA Negative     Protein, UA Trace     Leuk Esterase, UA Negative     Nitrite, UA Negative     Urobilinogen, UA 0.2 E.U./dL    Narrative:      Urine microscopic not indicated.    CBC & Differential [[id number]]  (Abnormal) Collected: 04/02/21 0235    Specimen: Blood Updated: 04/02/21 0243    Narrative:      The following orders were created for panel order CBC & Differential.  Procedure                               Abnormality         Status                     ---------                               -----------         ------                     CBC Auto Differential[[id number]]        Abnormal            Final result                 Please view results for these tests on the individual orders.    CBC Auto Differential [[id number]]  (Abnormal) Collected: 04/02/21 0235    Specimen: Blood Updated: 04/02/21 0243     WBC 6.43 10*3/mm3      RBC 4.80 10*6/mm3      Hemoglobin 13.1 g/dL      Hematocrit 40.2 %      MCV 83.8 fL      MCH 27.3 pg      MCHC 32.6 g/dL      RDW 13.7 %      RDW-SD 41.8 fl      MPV 10.1 fL      Platelets 168 10*3/mm3      Neutrophil % 81.0 %      Lymphocyte % 9.3 %      Monocyte % 9.0 %      Eosinophil % 0.0 %      Basophil % 0.5 %      Immature Grans % 0.2 %      Neutrophils, Absolute 5.21 10*3/mm3      Lymphocytes, Absolute 0.60 10*3/mm3      Monocytes, Absolute 0.58 10*3/mm3      Eosinophils, Absolute 0.00 10*3/mm3      Basophils, Absolute 0.03 10*3/mm3      Immature Grans, Absolute 0.01 10*3/mm3      nRBC 0.0  /100 WBC               MDM  Number of Diagnoses or Management Options  Adverse effect of COVID-19 vaccine: new and requires workup  Diagnosis management comments: Labs are unremarkable.  Patient feeling better after medication and IV fluids.  Patient to be discharged home with Phenergan for nausea and follow-up with her PCP.       Amount and/or Complexity of Data Reviewed  Clinical lab tests: ordered and reviewed    Risk of Complications, Morbidity, and/or Mortality  Presenting problems: minimal  Diagnostic procedures: minimal  Management options: minimal    Patient Progress  Patient progress: improved      Final diagnoses:   Adverse effect of COVID-19 vaccine       ED Disposition  ED Disposition     ED Disposition Condition Comment    Discharge Stable           Linnea Verduzco, APRN  [address]  [address]  [place] [id number] [phone]    Call in 1 day  for follow up         Medication List      New Prescriptions    promethazine 25 MG tablet  Commonly known as: PHENERGAN  Take 1 tablet by mouth Every 6 (Six) Hours As Needed for Nausea or Vomiting.           Where to Get Your Medications      These medications were sent to [id number] - [place], [place] - [facility] AT [facility] [address] - [phone]  - [phone] [address] [address]    Phone: [phone]   · promethazine 25 MG tablet       This document has been electronically signed by Marie Alvarez MD on April 2, 2021 03:52 CDT    Marie Alvarez MD PGY-2  Part of this note may be an electronic transcription/translation of spoken language to printed text using the Dragon Dictation System.        Marie Alvarez MD  Resident  04/02/21 0352

## 2021-04-15 ENCOUNTER — OFFICE VISIT (OUTPATIENT)
Dept: OBSTETRICS AND GYNECOLOGY | Facility: CLINIC | Age: 26
End: 2021-04-15

## 2021-04-15 VITALS
BODY MASS INDEX: 24.59 KG/M2 | WEIGHT: 166 LBS | DIASTOLIC BLOOD PRESSURE: 70 MMHG | SYSTOLIC BLOOD PRESSURE: 116 MMHG | HEIGHT: 69 IN

## 2021-04-15 DIAGNOSIS — Z12.4 CERVICAL CANCER SCREENING: Primary | ICD-10-CM

## 2021-04-15 DIAGNOSIS — N89.8 VAGINAL DISCHARGE: ICD-10-CM

## 2021-04-15 PROBLEM — R35.0 URINARY FREQUENCY: Status: RESOLVED | Noted: 2020-03-11 | Resolved: 2021-04-15

## 2021-04-15 PROBLEM — Z36.3 ENCOUNTER FOR ANTENATAL SCREENING FOR MALFORMATION USING ULTRASOUND: Status: RESOLVED | Noted: 2020-04-22 | Resolved: 2021-04-15

## 2021-04-15 PROBLEM — O13.9 GESTATIONAL HYPERTENSION: Status: RESOLVED | Noted: 2020-09-02 | Resolved: 2021-04-15

## 2021-04-15 PROBLEM — O13.3 GESTATIONAL HYPERTENSION, THIRD TRIMESTER: Status: RESOLVED | Noted: 2020-08-20 | Resolved: 2021-04-15

## 2021-04-15 PROBLEM — R51.9 HEADACHE: Status: RESOLVED | Noted: 2020-04-20 | Resolved: 2021-04-15

## 2021-04-15 PROBLEM — Z3A.09 9 WEEKS GESTATION OF PREGNANCY: Status: RESOLVED | Noted: 2020-02-19 | Resolved: 2021-04-15

## 2021-04-15 PROBLEM — O47.00 PRETERM UTERINE CONTRACTIONS, ANTEPARTUM: Status: RESOLVED | Noted: 2020-06-10 | Resolved: 2021-04-15

## 2021-04-15 PROBLEM — O26.849 FETAL SIZE INCONSISTENT WITH DATES: Status: RESOLVED | Noted: 2020-06-17 | Resolved: 2021-04-15

## 2021-04-15 PROBLEM — Z3A.25 25 WEEKS GESTATION OF PREGNANCY: Status: RESOLVED | Noted: 2020-06-10 | Resolved: 2021-04-15

## 2021-04-15 PROBLEM — R50.9 FEVER: Status: RESOLVED | Noted: 2018-05-21 | Resolved: 2021-04-15

## 2021-04-15 PROBLEM — Z64.1 MULTIGRAVIDA: Status: RESOLVED | Noted: 2020-05-20 | Resolved: 2021-04-15

## 2021-04-15 PROBLEM — O36.63X0 MACROSOMIA OF FETUS AFFECTING MANAGEMENT OF MOTHER IN THIRD TRIMESTER: Status: RESOLVED | Noted: 2020-07-03 | Resolved: 2021-04-15

## 2021-04-15 PROBLEM — Z3A.18 18 WEEKS GESTATION OF PREGNANCY: Status: RESOLVED | Noted: 2020-04-22 | Resolved: 2021-04-15

## 2021-04-15 PROBLEM — Z3A.15 15 WEEKS GESTATION OF PREGNANCY: Status: RESOLVED | Noted: 2020-04-01 | Resolved: 2021-04-15

## 2021-04-15 PROBLEM — Z30.41 ENCOUNTER FOR SURVEILLANCE OF CONTRACEPTIVE PILLS: Status: RESOLVED | Noted: 2019-06-14 | Resolved: 2021-04-15

## 2021-04-15 PROBLEM — L70.0 ACNE VULGARIS: Status: RESOLVED | Noted: 2019-06-14 | Resolved: 2021-04-15

## 2021-04-15 PROBLEM — Z67.91 RH NEGATIVE STATUS DURING PREGNANCY IN SECOND TRIMESTER: Status: RESOLVED | Noted: 2020-04-01 | Resolved: 2021-04-15

## 2021-04-15 PROBLEM — O26.892 RH NEGATIVE STATUS DURING PREGNANCY IN SECOND TRIMESTER: Status: RESOLVED | Noted: 2020-04-01 | Resolved: 2021-04-15

## 2021-04-15 PROBLEM — J21.9 ACUTE BRONCHIOLITIS: Status: RESOLVED | Noted: 2019-09-13 | Resolved: 2021-04-15

## 2021-04-15 PROBLEM — O20.0 THREATENED ABORTION: Status: RESOLVED | Noted: 2020-02-05 | Resolved: 2021-04-15

## 2021-04-15 LAB
CANDIDA ALBICANS: NEGATIVE
GARDNERELLA VAGINALIS: NEGATIVE
T VAGINALIS DNA VAG QL PROBE+SIG AMP: NEGATIVE

## 2021-04-15 PROCEDURE — 87510 GARDNER VAG DNA DIR PROBE: CPT | Performed by: NURSE PRACTITIONER

## 2021-04-15 PROCEDURE — 87660 TRICHOMONAS VAGIN DIR PROBE: CPT | Performed by: NURSE PRACTITIONER

## 2021-04-15 PROCEDURE — 87480 CANDIDA DNA DIR PROBE: CPT | Performed by: NURSE PRACTITIONER

## 2021-04-15 PROCEDURE — 99213 OFFICE O/P EST LOW 20 MIN: CPT | Performed by: NURSE PRACTITIONER

## 2021-04-15 RX ORDER — PSEUDOEPHEDRINE HCL 30 MG
100 TABLET ORAL
COMMUNITY
End: 2021-08-05

## 2021-04-15 NOTE — PROGRESS NOTES
Subjective   Nirmala Tamayo is a 26 y.o. breakthrough bleeding, pap smear    LMP: current  Pap: WNL, 2018 or 2019  BC: none    Pt presents with complaints of breakthrough bleeding again this month.  She desires pap smear today due to irregular bleeding. We checked TSH and pregnancy test last month with non-remarkable findings.  The only change recently was purposeful weight loss over last couple months.      Vaginal Bleeding  The patient's primary symptoms include vaginal bleeding and vaginal discharge. The patient's pertinent negatives include no genital itching, genital lesions, genital odor, genital rash, missed menses or pelvic pain. This is a recurrent problem. The current episode started 1 to 4 weeks ago. The problem occurs intermittently. The problem has been waxing and waning. The patient is experiencing no pain (occassional mild cramping with no medications ). She is not pregnant. Pertinent negatives include no abdominal pain, anorexia, back pain, chills, constipation, diarrhea, discolored urine, dysuria, fever, flank pain, frequency, headaches, hematuria, joint pain, joint swelling, nausea, painful intercourse, rash, sore throat, urgency or vomiting. The vaginal discharge was bloody. The vaginal bleeding is typical of menses. She has not been passing clots. She has not been passing tissue. She is sexually active. No, her partner does not have an STD. She uses nothing for contraception. Her menstrual history has been regular.       The following portions of the patient's history were reviewed and updated as appropriate: allergies, current medications, past family history, past medical history, past social history, past surgical history and problem list.    Review of Systems   Constitutional: Negative for chills, diaphoresis, fatigue, fever and unexpected weight change.   HENT: Negative for sore throat.    Respiratory: Negative for apnea, chest tightness and shortness of breath.    Cardiovascular:  Negative for chest pain and palpitations.   Gastrointestinal: Negative for abdominal distention, abdominal pain, anorexia, constipation, diarrhea, nausea and vomiting.   Genitourinary: Positive for menstrual problem, vaginal bleeding and vaginal discharge. Negative for decreased urine volume, difficulty urinating, dyspareunia, dysuria, enuresis, flank pain, frequency, genital sores, hematuria, missed menses, pelvic pain, urgency and vaginal pain.   Musculoskeletal: Negative for back pain and joint pain.   Skin: Negative for rash.   Neurological: Negative for headaches.   Psychiatric/Behavioral: Negative for sleep disturbance and suicidal ideas.         Objective   Physical Exam  Vitals and nursing note reviewed. Exam conducted with a chaperone present.   Constitutional:       General: She is awake. She is not in acute distress.     Appearance: Normal appearance. She is well-developed and well-groomed. She is not ill-appearing, toxic-appearing or diaphoretic.   Cardiovascular:      Rate and Rhythm: Normal rate and regular rhythm.      Heart sounds: Normal heart sounds.   Pulmonary:      Effort: Pulmonary effort is normal.      Breath sounds: Normal breath sounds.   Genitourinary:     General: Normal vulva.      Exam position: Lithotomy position.      Labia:         Right: No rash, tenderness, lesion or injury.         Left: No rash, tenderness, lesion or injury.       Urethra: No prolapse, urethral pain, urethral swelling or urethral lesion.      Vagina: No signs of injury and foreign body. Vaginal discharge and bleeding present. No erythema, tenderness, lesions or prolapsed vaginal walls.      Cervix: Normal.      Uterus: Normal. Not deviated, not enlarged, not fixed, not tender and no uterine prolapse.       Adnexa:         Right: No mass, tenderness or fullness.          Left: No mass, tenderness or fullness.        Comments: Vaginal bleeding removed from vagina and cervix with several swab.  Pap smear obtained.     Skin:     General: Skin is warm and dry.   Neurological:      Mental Status: She is alert and oriented to person, place, and time.   Psychiatric:         Attention and Perception: Attention and perception normal.         Mood and Affect: Mood and affect normal.         Speech: Speech normal.         Behavior: Behavior normal. Behavior is cooperative.           Assessment/Plan   Diagnoses and all orders for this visit:    1. Cervical cancer screening (Primary)  -     Liquid-based Pap Smear, Screening    2. Vaginal discharge  -     Gardnerella vaginalis, Trichomonas vaginalis, Candida albicans, DNA - Swab, Vagina         Discussed BTB with weight loss, may need to consider low dose JANE to help regulate cycles still weight is regulated. Will call with results and plan of care.

## 2021-04-19 DIAGNOSIS — Z30.011 ORAL CONTRACEPTION INITIATION: Primary | ICD-10-CM

## 2021-04-19 LAB
LAB AP CASE REPORT: NORMAL
PATH INTERP SPEC-IMP: NORMAL

## 2021-04-19 RX ORDER — NORETHINDRONE ACETATE AND ETHINYL ESTRADIOL, ETHINYL ESTRADIOL AND FERROUS FUMARATE 1MG-10(24)
1 KIT ORAL DAILY
Qty: 84 TABLET | Refills: 0 | Status: SHIPPED | OUTPATIENT
Start: 2021-04-19 | End: 2021-06-27

## 2021-06-28 ENCOUNTER — CLINICAL SUPPORT (OUTPATIENT)
Dept: FAMILY MEDICINE CLINIC | Facility: CLINIC | Age: 26
End: 2021-06-28

## 2021-06-28 DIAGNOSIS — R09.81 CONGESTION OF NASAL SINUS: Primary | ICD-10-CM

## 2021-06-28 PROCEDURE — 96372 THER/PROPH/DIAG INJ SC/IM: CPT | Performed by: NURSE PRACTITIONER

## 2021-06-28 RX ORDER — TRIAMCINOLONE ACETONIDE 40 MG/ML
60 INJECTION, SUSPENSION INTRA-ARTICULAR; INTRAMUSCULAR ONCE
Status: COMPLETED | OUTPATIENT
Start: 2021-06-28 | End: 2021-06-28

## 2021-06-28 RX ADMIN — TRIAMCINOLONE ACETONIDE 60 MG: 40 INJECTION, SUSPENSION INTRA-ARTICULAR; INTRAMUSCULAR at 11:23

## 2021-07-16 ENCOUNTER — DOCUMENTATION (OUTPATIENT)
Dept: FAMILY MEDICINE CLINIC | Facility: CLINIC | Age: 26
End: 2021-07-16

## 2021-07-16 ENCOUNTER — TELEPHONE (OUTPATIENT)
Dept: FAMILY MEDICINE CLINIC | Facility: CLINIC | Age: 26
End: 2021-07-16

## 2021-07-16 RX ORDER — LINACLOTIDE 290 UG/1
290 CAPSULE, GELATIN COATED ORAL
Qty: 30 CAPSULE | Refills: 5 | Status: SHIPPED | OUTPATIENT
Start: 2021-07-16 | End: 2022-03-15 | Stop reason: CLARIF

## 2021-07-16 NOTE — TELEPHONE ENCOUNTER
I have left message for the patient to call me back, I have sent her a message via [email or web address] telling her that her requested medication has been sent to her Pharmacy and she does not need to be seen.     LURDES Constantino

## 2021-07-16 NOTE — TELEPHONE ENCOUNTER
Last OV 02/25/2021    No Future OV    Script to [facility]    I have let her know that you are out of office and it could be Monday before she hears back about the increased dosage of medication.    LURDES Constantino    ----- Message from Nirmala Tamayo sent at 7/16/2021  3:18 AM CDT -----  Regarding: Prescription Question  Contact: [phone]  I've been taking my linzess 145 and it's not working. I've also been having to take a laxative and it's really not working the best either. It think we had to up to the 240 before. Can I have a stronger linzess?

## 2021-08-05 PROCEDURE — 87635 SARS-COV-2 COVID-19 AMP PRB: CPT | Performed by: NURSE PRACTITIONER

## 2021-08-10 PROCEDURE — 87635 SARS-COV-2 COVID-19 AMP PRB: CPT | Performed by: NURSE PRACTITIONER

## 2021-08-13 ENCOUNTER — OFFICE VISIT (OUTPATIENT)
Dept: FAMILY MEDICINE CLINIC | Facility: CLINIC | Age: 26
End: 2021-08-13

## 2021-08-13 ENCOUNTER — LAB (OUTPATIENT)
Dept: LAB | Facility: HOSPITAL | Age: 26
End: 2021-08-13

## 2021-08-13 VITALS
HEART RATE: 60 BPM | HEIGHT: 69 IN | DIASTOLIC BLOOD PRESSURE: 80 MMHG | OXYGEN SATURATION: 99 % | BODY MASS INDEX: 23.99 KG/M2 | WEIGHT: 162 LBS | TEMPERATURE: 97.8 F | SYSTOLIC BLOOD PRESSURE: 108 MMHG

## 2021-08-13 DIAGNOSIS — R00.2 PALPITATIONS: Primary | ICD-10-CM

## 2021-08-13 DIAGNOSIS — F41.9 ANXIETY: ICD-10-CM

## 2021-08-13 DIAGNOSIS — R94.31 ABNORMAL EKG: ICD-10-CM

## 2021-08-13 LAB
AMPHET+METHAMPHET UR QL: NEGATIVE
AMPHETAMINES UR QL: NEGATIVE
BARBITURATES UR QL SCN: NEGATIVE
BASOPHILS # BLD AUTO: 0.03 10*3/MM3 (ref 0–0.2)
BASOPHILS NFR BLD AUTO: 0.6 % (ref 0–1.5)
BENZODIAZ UR QL SCN: NEGATIVE
BUPRENORPHINE SERPL-MCNC: NEGATIVE NG/ML
CANNABINOIDS SERPL QL: NEGATIVE
COCAINE UR QL: NEGATIVE
DEPRECATED RDW RBC AUTO: 42.5 FL (ref 37–54)
EOSINOPHIL # BLD AUTO: 0.03 10*3/MM3 (ref 0–0.4)
EOSINOPHIL NFR BLD AUTO: 0.6 % (ref 0.3–6.2)
ERYTHROCYTE [DISTWIDTH] IN BLOOD BY AUTOMATED COUNT: 14 % (ref 12.3–15.4)
HCG SERPL QL: NEGATIVE
HCT VFR BLD AUTO: 41.2 % (ref 34–46.6)
HGB BLD-MCNC: 13.2 G/DL (ref 12–15.9)
IMM GRANULOCYTES # BLD AUTO: 0.01 10*3/MM3 (ref 0–0.05)
IMM GRANULOCYTES NFR BLD AUTO: 0.2 % (ref 0–0.5)
LYMPHOCYTES # BLD AUTO: 1.07 10*3/MM3 (ref 0.7–3.1)
LYMPHOCYTES NFR BLD AUTO: 20.6 % (ref 19.6–45.3)
MCH RBC QN AUTO: 26.9 PG (ref 26.6–33)
MCHC RBC AUTO-ENTMCNC: 32 G/DL (ref 31.5–35.7)
MCV RBC AUTO: 83.9 FL (ref 79–97)
METHADONE UR QL SCN: NEGATIVE
MONOCYTES # BLD AUTO: 0.43 10*3/MM3 (ref 0.1–0.9)
MONOCYTES NFR BLD AUTO: 8.3 % (ref 5–12)
NEUTROPHILS NFR BLD AUTO: 3.63 10*3/MM3 (ref 1.7–7)
NEUTROPHILS NFR BLD AUTO: 69.7 % (ref 42.7–76)
NRBC BLD AUTO-RTO: 0 /100 WBC (ref 0–0.2)
OPIATES UR QL: NEGATIVE
OXYCODONE UR QL SCN: NEGATIVE
PCP UR QL SCN: NEGATIVE
PLATELET # BLD AUTO: 211 10*3/MM3 (ref 140–450)
PMV BLD AUTO: 10.5 FL (ref 6–12)
PROPOXYPH UR QL: NEGATIVE
RBC # BLD AUTO: 4.91 10*6/MM3 (ref 3.77–5.28)
TRICYCLICS UR QL SCN: NEGATIVE
WBC # BLD AUTO: 5.2 10*3/MM3 (ref 3.4–10.8)

## 2021-08-13 PROCEDURE — 80053 COMPREHEN METABOLIC PANEL: CPT | Performed by: NURSE PRACTITIONER

## 2021-08-13 PROCEDURE — 84443 ASSAY THYROID STIM HORMONE: CPT | Performed by: NURSE PRACTITIONER

## 2021-08-13 PROCEDURE — 85025 COMPLETE CBC W/AUTO DIFF WBC: CPT | Performed by: NURSE PRACTITIONER

## 2021-08-13 PROCEDURE — 84703 CHORIONIC GONADOTROPIN ASSAY: CPT | Performed by: NURSE PRACTITIONER

## 2021-08-13 PROCEDURE — 36415 COLL VENOUS BLD VENIPUNCTURE: CPT | Performed by: NURSE PRACTITIONER

## 2021-08-13 PROCEDURE — 99213 OFFICE O/P EST LOW 20 MIN: CPT | Performed by: NURSE PRACTITIONER

## 2021-08-13 PROCEDURE — 93010 ELECTROCARDIOGRAM REPORT: CPT | Performed by: INTERNAL MEDICINE

## 2021-08-13 PROCEDURE — 93005 ELECTROCARDIOGRAM TRACING: CPT | Performed by: NURSE PRACTITIONER

## 2021-08-13 PROCEDURE — 80306 DRUG TEST PRSMV INSTRMNT: CPT | Performed by: NURSE PRACTITIONER

## 2021-08-13 PROCEDURE — 83540 ASSAY OF IRON: CPT | Performed by: NURSE PRACTITIONER

## 2021-08-13 RX ORDER — CLONAZEPAM 0.5 MG/1
0.5 TABLET ORAL 2 TIMES DAILY PRN
Qty: 60 TABLET | Refills: 0 | Status: SHIPPED | OUTPATIENT
Start: 2021-08-13

## 2021-08-13 RX ORDER — ESCITALOPRAM OXALATE 5 MG/1
5 TABLET ORAL DAILY
Qty: 30 TABLET | Refills: 11 | Status: SHIPPED | OUTPATIENT
Start: 2021-08-13 | End: 2022-03-04

## 2021-08-13 NOTE — PROGRESS NOTES
Chief Complaint   Patient presents with   • Palpitations     Subjective   Nirmala Tamayo is a 26 y.o. female.     Presents with recheck of anxiety and stress-palpitations -abnormal ekg from another facility     Palpitations   This is a recurrent problem. The current episode started more than 1 month ago. The problem occurs intermittently. The problem has been waxing and waning. The symptoms are aggravated by stress. Associated symptoms include anxiety, chest fullness and an irregular heartbeat. Pertinent negatives include no chest pain, coughing, diaphoresis, dizziness, fever, malaise/fatigue, nausea, near-syncope, numbness, shortness of breath, syncope, vomiting or weakness. The treatment provided mild relief. Risk factors include stress. There is no history of anemia, anxiety, drug use, heart disease, hyperthyroidism or a valve disorder.   Anxiety  Presents for follow-up visit. Symptoms include excessive worry, irritability, nervous/anxious behavior, palpitations and panic. Patient reports no chest pain, confusion, decreased concentration, dizziness, hyperventilation, nausea, restlessness or shortness of breath. Nighttime awakenings: none.     There is no history of anemia, anxiety/panic attacks or hyperthyroidism. Compliance with medications is %.        The following portions of the patient's history were reviewed and updated as appropriate: allergies, current medications, past social history and problem list.    Review of Systems   Constitutional: Positive for irritability. Negative for diaphoresis, fever and malaise/fatigue.   HENT: Negative for congestion.    Eyes: Negative.  Negative for photophobia.   Respiratory: Negative for cough, choking, chest tightness, shortness of breath, wheezing and stridor.    Cardiovascular: Positive for palpitations. Negative for chest pain, leg swelling, syncope and near-syncope.        See ekg  report from another facility    Gastrointestinal: Negative.   "Negative for abdominal distention, nausea and vomiting.   Endocrine: Negative.    Genitourinary: Negative.    Musculoskeletal: Positive for myalgias, neck pain and neck stiffness. Negative for arthralgias, back pain, gait problem and joint swelling.   Skin: Negative.    Allergic/Immunologic: Negative.    Neurological: Negative.  Negative for dizziness, weakness and numbness.   Hematological: Negative.  Negative for adenopathy. Does not bruise/bleed easily.   Psychiatric/Behavioral: Negative for agitation, behavioral problems, confusion, decreased concentration, dysphoric mood, hallucinations, self-injury and sleep disturbance. The patient is nervous/anxious. The patient is not hyperactive.         History of anxiety -uncontrolled        Objective   /80   Pulse 60   Temp 97.8 °F (36.6 °C) (Infrared)   Ht 175.3 cm (69\")   Wt 73.5 kg (162 lb)   LMP 07/22/2021 (Exact Date)   SpO2 99%   BMI 23.92 kg/m²   Physical Exam  Vitals and nursing note reviewed.   Constitutional:       General: She is not in acute distress.     Appearance: Normal appearance. She is not ill-appearing, toxic-appearing or diaphoretic.   HENT:      Head: Normocephalic.      Right Ear: Tympanic membrane normal.      Nose: Nose normal.      Mouth/Throat:      Mouth: Mucous membranes are moist.   Eyes:      Extraocular Movements: Extraocular movements intact.      Pupils: Pupils are equal, round, and reactive to light.   Cardiovascular:      Rate and Rhythm: Normal rate.      Pulses: Normal pulses.      Heart sounds: Normal heart sounds. No murmur heard.   No friction rub.   Pulmonary:      Effort: Pulmonary effort is normal.      Breath sounds: Normal breath sounds.   Abdominal:      General: Abdomen is flat. There is no distension.      Palpations: Abdomen is soft. There is no mass.   Musculoskeletal:         General: No swelling, tenderness, deformity or signs of injury.      Cervical back: Normal range of motion. No spasms, tenderness or " bony tenderness.      Right lower leg: No edema.      Left lower leg: No edema.   Skin:     General: Skin is warm.      Coloration: Skin is not jaundiced or pale.      Findings: No bruising, erythema, lesion or rash.   Neurological:      General: No focal deficit present.      Mental Status: She is alert and oriented to person, place, and time.      Cranial Nerves: No cranial nerve deficit.      Sensory: No sensory deficit.      Motor: No weakness.   Psychiatric:         Mood and Affect: Mood normal.         Behavior: Behavior normal.         Assessment/Plan   Problems Addressed this Visit        Mental Health    Anxiety    Relevant Medications    escitalopram (Lexapro) 5 MG tablet    clonazePAM (KlonoPIN) 0.5 MG tablet    Other Relevant Orders    Urine Drug Screen - Urine, Clean Catch    TSH    CBC & Differential    Comprehensive Metabolic Panel    Iron    hCG, Serum, Qualitative      Other Visit Diagnoses     Palpitations    -  Primary    Relevant Medications    escitalopram (Lexapro) 5 MG tablet    clonazePAM (KlonoPIN) 0.5 MG tablet    Other Relevant Orders    Ambulatory Referral to Cardiology    Urine Drug Screen - Urine, Clean Catch    TSH    CBC & Differential    Comprehensive Metabolic Panel    Iron    hCG, Serum, Qualitative    Abnormal EKG        Relevant Medications    escitalopram (Lexapro) 5 MG tablet    clonazePAM (KlonoPIN) 0.5 MG tablet    Other Relevant Orders    ECG 12 Lead (Completed)    Ambulatory Referral to Cardiology    Urine Drug Screen - Urine, Clean Catch    TSH    CBC & Differential    Comprehensive Metabolic Panel    Iron    hCG, Serum, Qualitative      Diagnoses       Codes Comments    Palpitations    -  Primary ICD-10-CM: R00.2  ICD-9-CM: 785.1     Abnormal EKG     ICD-10-CM: R94.31  ICD-9-CM: 794.31     Anxiety     ICD-10-CM: F41.9  ICD-9-CM: 300.00            New Medications Ordered This Visit   Medications   • escitalopram (Lexapro) 5 MG tablet     Sig: Take 1 tablet by mouth Daily.      Dispense:  30 tablet     Refill:  11   • clonazePAM (KlonoPIN) 0.5 MG tablet     Sig: Take 1 tablet by mouth 2 (Two) Times a Day As Needed for Anxiety.     Dispense:  60 tablet     Refill:  0      ekg reviewed -add lexapro and klonopin prn, meds as directed, er if worsen     Patient understands the risks associated with this controlled medication, including tolerance and addiction.  she also agrees to only obtain this medication from me, and not from a another provider, unless that provider is covering for me in my absence.  she also agrees to be compliant in dosing, and not self adjust the dose of medication.  A signed controlled substance agreement is on file, and she has received a controlled substance education sheet at this a previous visit.  she has also signed a consent for treatment with a controlled substance as per [facility] policy. SANGEETHA was obtained.

## 2021-08-14 LAB
ALBUMIN SERPL-MCNC: 4.5 G/DL (ref 3.5–5.2)
ALBUMIN/GLOB SERPL: 1.4 G/DL
ALP SERPL-CCNC: 70 U/L (ref 39–117)
ALT SERPL W P-5'-P-CCNC: 11 U/L (ref 1–33)
ANION GAP SERPL CALCULATED.3IONS-SCNC: 11 MMOL/L (ref 5–15)
AST SERPL-CCNC: 17 U/L (ref 1–32)
BILIRUB SERPL-MCNC: 0.3 MG/DL (ref 0–1.2)
BUN SERPL-MCNC: 11 MG/DL (ref 6–20)
BUN/CREAT SERPL: 14.5 (ref 7–25)
CALCIUM SPEC-SCNC: 9.7 MG/DL (ref 8.6–10.5)
CHLORIDE SERPL-SCNC: 103 MMOL/L (ref 98–107)
CO2 SERPL-SCNC: 26 MMOL/L (ref 22–29)
CREAT SERPL-MCNC: 0.76 MG/DL (ref 0.57–1)
GFR SERPL CREATININE-BSD FRML MDRD: 92 ML/MIN/1.73
GLOBULIN UR ELPH-MCNC: 3.2 GM/DL
GLUCOSE SERPL-MCNC: 76 MG/DL (ref 65–99)
IRON 24H UR-MRATE: 62 MCG/DL (ref 37–145)
POTASSIUM SERPL-SCNC: 4 MMOL/L (ref 3.5–5.2)
PROT SERPL-MCNC: 7.7 G/DL (ref 6–8.5)
SODIUM SERPL-SCNC: 140 MMOL/L (ref 136–145)
TSH SERPL DL<=0.05 MIU/L-ACNC: 1.05 UIU/ML (ref 0.27–4.2)

## 2021-08-16 ENCOUNTER — TELEPHONE (OUTPATIENT)
Dept: FAMILY MEDICINE CLINIC | Facility: CLINIC | Age: 26
End: 2021-08-16

## 2021-08-18 ENCOUNTER — OFFICE VISIT (OUTPATIENT)
Dept: CARDIOLOGY | Facility: CLINIC | Age: 26
End: 2021-08-18

## 2021-08-18 VITALS
DIASTOLIC BLOOD PRESSURE: 66 MMHG | HEIGHT: 69 IN | WEIGHT: 164 LBS | BODY MASS INDEX: 24.29 KG/M2 | HEART RATE: 78 BPM | OXYGEN SATURATION: 99 % | SYSTOLIC BLOOD PRESSURE: 104 MMHG | TEMPERATURE: 96.8 F

## 2021-08-18 DIAGNOSIS — R00.2 PALPITATIONS: Primary | ICD-10-CM

## 2021-08-18 PROBLEM — R94.31 ABNORMAL EKG: Status: ACTIVE | Noted: 2021-08-18

## 2021-08-18 PROCEDURE — 99213 OFFICE O/P EST LOW 20 MIN: CPT | Performed by: NURSE PRACTITIONER

## 2021-08-18 NOTE — PROGRESS NOTES
Palpitations and Abnormal ECG      History of Present Illness    Ms. Tamayo is a 26 year old patient who presents to [facility] for abnormal EKG & palpitations. She has had an abnormal EKG in the past with an Echo for definitive diagnosis.     Palpitations feel like a pounding in her chest versus a fast heart beat.     Can make her out of breath at times. This is related to her anxiety. She will also have some dizziness with this.     This just started back again after being absent for several years.     Last episode was 5 days ago. The episodes can happen for minutes to hours. They tend to ruin her day when she has them.       Cardiac Risk Factors:  The ASCVD Risk score ([place] LILIANA Burnett., et al., 2013) failed to calculate for the following reasons:    The 2013 ASCVD risk score is only valid for ages 40 to 79      Past Medical History:   Diagnosis Date   • Abnormal EKG 8/18/2021   • Abnormal Pap smear of cervix    • Acute allergic reaction    • Acute bronchitis    • Acute pharyngitis    • Agoraphobia with panic attacks    • Allergic rhinitis    • Anemia    • Anxiety    • Anxiety state    • Asthma     Stable   • Back strain    • Constipation    • Cough    • Disturbance of skin sensation    • Gestational hypertension     with second pregnancy   • Headache    • History of transfusion     after second delivery   • HPV (human papilloma virus) infection    • Hx of preeclampsia, prior pregnancy, currently pregnant 1/2/2018   • Irregular periods    • Irritable bowel syndrome with constipation    • Low back pain    • Lumbosacral dysfunction    • Neck pain    • Palpitations    • Preeclampsia    • Rh negative state in antepartum period, third trimester 12/7/2017   • Rhinitis    • Severe depression (CMS/HCC)    • Spasm     cervical spasm   • Spinal headache    • Upper respiratory infection    • Urinary tract infection 7/14/2017   • Vaginal irritation    • Vitreous floaters     prob, not seen on exam   • Wheezing      Past Surgical History:    Procedure Laterality Date   • PROCEDURE GENERIC CONVERTED  02/01/2016    Physical Therapy Consult   • WISDOM TOOTH EXTRACTION       Social History     Socioeconomic History   • Marital status:      Spouse name: Not on file   • Number of children: Not on file   • Years of education: Not on file   • Highest education level: Not on file   Tobacco Use   • Smoking status: Never Smoker   • Smokeless tobacco: Never Used   Vaping Use   • Vaping Use: Never used   Substance and Sexual Activity   • Alcohol use: No   • Drug use: No   • Sexual activity: Yes     Partners: Male     Birth control/protection: None     Comment: last pap smear 4/16/19 negative      Family History   Problem Relation Age of Onset   • Heart disease Mother    • Hypertension Mother    • Hyperthyroidism Mother    • Kidney failure Sister    • Heart failure Sister    • COPD Sister    • Cancer Other    • Diabetes Other    • Hypertension Other    • Stroke Other    • Thyroid disease Other    • Gallbladder disease Other         Gallstones   • Cervical cancer Maternal Grandmother    • Diabetes Maternal Grandmother    • No Known Problems Son    • Febrile seizures Daughter    • COPD Father    • Diabetes Paternal Grandmother    • Cirrhosis Paternal Grandmother    • Parkinsonism Maternal Grandfather    • Heart disease Maternal Grandfather    • Hypothyroidism Sister    • No Known Problems Son        ALLERGIES:  Allergies   Allergen Reactions   • Banana Angioedema and Hives     Itchy throat   • Latex Hives and Angioedema       Advance Care Planning   ACP discussion was declined by the patient. Patient does not have an advance directive, declines further assistance.       Review of Systems   Constitutional: Negative for chills, decreased appetite and fever.   HENT: Negative.    Eyes: Negative.    Cardiovascular: Positive for palpitations. Negative for chest pain, claudication, dyspnea on exertion, irregular heartbeat and leg swelling.   Respiratory: Negative for  cough, shortness of breath and wheezing.    Endocrine: Negative.    Skin: Negative for dry skin, flushing and rash.   Musculoskeletal: Negative for falls and myalgias.   Gastrointestinal: Negative for abdominal pain, change in bowel habit and melena.   Genitourinary: Negative for frequency and hematuria.   Neurological: Negative for dizziness, light-headedness, loss of balance and weakness.   Psychiatric/Behavioral: Negative for altered mental status and memory loss. The patient is not nervous/anxious.        Current Outpatient Medications   Medication Sig Dispense Refill   • albuterol sulfate  (90 Base) MCG/ACT inhaler Inhale 2 puffs Every 4 (Four) Hours As Needed for Wheezing. 18 g 11   • beclomethasone (Qvar) 40 MCG/ACT inhaler Inhale 2 puffs 2 (Two) Times a Day. 8.6 g 11   • clonazePAM (KlonoPIN) 0.5 MG tablet Take 1 tablet by mouth 2 (Two) Times a Day As Needed for Anxiety. 60 tablet 0   • EPINEPHrine (EPIPEN 2-EMILIANA) 0.3 MG/0.3ML solution auto-injector injection as needed.     • escitalopram (Lexapro) 5 MG tablet Take 1 tablet by mouth Daily. 30 tablet 11   • fluticasone (FLONASE) 50 MCG/ACT nasal spray 1 spray into the nostril(s) as directed by provider Daily. 16 g 0   • Linzess 290 MCG capsule capsule Take 1 capsule by mouth Every Morning Before Breakfast. 30 capsule 5   • ondansetron (ZOFRAN) 4 MG tablet Take 1 tablet by mouth Every 8 (Eight) Hours As Needed for Nausea or Vomiting for up to 2 days. 6 tablet 0     No current facility-administered medications for this visit.       OBJECTIVE:    Physical Exam:   Constitutional:       General: Not in acute distress.     Appearance: Well-developed.   HENT:      Head: Normocephalic and atraumatic.   Neck:      Vascular: No JVD.   Pulmonary:      Effort: Pulmonary effort is normal. No respiratory distress.      Breath sounds: Normal breath sounds. No wheezing. No rales.   Cardiovascular:      Normal rate. Regular rhythm.   Pulses:     Intact distal pulses.  "  Abdominal:      General: Bowel sounds are normal.      Palpations: Abdomen is soft.   Musculoskeletal: Normal range of motion.      Cervical back: Normal range of motion. Skin:     General: Skin is warm and dry.      Findings: No erythema.   Neurological:      Mental Status: Alert and oriented to person, place, and time.   Psychiatric:         Behavior: Behavior normal.         Thought Content: Thought content normal.         Judgment: Judgment normal.       Vitals:    21 1252   BP: 104/66   Pulse: 78   Temp: 96.8 °F (36 °C)   SpO2: 99%   Weight: 74.4 kg (164 lb)   Height: 175.3 cm (69\")       DATA REVIEWED by me:   Results for orders placed in visit on 14    Echo - Converted    [facility]      PT NAME:  ENEEDLIA SEN  [facility]  MR NUMBER:  [id number]  :  1995  ECHOCARDIOGRAM      MEDICAL OFFICE BUILDING  DATE:  2014    PRIMARY CARE PHYSICIAN:    ORDERING DOCTOR:  JOSE CARLOS TALAVERA    REASON FOR ECHO:           INTERPRETING  Palpitations               CARDIOLOGIST:  RONY LONDON MD    PRIOR ECHO:  No            DATE OF PRIOR ECHO:    PATIENT PROCEDURE #:       LOCATION OF STUDY:  [address]                     [facility]      QUANTITATIVE MEASUREMENTS    LA =    35     (<40)     LA/AO Ratio  Ao =    24     (<37)     AoV Open     19   (>15)  IVS=    7      (<11)     LVPW         7    (<11)  LVEDd=  50     (38-56)   LVEDs        36   (22-40)  RV=            (<26)     LVOT         2.2  EF=     55-60  %         %F.S.             (24-46)    Max. Pulmonary Gradient  3  mmHg  RV Systolic Pressure        mmHg    MV Area               cm2  Mean MV Gradient      mmHg  MV p 1/2 t        54  msec    LONG (Doppler)     3  cm2  LONG (Planimeter)     cm2  Max. AV Gradient  8  mmHg  Mean AV Gradient  5  mmHg  AI p 1/2 t           msec      DESCRIPTION:  A routine full transthoracic echocardiogram was performed  with the color flow Doppler. The study " is technically adequate.    FINDINGS:  The left ventricle is normal in size and shape with normal  function. Ejection fraction is estimated at 55% to 60%. The left  ventricle wall motion is normal.    Diastolic function normal.      Pulmonary inflow normal.    The right ventricle is normal in size and shape with normal systolic  function.    The left and right atrium are normal in appearance.    The left ventricle outflow tract, sinuses of Valsalva, sinotubular  junction, and tubular ascending aorta are normal in size. Suprasternal  views of the aortic arch were obtained and were satisfactory. The  proximal and mid aortic arch are normal in size. No abnormality in  color flow Doppler to suggest coarctation.    The mitral valve is normal in appearance.    Aortic valve was normal and trileaflet with normal leaflet excursion.    The tricuspid valve is normal. There was an insufficient jet of  tricuspid regurgitation to calculate pulmonary artery pressures.    The pulmonary valve is normal. Pulmonary artery is normal.    Interatrial septum appears intact by color flow Doppler.    Pericardium normal. No pericardial effusion.    The IVC is normal with appropriate inspiratory collapse showing right  atrial pressure is normal.    Extracardiac normal no pleural effusion.    CONCLUSIONS:  1. Normal left ventricular function with an estimated ejection fraction  of 55% to 60% without regional wall motion abnormalities.  2. Normal right ventricular size with normal function.  3. No significant valvular regurgitation or stenosis.      RONY LONDON MD  Electronically Signed  10/01/2014 11:06:46  By MD CHAYO LEOS/montez  Dictated:   09/30/2014 1347  Transcribed:   10/01/2014 0814  cc:   JOSE CARLOS TALAVERA  Page #page      No radiology results for the last 30 days.       Labs Reviewed by me: BMP, CBC, LIPID, TSH  Lab Results   Component Value Date    GLUCOSE 76 08/13/2021    CALCIUM 9.7 08/13/2021     08/13/2021     K 4.0 08/13/2021    CO2 26.0 08/13/2021     08/13/2021    BUN 11 08/13/2021    CREATININE 0.76 08/13/2021    EGFRIFNONA 92 08/13/2021    BCR 14.5 08/13/2021    ANIONGAP 11.0 08/13/2021     Lab Results   Component Value Date    WBC 5.20 08/13/2021    HGB 13.2 08/13/2021    HCT 41.2 08/13/2021    MCV 83.9 08/13/2021     08/13/2021     No results found for: CHOL  No results found for: TRIG  No results found for: HDL  No components found for: LDLCALC  No results found for: LDL  No results found for: HDLLDLRATIO  No components found for: CHOLHDL  Lab Results   Component Value Date    TSH 1.050 08/13/2021     No results found for: PROBNP        The following portions of the patient's history were reviewed and updated as appropriate: allergies, current medications, past family history, past medical history, past social history, past surgical history and problem list.  Old records that were reviewed and pertinent information is included in the above objective data.     ASSESSMENT/PLAN:       Diagnosis Plan   1. Palpitations  Palpitations, uncertain etiology at this time, warranting further investigation.   Discussed the benign nature of the majority of causes of palpitations.   Discussed lifestyle modifications including reducing caffeine intake, reducing stress, and increasing exercise tolerance.  Reassurance given to patient.    Will not repeat echo at this time.          Patient's Body mass index is 24.22 kg/m². indicating that she is within normal range (BMI 18.5-24.9). No BMI management plan needed..    Follow up 1 month          This document has been electronically signed by JOSE CARLOS Virgen on August 18, 2021 15:05 CDT

## 2021-08-19 LAB
QT INTERVAL: 398 MS
QTC INTERVAL: 403 MS

## 2021-09-14 PROCEDURE — U0004 COV-19 TEST NON-CDC HGH THRU: HCPCS | Performed by: NURSE PRACTITIONER

## 2021-09-15 RX ORDER — METHYLPREDNISOLONE 4 MG/1
TABLET ORAL
Qty: 21 TABLET | Refills: 1 | Status: SHIPPED | OUTPATIENT
Start: 2021-09-15 | End: 2021-09-20

## 2021-09-16 ENCOUNTER — APPOINTMENT (OUTPATIENT)
Dept: GENERAL RADIOLOGY | Facility: HOSPITAL | Age: 26
End: 2021-09-16

## 2021-09-16 ENCOUNTER — TELEPHONE (OUTPATIENT)
Dept: ONCOLOGY | Facility: CLINIC | Age: 26
End: 2021-09-16

## 2021-09-16 ENCOUNTER — HOSPITAL ENCOUNTER (EMERGENCY)
Facility: HOSPITAL | Age: 26
Discharge: HOME | End: 2021-09-16
Attending: EMERGENCY MEDICINE | Admitting: EMERGENCY MEDICINE

## 2021-09-16 ENCOUNTER — TELEMEDICINE (OUTPATIENT)
Dept: CARDIOLOGY | Facility: CLINIC | Age: 26
End: 2021-09-16

## 2021-09-16 VITALS
HEIGHT: 69 IN | TEMPERATURE: 99.2 F | WEIGHT: 160 LBS | SYSTOLIC BLOOD PRESSURE: 139 MMHG | HEART RATE: 103 BPM | BODY MASS INDEX: 23.7 KG/M2 | RESPIRATION RATE: 20 BRPM | OXYGEN SATURATION: 99 % | DIASTOLIC BLOOD PRESSURE: 89 MMHG

## 2021-09-16 VITALS
OXYGEN SATURATION: 98 % | TEMPERATURE: 100.5 F | HEART RATE: 103 BPM | BODY MASS INDEX: 23.55 KG/M2 | WEIGHT: 159 LBS | HEIGHT: 69 IN

## 2021-09-16 DIAGNOSIS — R00.2 PALPITATIONS: Primary | ICD-10-CM

## 2021-09-16 DIAGNOSIS — J12.82 PNEUMONIA DUE TO COVID-19 VIRUS: Primary | ICD-10-CM

## 2021-09-16 DIAGNOSIS — U07.1 PNEUMONIA DUE TO COVID-19 VIRUS: Primary | ICD-10-CM

## 2021-09-16 LAB
ANION GAP SERPL CALCULATED.3IONS-SCNC: 13 MMOL/L (ref 5–15)
BASOPHILS # BLD AUTO: 0.03 10*3/MM3 (ref 0–0.2)
BASOPHILS NFR BLD AUTO: 0.5 % (ref 0–1.5)
BUN SERPL-MCNC: 10 MG/DL (ref 6–20)
BUN/CREAT SERPL: 16.4 (ref 7–25)
CALCIUM SPEC-SCNC: 9.4 MG/DL (ref 8.6–10.5)
CHLORIDE SERPL-SCNC: 103 MMOL/L (ref 98–107)
CO2 SERPL-SCNC: 25 MMOL/L (ref 22–29)
CREAT SERPL-MCNC: 0.61 MG/DL (ref 0.57–1)
D-DIMER, QUANTITATIVE (MAD,POW, STR): <270 NG/ML (FEU) (ref 0–470)
DEPRECATED RDW RBC AUTO: 44.3 FL (ref 37–54)
EOSINOPHIL # BLD AUTO: 0.02 10*3/MM3 (ref 0–0.4)
EOSINOPHIL NFR BLD AUTO: 0.4 % (ref 0.3–6.2)
ERYTHROCYTE [DISTWIDTH] IN BLOOD BY AUTOMATED COUNT: 14.6 % (ref 12.3–15.4)
GFR SERPL CREATININE-BSD FRML MDRD: 119 ML/MIN/1.73
GLUCOSE SERPL-MCNC: 100 MG/DL (ref 65–99)
HCG SERPL QL: NEGATIVE
HCT VFR BLD AUTO: 40.2 % (ref 34–46.6)
HGB BLD-MCNC: 13 G/DL (ref 12–15.9)
IMM GRANULOCYTES # BLD AUTO: 0.01 10*3/MM3 (ref 0–0.05)
IMM GRANULOCYTES NFR BLD AUTO: 0.2 % (ref 0–0.5)
LYMPHOCYTES # BLD AUTO: 1.09 10*3/MM3 (ref 0.7–3.1)
LYMPHOCYTES NFR BLD AUTO: 19.8 % (ref 19.6–45.3)
MCH RBC QN AUTO: 27 PG (ref 26.6–33)
MCHC RBC AUTO-ENTMCNC: 32.3 G/DL (ref 31.5–35.7)
MCV RBC AUTO: 83.4 FL (ref 79–97)
MONOCYTES # BLD AUTO: 0.66 10*3/MM3 (ref 0.1–0.9)
MONOCYTES NFR BLD AUTO: 12 % (ref 5–12)
NEUTROPHILS NFR BLD AUTO: 3.69 10*3/MM3 (ref 1.7–7)
NEUTROPHILS NFR BLD AUTO: 67.1 % (ref 42.7–76)
NRBC BLD AUTO-RTO: 0 /100 WBC (ref 0–0.2)
PLATELET # BLD AUTO: 157 10*3/MM3 (ref 140–450)
PMV BLD AUTO: 10.3 FL (ref 6–12)
POTASSIUM SERPL-SCNC: 3.4 MMOL/L (ref 3.5–5.2)
RBC # BLD AUTO: 4.82 10*6/MM3 (ref 3.77–5.28)
SODIUM SERPL-SCNC: 141 MMOL/L (ref 136–145)
WBC # BLD AUTO: 5.5 10*3/MM3 (ref 3.4–10.8)

## 2021-09-16 PROCEDURE — 99213 OFFICE O/P EST LOW 20 MIN: CPT | Performed by: NURSE PRACTITIONER

## 2021-09-16 PROCEDURE — 99283 EMERGENCY DEPT VISIT LOW MDM: CPT

## 2021-09-16 PROCEDURE — 85025 COMPLETE CBC W/AUTO DIFF WBC: CPT | Performed by: EMERGENCY MEDICINE

## 2021-09-16 PROCEDURE — 85379 FIBRIN DEGRADATION QUANT: CPT | Performed by: EMERGENCY MEDICINE

## 2021-09-16 PROCEDURE — 84703 CHORIONIC GONADOTROPIN ASSAY: CPT | Performed by: EMERGENCY MEDICINE

## 2021-09-16 PROCEDURE — 80048 BASIC METABOLIC PNL TOTAL CA: CPT | Performed by: EMERGENCY MEDICINE

## 2021-09-16 PROCEDURE — 71045 X-RAY EXAM CHEST 1 VIEW: CPT

## 2021-09-16 RX ORDER — ALBUTEROL SULFATE 90 UG/1
2 AEROSOL, METERED RESPIRATORY (INHALATION) EVERY 4 HOURS PRN
Qty: 6.7 G | Refills: 0 | Status: SHIPPED | OUTPATIENT
Start: 2021-09-16 | End: 2021-09-20

## 2021-09-16 RX ORDER — MULTIPLE VITAMINS W/ MINERALS TAB 9MG-400MCG
1 TAB ORAL DAILY
Qty: 21 TABLET | Refills: 0 | Status: SHIPPED | OUTPATIENT
Start: 2021-09-16 | End: 2022-04-12

## 2021-09-16 RX ORDER — POTASSIUM CHLORIDE 750 MG/1
20 CAPSULE, EXTENDED RELEASE ORAL ONCE
Status: COMPLETED | OUTPATIENT
Start: 2021-09-16 | End: 2021-09-16

## 2021-09-16 RX ORDER — ALBUTEROL SULFATE 90 UG/1
2 AEROSOL, METERED RESPIRATORY (INHALATION) EVERY 4 HOURS PRN
Qty: 18 G | Refills: 11 | Status: SHIPPED | OUTPATIENT
Start: 2021-09-16 | End: 2023-03-08 | Stop reason: SDUPTHER

## 2021-09-16 RX ORDER — DEXTROMETHORPHAN HYDROBROMIDE AND PROMETHAZINE HYDROCHLORIDE 15; 6.25 MG/5ML; MG/5ML
5 SYRUP ORAL 4 TIMES DAILY PRN
Qty: 118 ML | Refills: 0 | Status: SHIPPED | OUTPATIENT
Start: 2021-09-16 | End: 2021-09-27

## 2021-09-16 RX ORDER — NAPROXEN SODIUM 550 MG/1
550 TABLET ORAL 2 TIMES DAILY PRN
Qty: 20 TABLET | Refills: 0 | Status: SHIPPED | OUTPATIENT
Start: 2021-09-16 | End: 2021-09-27

## 2021-09-16 RX ORDER — SODIUM CHLORIDE 0.9 % (FLUSH) 0.9 %
10 SYRINGE (ML) INJECTION AS NEEDED
Status: DISCONTINUED | OUTPATIENT
Start: 2021-09-16 | End: 2021-09-17 | Stop reason: HOSPADM

## 2021-09-16 RX ADMIN — POTASSIUM CHLORIDE 20 MEQ: 750 CAPSULE, EXTENDED RELEASE ORAL at 23:11

## 2021-09-16 NOTE — PROGRESS NOTES
Palpitations (chief Complaint )      History of Present Illness    Ms. Tamayo is a 26 year old patient who presents to [facility] for abnormal EKG & palpitations. She has had an abnormal EKG in the past with an Echo for definitive diagnosis.     Palpitations feel like a pounding in her chest versus a fast heart beat.     Can make her out of breath at times. This is related to her anxiety. She will also have some dizziness with this.     This just started back again after being absent for several years.     Last episode was 5 days ago. The episodes can happen for minutes to hours. They tend to ruin her day when she has them.     9/16/21: MyChart Video visit secondary to her being on quarantine for COVID 19. She tested positive yesterday, he child before that.   You have chosen to receive care through a telehealth visit.  Do you consent to use a video/audio connection for your medical care today? Yes    Follow up palpitations. PVCs noted on monitor. Triggered 6 times, all PVCs. There were not enough PVCs to count or create a burden. She appears to only have a few and feel each one. Recommended Magnesium glycinate.      Cardiac Risk Factors:  The ASCVD Risk score ([place] Jr., et al., 2013) failed to calculate for the following reasons:    The 2013 ASCVD risk score is only valid for ages 40 to 79      Past Medical History:   Diagnosis Date   • Abnormal EKG 8/18/2021   • Abnormal Pap smear of cervix    • Acute allergic reaction    • Acute bronchitis    • Acute pharyngitis    • Agoraphobia with panic attacks    • Allergic rhinitis    • Anemia    • Anxiety    • Anxiety state    • Asthma     Stable   • Back strain    • Constipation    • Cough    • Disturbance of skin sensation    • Gestational hypertension     with second pregnancy   • Headache    • History of transfusion     after second delivery   • HPV (human papilloma virus) infection    • Hx of preeclampsia, prior pregnancy, currently pregnant 1/2/2018   • Irregular periods     • Irritable bowel syndrome with constipation    • Low back pain    • Lumbosacral dysfunction    • Neck pain    • Palpitations    • Preeclampsia    • Rh negative state in antepartum period, third trimester 12/7/2017   • Rhinitis    • Severe depression (CMS/HCC)    • Spasm     cervical spasm   • Spinal headache    • Upper respiratory infection    • Urinary tract infection 7/14/2017   • Vaginal irritation    • Vitreous floaters     prob, not seen on exam   • Wheezing      Past Surgical History:   Procedure Laterality Date   • PROCEDURE GENERIC CONVERTED  02/01/2016    Physical Therapy Consult   • WISDOM TOOTH EXTRACTION       Social History     Socioeconomic History   • Marital status:      Spouse name: Not on file   • Number of children: Not on file   • Years of education: Not on file   • Highest education level: Not on file   Tobacco Use   • Smoking status: Never Smoker   • Smokeless tobacco: Never Used   Vaping Use   • Vaping Use: Never used   Substance and Sexual Activity   • Alcohol use: No   • Drug use: No   • Sexual activity: Yes     Partners: Male     Birth control/protection: None     Comment: last pap smear 4/16/19 negative      Family History   Problem Relation Age of Onset   • Heart disease Mother    • Hypertension Mother    • Hyperthyroidism Mother    • Kidney failure Sister    • Heart failure Sister    • COPD Sister    • Cancer Other    • Diabetes Other    • Hypertension Other    • Stroke Other    • Thyroid disease Other    • Gallbladder disease Other         Gallstones   • Cervical cancer Maternal Grandmother    • Diabetes Maternal Grandmother    • No Known Problems Son    • Febrile seizures Daughter    • COPD Father    • Diabetes Paternal Grandmother    • Cirrhosis Paternal Grandmother    • Parkinsonism Maternal Grandfather    • Heart disease Maternal Grandfather    • Hypothyroidism Sister    • No Known Problems Son        ALLERGIES:  Allergies   Allergen Reactions   • Banana Angioedema and  Hives     Itchy throat   • Latex Hives and Angioedema       Advance Care Planning   ACP discussion was declined by the patient. Patient does not have an advance directive, declines further assistance.       Review of Systems   Constitutional: Negative for chills, decreased appetite and fever.   HENT: Negative.    Eyes: Negative.    Cardiovascular: Positive for palpitations. Negative for chest pain, claudication, dyspnea on exertion, irregular heartbeat and leg swelling.   Respiratory: Negative for cough, shortness of breath and wheezing.    Endocrine: Negative.    Skin: Negative for dry skin, flushing and rash.   Musculoskeletal: Negative for falls and myalgias.   Gastrointestinal: Negative for abdominal pain, change in bowel habit and melena.   Genitourinary: Negative for frequency and hematuria.   Neurological: Negative for dizziness, light-headedness, loss of balance and weakness.   Psychiatric/Behavioral: Negative for altered mental status and memory loss. The patient is not nervous/anxious.        Current Outpatient Medications   Medication Sig Dispense Refill   • albuterol sulfate  (90 Base) MCG/ACT inhaler Inhale 2 puffs Every 4 (Four) Hours As Needed for Wheezing. 18 g 11   • beclomethasone (Qvar) 40 MCG/ACT inhaler Inhale 2 puffs 2 (Two) Times a Day. 8.6 g 11   • clonazePAM (KlonoPIN) 0.5 MG tablet Take 1 tablet by mouth 2 (Two) Times a Day As Needed for Anxiety. 60 tablet 0   • EPINEPHrine (EPIPEN 2-EMILIANA) 0.3 MG/0.3ML solution auto-injector injection as needed.     • escitalopram (Lexapro) 5 MG tablet Take 1 tablet by mouth Daily. 30 tablet 11   • fluticasone (FLONASE) 50 MCG/ACT nasal spray 1 spray into the nostril(s) as directed by provider Daily. 16 g 0   • Linzess 290 MCG capsule capsule Take 1 capsule by mouth Every Morning Before Breakfast. 30 capsule 5   • methylPREDNISolone (MEDROL) 4 MG dose pack Take as directed on package instructions. 21 tablet 1     No current facility-administered  "medications for this visit.       OBJECTIVE:    Physical Exam:   Constitutional:       General: Not in acute distress.     Appearance: Well-developed.   HENT:      Head: Normocephalic and atraumatic.   Neck:      Vascular: No JVD.   Pulmonary:      Effort: Pulmonary effort is normal. No respiratory distress.      Breath sounds: Normal breath sounds. No wheezing. No rales.   Cardiovascular:      Normal rate. Regular rhythm.   Pulses:     Intact distal pulses.   Abdominal:      General: Bowel sounds are normal.      Palpations: Abdomen is soft.   Musculoskeletal: Normal range of motion.      Cervical back: Normal range of motion. Skin:     General: Skin is warm and dry.      Findings: No erythema.   Neurological:      Mental Status: Alert and oriented to person, place, and time.   Psychiatric:         Behavior: Behavior normal.         Thought Content: Thought content normal.         Judgment: Judgment normal.       Vitals:    21 1311   Pulse: 103   Temp: 100.5 °F (38.1 °C)   SpO2: 98%   Weight: 72.1 kg (159 lb)   Height: 175.3 cm (69\")       DATA REVIEWED by me:   Results for orders placed in visit on 14    Echo - Converted    [facility]      PT NAME:  ENEDELIA SEN  [facility]  MR NUMBER:  [id number]  :  1995  ECHOCARDIOGRAM      MEDICAL OFFICE BUILDING  DATE:  2014    PRIMARY CARE PHYSICIAN:    ORDERING DOCTOR:  JOSE CARLOS TALAVERA    REASON FOR ECHO:           INTERPRETING  Palpitations               CARDIOLOGIST:  RONY LONDON MD    PRIOR ECHO:  No            DATE OF PRIOR ECHO:    PATIENT PROCEDURE #:       LOCATION OF STUDY:  [address] [place] [address]      QUANTITATIVE MEASUREMENTS    LA =    35     (<40)     LA/AO Ratio  Ao =    24     (<37)     AoV Open     19   (>15)  IVS=    7      (<11)     LVPW         7    (<11)  LVEDd=  50     (38-56)   LVEDs        36   (22-40)  RV=            (<26)     LVOT         2.2  EF=   "   55-60  %         %F.S.             (24-46)    Max. Pulmonary Gradient  3  mmHg  RV Systolic Pressure        mmHg    MV Area               cm2  Mean MV Gradient      mmHg  MV p 1/2 t        54  msec    LONG (Doppler)     3  cm2  LONG (Planimeter)     cm2  Max. AV Gradient  8  mmHg  Mean AV Gradient  5  mmHg  AI p 1/2 t           msec      DESCRIPTION:  A routine full transthoracic echocardiogram was performed  with the color flow Doppler. The study is technically adequate.    FINDINGS:  The left ventricle is normal in size and shape with normal  function. Ejection fraction is estimated at 55% to 60%. The left  ventricle wall motion is normal.    Diastolic function normal.      Pulmonary inflow normal.    The right ventricle is normal in size and shape with normal systolic  function.    The left and right atrium are normal in appearance.    The left ventricle outflow tract, sinuses of Valsalva, sinotubular  junction, and tubular ascending aorta are normal in size. Suprasternal  views of the aortic arch were obtained and were satisfactory. The  proximal and mid aortic arch are normal in size. No abnormality in  color flow Doppler to suggest coarctation.    The mitral valve is normal in appearance.    Aortic valve was normal and trileaflet with normal leaflet excursion.    The tricuspid valve is normal. There was an insufficient jet of  tricuspid regurgitation to calculate pulmonary artery pressures.    The pulmonary valve is normal. Pulmonary artery is normal.    Interatrial septum appears intact by color flow Doppler.    Pericardium normal. No pericardial effusion.    The IVC is normal with appropriate inspiratory collapse showing right  atrial pressure is normal.    Extracardiac normal no pleural effusion.    CONCLUSIONS:  1. Normal left ventricular function with an estimated ejection fraction  of 55% to 60% without regional wall motion abnormalities.  2. Normal right ventricular size with normal function.  3. No  significant valvular regurgitation or stenosis.      RONY LONDON MD  Electronically Signed  10/01/2014 11:06:46  By MD CHAYO LEOS/montez  Dictated:   09/30/2014 1347  Transcribed:   10/01/2014 0814  cc:   JOSE CARLOS TALAVERA  Page #page      No radiology results for the last 30 days.       Labs Reviewed by me: BMP, CBC, LIPID, TSH  Lab Results   Component Value Date    GLUCOSE 76 08/13/2021    CALCIUM 9.7 08/13/2021     08/13/2021    K 4.0 08/13/2021    CO2 26.0 08/13/2021     08/13/2021    BUN 11 08/13/2021    CREATININE 0.76 08/13/2021    EGFRIFNONA 92 08/13/2021    BCR 14.5 08/13/2021    ANIONGAP 11.0 08/13/2021     Lab Results   Component Value Date    WBC 5.20 08/13/2021    HGB 13.2 08/13/2021    HCT 41.2 08/13/2021    MCV 83.9 08/13/2021     08/13/2021     No results found for: CHOL  No results found for: TRIG  No results found for: HDL  No components found for: LDLCALC  No results found for: LDL  No results found for: HDLLDLRATIO  No components found for: CHOLHDL  Lab Results   Component Value Date    TSH 1.050 08/13/2021     No results found for: PROBNP               The following portions of the patient's history were reviewed and updated as appropriate: allergies, current medications, past family history, past medical history, past social history, past surgical history and problem list.  Old records that were reviewed and pertinent information is included in the above objective data.     ASSESSMENT/PLAN:       Diagnosis Plan   1. Palpitations  Benign premature ventricular beats.   Discussed the benign nature of the majority of causes of palpitations.   Discussed lifestyle modifications including reducing caffeine intake, reducing stress, and increasing exercise tolerance.  Reassurance given to patient.    Normal monitor. Triggers were with PVCs, some bigeminy.   Comes in spells, not happening every day. No burden.          Patient's Body mass index is 23.48 kg/m².  indicating that she is within normal range (BMI 18.5-24.9). No BMI management plan needed..    Follow up as needed.             This document has been electronically signed by JOSE CARLOS Virgen on September 16, 2021 13:31 CDT

## 2021-09-17 ENCOUNTER — HOSPITAL ENCOUNTER (EMERGENCY)
Facility: HOSPITAL | Age: 26
Discharge: HOME | End: 2021-09-17
Admitting: NURSE PRACTITIONER

## 2021-09-17 VITALS
HEART RATE: 93 BPM | TEMPERATURE: 98 F | WEIGHT: 159 LBS | BODY MASS INDEX: 23.55 KG/M2 | DIASTOLIC BLOOD PRESSURE: 90 MMHG | RESPIRATION RATE: 18 BRPM | SYSTOLIC BLOOD PRESSURE: 131 MMHG | HEIGHT: 69 IN | OXYGEN SATURATION: 100 %

## 2021-09-17 PROCEDURE — M0245 HC IV INFUSION, BAMLANIVIMAB AND ETESEVIMAB, 2100 MG: HCPCS | Performed by: NURSE PRACTITIONER

## 2021-09-17 PROCEDURE — 25010000006 INJECTION, BAMLANIVIMAB AND ETESEVIMAB, 2100 MG: Performed by: NURSE PRACTITIONER

## 2021-09-17 PROCEDURE — M0245 INACTIVE - HC IV INFUSION, BAMLANIVIMAB AND ETESEVIMAB, 2100 MG: HCPCS | Performed by: NURSE PRACTITIONER

## 2021-09-17 PROCEDURE — 99202 OFFICE O/P NEW SF 15 MIN: CPT

## 2021-09-17 RX ORDER — DIPHENHYDRAMINE HCL 50 MG
50 CAPSULE ORAL ONCE AS NEEDED
Status: DISCONTINUED | OUTPATIENT
Start: 2021-09-17 | End: 2021-09-17 | Stop reason: HOSPADM

## 2021-09-17 RX ORDER — DIPHENHYDRAMINE HYDROCHLORIDE 50 MG/ML
50 INJECTION INTRAMUSCULAR; INTRAVENOUS ONCE AS NEEDED
Status: DISCONTINUED | OUTPATIENT
Start: 2021-09-17 | End: 2021-09-17 | Stop reason: HOSPADM

## 2021-09-17 RX ORDER — METHYLPREDNISOLONE SODIUM SUCCINATE 125 MG/2ML
125 INJECTION, POWDER, LYOPHILIZED, FOR SOLUTION INTRAMUSCULAR; INTRAVENOUS ONCE AS NEEDED
Status: DISCONTINUED | OUTPATIENT
Start: 2021-09-17 | End: 2021-09-17 | Stop reason: HOSPADM

## 2021-09-17 RX ORDER — SODIUM CHLORIDE 9 MG/ML
30 INJECTION, SOLUTION INTRAVENOUS ONCE
Status: COMPLETED | OUTPATIENT
Start: 2021-09-17 | End: 2021-09-17

## 2021-09-17 RX ORDER — EPINEPHRINE 1 MG/ML
0.3 INJECTION, SOLUTION INTRAMUSCULAR; SUBCUTANEOUS ONCE AS NEEDED
Status: DISCONTINUED | OUTPATIENT
Start: 2021-09-17 | End: 2021-09-17 | Stop reason: HOSPADM

## 2021-09-17 RX ADMIN — SODIUM CHLORIDE: 9 INJECTION, SOLUTION INTRAVENOUS at 14:23

## 2021-09-17 RX ADMIN — SODIUM CHLORIDE 30 ML: 9 INJECTION, SOLUTION INTRAVENOUS at 14:44

## 2021-09-17 NOTE — ED PROVIDER NOTES
Subjective   25yo female presents ED c/o 2d hx nonproductive cough/congestion; pt with recent COVID-19 (+) test confirmed 09.14.2021.  Pt reports 1d hx pleuritic posterior thoracic back discomfort.  ROS neg n/v/d.      History provided by:  Patient  URI  Presenting symptoms: congestion and cough    Severity:  Moderate  Duration:  2 days      Review of Systems   Constitutional: Negative.    HENT: Positive for congestion.    Respiratory: Positive for cough.    Cardiovascular: Positive for chest pain.   Gastrointestinal: Negative.    Genitourinary: Negative.    Musculoskeletal: Negative.    Allergic/Immunologic: Negative for immunocompromised state.   All other systems reviewed and are negative.      Past Medical History:   Diagnosis Date   • Abnormal EKG 8/18/2021   • Abnormal Pap smear of cervix    • Acute allergic reaction    • Acute bronchitis    • Acute pharyngitis    • Agoraphobia with panic attacks    • Allergic rhinitis    • Anemia    • Anxiety    • Anxiety state    • Asthma     Stable   • Back strain    • Constipation    • Cough    • Disturbance of skin sensation    • Gestational hypertension     with second pregnancy   • Headache    • History of transfusion     after second delivery   • HPV (human papilloma virus) infection    • Hx of preeclampsia, prior pregnancy, currently pregnant 1/2/2018   • Irregular periods    • Irritable bowel syndrome with constipation    • Low back pain    • Lumbosacral dysfunction    • Neck pain    • Palpitations    • Preeclampsia    • Rh negative state in antepartum period, third trimester 12/7/2017   • Rhinitis    • Severe depression (CMS/HCC)    • Spasm     cervical spasm   • Spinal headache    • Upper respiratory infection    • Urinary tract infection 7/14/2017   • Vaginal irritation    • Vitreous floaters     prob, not seen on exam   • Wheezing        Allergies   Allergen Reactions   • Banana Angioedema and Hives     Itchy throat   • Latex Hives and Angioedema       Past  Surgical History:   Procedure Laterality Date   • PROCEDURE GENERIC CONVERTED  02/01/2016    Physical Therapy Consult   • WISDOM TOOTH EXTRACTION         Family History   Problem Relation Age of Onset   • Heart disease Mother    • Hypertension Mother    • Hyperthyroidism Mother    • Kidney failure Sister    • Heart failure Sister    • COPD Sister    • Cancer Other    • Diabetes Other    • Hypertension Other    • Stroke Other    • Thyroid disease Other    • Gallbladder disease Other         Gallstones   • Cervical cancer Maternal Grandmother    • Diabetes Maternal Grandmother    • No Known Problems Son    • Febrile seizures Daughter    • COPD Father    • Diabetes Paternal Grandmother    • Cirrhosis Paternal Grandmother    • Parkinsonism Maternal Grandfather    • Heart disease Maternal Grandfather    • Hypothyroidism Sister    • No Known Problems Son        Social History     Socioeconomic History   • Marital status:      Spouse name: Not on file   • Number of children: Not on file   • Years of education: Not on file   • Highest education level: Not on file   Tobacco Use   • Smoking status: Never Smoker   • Smokeless tobacco: Never Used   Vaping Use   • Vaping Use: Never used   Substance and Sexual Activity   • Alcohol use: No   • Drug use: No   • Sexual activity: Yes     Partners: Male     Birth control/protection: None     Comment: last pap smear 4/16/19 negative            Objective   Physical Exam  Vitals and nursing note reviewed.   Constitutional:       Appearance: Normal appearance.   HENT:      Head: Normocephalic and atraumatic.      Right Ear: Tympanic membrane, ear canal and external ear normal.      Left Ear: Tympanic membrane, ear canal and external ear normal.      Mouth/Throat:      Mouth: Mucous membranes are moist.   Eyes:      Pupils: Pupils are equal, round, and reactive to light.   Cardiovascular:      Rate and Rhythm: Normal rate and regular rhythm.      Pulses: Normal pulses.      Heart  sounds: Normal heart sounds.   Pulmonary:      Effort: Pulmonary effort is normal. No respiratory distress.      Breath sounds: Normal breath sounds. No wheezing, rhonchi or rales.   Abdominal:      General: Abdomen is flat. Bowel sounds are normal. There is no distension.      Palpations: Abdomen is soft.      Tenderness: There is no abdominal tenderness. There is no guarding or rebound.   Musculoskeletal:         General: No swelling or deformity. Normal range of motion.      Cervical back: Normal range of motion and neck supple. No rigidity.   Lymphadenopathy:      Cervical: No cervical adenopathy.   Skin:     General: Skin is warm and dry.   Neurological:      General: No focal deficit present.      Mental Status: She is alert and oriented to person, place, and time.      GCS: GCS eye subscore is 4. GCS verbal subscore is 5. GCS motor subscore is 6.         Procedures           ED Course      Labs Reviewed   BASIC METABOLIC PANEL - Abnormal; Notable for the following components:       Result Value    Glucose 100 (*)     Potassium 3.4 (*)     All other components within normal limits    Narrative:     GFR Normal >60  Chronic Kidney Disease <60  Kidney Failure <15     HCG, SERUM, QUALITATIVE - Normal   D-DIMER, QUANTITATIVE - Normal    Narrative:     Dimer values <500 ng/ml FEU are FDA approved as aid in diagnosis of deep venous thrombosis and pulmonary embolism.  This test should not be used in an exclusion strategy with pretest probability alone.    A recent guideline regarding diagnosis for pulmonary thromboembolism recommends an adjusted exclusion criterion of age x 10 ng/ml FEU for patients >50 years of age ([employer] Intern Med 2015; 163: 701-711).     CBC WITH AUTO DIFFERENTIAL - Normal   CBC AND DIFFERENTIAL    Narrative:     The following orders were created for panel order CBC & Differential.  Procedure                               Abnormality         Status                     ---------                                -----------         ------                     CBC Auto Differential[[id number]]        Normal              Final result                 Please view results for these tests on the individual orders.                                          MDM    Final diagnoses:   Pneumonia due to COVID-19 virus       ED Disposition  ED Disposition     ED Disposition Condition Comment    Discharge Linnea Gotti, APRN  [address]  [address]  [facility] [address]  [phone]    In 1 day           Medication List      New Prescriptions    multivitamin with minerals tablet tablet  Take 1 tablet by mouth Daily.     naproxen sodium 550 MG tablet  Commonly known as: ANAPROX  Take 1 tablet by mouth 2 (Two) Times a Day As Needed for Mild Pain .     promethazine-dextromethorphan 6.25-15 MG/5ML syrup  Commonly known as: PROMETHAZINE-DM  Take 5 mL by mouth 4 (Four) Times a Day As Needed for Cough.        Changed    * albuterol sulfate  (90 Base) MCG/ACT inhaler  Commonly known as: PROVENTIL HFA;VENTOLIN HFA;PROAIR HFA  Inhale 2 puffs Every 4 (Four) Hours As Needed for Wheezing.  What changed: Another medication with the same name was added. Make sure you understand how and when to take each.     * albuterol sulfate  (90 Base) MCG/ACT inhaler  Commonly known as: PROVENTIL HFA;VENTOLIN HFA;PROAIR HFA  Inhale 2 puffs Every 4 (Four) Hours As Needed for Shortness of Air.  What changed: You were already taking a medication with the same name, and this prescription was added. Make sure you understand how and when to take each.         * This list has 2 medication(s) that are the same as other medications prescribed for you. Read the directions carefully, and ask your doctor or other care provider to review them with you.               Where to Get Your Medications      These medications were sent to [facility] [address], [place]  [facility] [address] - [phone]  -  [phone]  [facility], [place] [address]    Phone: [phone]   · albuterol sulfate  (90 Base) MCG/ACT inhaler  · multivitamin with minerals tablet tablet  · naproxen sodium 550 MG tablet  · promethazine-dextromethorphan 6.25-15 MG/5ML syrup          Earl Smith MD  09/16/21 [id number]

## 2021-09-17 NOTE — ED NOTES
Pt here with pain in back when breathing, pt covid + as of mateus 09/14/2021.     Jamison Freitas, RN  09/16/21 2102

## 2021-09-17 NOTE — DISCHARGE INSTRUCTIONS
Return ED chest pain, shortness of air, vomiting, dehydration, worse condition, any other concerns

## 2021-09-20 DIAGNOSIS — U07.1 PNEUMONIA DUE TO COVID-19 VIRUS: Primary | ICD-10-CM

## 2021-09-20 DIAGNOSIS — J12.82 PNEUMONIA DUE TO COVID-19 VIRUS: Primary | ICD-10-CM

## 2021-09-20 RX ORDER — FLUCONAZOLE 150 MG/1
TABLET ORAL
Qty: 3 TABLET | Refills: 0 | Status: SHIPPED | OUTPATIENT
Start: 2021-09-20 | End: 2021-09-20

## 2021-09-27 ENCOUNTER — OFFICE VISIT (OUTPATIENT)
Dept: FAMILY MEDICINE CLINIC | Facility: CLINIC | Age: 26
End: 2021-09-27

## 2021-09-27 VITALS
HEIGHT: 69 IN | SYSTOLIC BLOOD PRESSURE: 115 MMHG | DIASTOLIC BLOOD PRESSURE: 85 MMHG | OXYGEN SATURATION: 99 % | HEART RATE: 80 BPM | WEIGHT: 161.9 LBS | BODY MASS INDEX: 23.98 KG/M2

## 2021-09-27 DIAGNOSIS — U07.1 COVID-19: Primary | ICD-10-CM

## 2021-09-27 DIAGNOSIS — R05.9 COUGH: ICD-10-CM

## 2021-09-27 DIAGNOSIS — R53.83 MALAISE AND FATIGUE: ICD-10-CM

## 2021-09-27 DIAGNOSIS — R53.81 MALAISE AND FATIGUE: ICD-10-CM

## 2021-09-27 PROCEDURE — 99213 OFFICE O/P EST LOW 20 MIN: CPT | Performed by: NURSE PRACTITIONER

## 2021-09-27 NOTE — PROGRESS NOTES
Chief Complaint   Patient presents with   • Follow-up   • covid     Subjective   Nirmalasireal Tamayo is a 26 y.o. female.           Presents with recheck from covid -reports feeling better     Fatigue  This is a new problem. The problem occurs daily. The problem has been gradually improving. Associated symptoms include congestion and fatigue. Pertinent negatives include no abdominal pain, anorexia, arthralgias, change in bowel habit, chest pain, chills, coughing, diaphoresis, fever, headaches, joint swelling, myalgias, nausea, neck pain, numbness, rash, sore throat, swollen glands, urinary symptoms, vertigo, visual change, vomiting or weakness. She has tried rest (steroids and antibiotics ) for the symptoms. The treatment provided mild relief.   Cough  This is a new problem. The current episode started 1 to 4 weeks ago. The problem has been gradually improving. The cough is non-productive. Associated symptoms include ear congestion, postnasal drip and rhinorrhea. Pertinent negatives include no chest pain, chills, eye redness, fever, headaches, myalgias, rash, sore throat, shortness of breath, sweats, weight loss or wheezing. The treatment provided mild relief. Her past medical history is significant for asthma. There is no history of bronchiectasis, bronchitis, COPD, emphysema, environmental allergies or pneumonia.        The following portions of the patient's history were reviewed and updated as appropriate: allergies, current medications, past social history and problem list.    Review of Systems   Constitutional: Positive for activity change and fatigue. Negative for chills, diaphoresis, fever and weight loss.   HENT: Positive for congestion, postnasal drip and rhinorrhea. Negative for sore throat.    Eyes: Negative.  Negative for photophobia, pain, discharge, redness, itching and visual disturbance.   Respiratory: Negative.  Negative for apnea, cough, choking, chest tightness, shortness of breath and  "wheezing.    Cardiovascular: Negative.  Negative for chest pain.   Gastrointestinal: Negative.  Negative for abdominal pain, anorexia, change in bowel habit, nausea and vomiting.   Endocrine: Negative.  Negative for cold intolerance, heat intolerance, polydipsia, polyphagia and polyuria.   Genitourinary: Negative.  Negative for difficulty urinating and dyspareunia.   Musculoskeletal: Negative for arthralgias, joint swelling, myalgias and neck pain.   Skin: Negative.  Negative for rash.   Allergic/Immunologic: Negative.  Negative for environmental allergies.   Neurological: Negative.  Negative for vertigo, weakness, numbness and headaches.   Hematological: Negative.    Psychiatric/Behavioral: Negative.        Objective   /85 (BP Location: Left arm)   Pulse 80   Ht 175.3 cm (69\")   Wt 73.4 kg (161 lb 14.4 oz)   SpO2 99%   BMI 23.91 kg/m²   Physical Exam  Vitals and nursing note reviewed.   Constitutional:       General: She is not in acute distress.     Appearance: Normal appearance. She is not ill-appearing, toxic-appearing or diaphoretic.   HENT:      Head: Normocephalic.      Right Ear: Tympanic membrane normal. There is no impacted cerumen.      Left Ear: There is no impacted cerumen.      Nose: Nose normal.      Mouth/Throat:      Mouth: Mucous membranes are moist.   Eyes:      Extraocular Movements: Extraocular movements intact.      Pupils: Pupils are equal, round, and reactive to light.   Cardiovascular:      Rate and Rhythm: Normal rate.      Pulses: Normal pulses.      Heart sounds: Normal heart sounds. No murmur heard.   No friction rub.   Pulmonary:      Effort: Pulmonary effort is normal. No respiratory distress.      Breath sounds: Normal breath sounds. No stridor. No wheezing or rhonchi.   Abdominal:      General: Abdomen is flat. There is no distension.      Palpations: Abdomen is soft. There is no mass.      Tenderness: There is no abdominal tenderness.      Hernia: No hernia is present. "   Musculoskeletal:         General: No swelling, tenderness, deformity or signs of injury.      Cervical back: Normal range of motion. No spasms, tenderness or bony tenderness.      Right lower leg: No edema.      Left lower leg: No edema.   Skin:     General: Skin is warm.      Coloration: Skin is not jaundiced or pale.      Findings: No bruising, erythema, lesion or rash.   Neurological:      General: No focal deficit present.      Mental Status: She is alert and oriented to person, place, and time.      Cranial Nerves: No cranial nerve deficit.      Sensory: No sensory deficit.      Motor: No weakness.      Coordination: Coordination normal.      Gait: Gait normal.      Deep Tendon Reflexes: Reflexes normal.   Psychiatric:         Mood and Affect: Mood normal.         Behavior: Behavior normal.              Assessment/Plan     Problems Addressed this Visit        Pulmonary and Pneumonias    Cough      Other Visit Diagnoses     COVID-19    -  Primary    Malaise and fatigue          Diagnoses       Codes Comments    COVID-19    -  Primary ICD-10-CM: U07.1  ICD-9-CM: 079.89     Malaise and fatigue     ICD-10-CM: R53.81, R53.83  ICD-9-CM: 780.79     Cough     ICD-10-CM: R05  ICD-9-CM: 786.2          No orders of the defined types were placed in this encounter.    Current Outpatient Medications on File Prior to Visit   Medication Sig Dispense Refill   • albuterol sulfate  (90 Base) MCG/ACT inhaler Inhale 2 puffs Every 4 (Four) Hours As Needed for Wheezing. 18 g 11   • beclomethasone (Qvar) 40 MCG/ACT inhaler Inhale 2 puffs 2 (Two) Times a Day. 8.6 g 11   • clonazePAM (KlonoPIN) 0.5 MG tablet Take 1 tablet by mouth 2 (Two) Times a Day As Needed for Anxiety. 60 tablet 0   • EPINEPHrine (EPIPEN 2-EMILIANA) 0.3 MG/0.3ML solution auto-injector injection as needed.     • escitalopram (Lexapro) 5 MG tablet Take 1 tablet by mouth Daily. 30 tablet 11   • fluticasone (FLONASE) 50 MCG/ACT nasal spray 1 spray into the  nostril(s) as directed by provider Daily. 16 g 0   • Linzess 290 MCG capsule capsule Take 1 capsule by mouth Every Morning Before Breakfast. 30 capsule 5   • multivitamin with minerals tablet tablet Take 1 tablet by mouth Daily. 21 tablet 0   • [DISCONTINUED] montelukast (Singulair) 10 MG tablet Take 1 tablet by mouth Every Night. 30 tablet 11   • [DISCONTINUED] naproxen sodium (ANAPROX) 550 MG tablet Take 1 tablet by mouth 2 (Two) Times a Day As Needed for Mild Pain . 20 tablet 0   • [DISCONTINUED] promethazine-dextromethorphan (PROMETHAZINE-DM) 6.25-15 MG/5ML syrup Take 5 mL by mouth 4 (Four) Times a Day As Needed for Cough. 118 mL 0     No current facility-administered medications on file prior to visit.       15 minutes   Follow Up   No follow-ups on file.     Continue vitamins for now, chest xray reviewed, no changes needed at this time, continue to follow up as directed, use inhaler as directed  Study Result    Narrative & Impression   EXAM DESCRIPTION:      XR CHEST 2 VW     CLINICAL HISTORY:      26 years  Female  covid positive, painful breathing right lung,  U07.1 COVID-19 R07.1 Chest pain on breathing     COMPARISON:      September 16, 2021     TECHNIQUE:      Two views-PA and lateral radiographs of the chest     FINDINGS:      The lungs are well-expanded and clear. The cardiac silhouette and  pulmonary vasculature are within normal limits. There are no  pleural effusions.     IMPRESSION:     1. No radiographic evidence of acute cardiopulmonary disease.

## 2021-10-13 RX ORDER — CLARITHROMYCIN 500 MG/1
500 TABLET, COATED ORAL 2 TIMES DAILY
Qty: 20 TABLET | Refills: 0 | Status: SHIPPED | OUTPATIENT
Start: 2021-10-13 | End: 2021-12-17

## 2021-12-20 ENCOUNTER — OFFICE VISIT (OUTPATIENT)
Dept: FAMILY MEDICINE CLINIC | Facility: CLINIC | Age: 26
End: 2021-12-20

## 2021-12-20 VITALS
HEIGHT: 69 IN | DIASTOLIC BLOOD PRESSURE: 62 MMHG | RESPIRATION RATE: 22 BRPM | OXYGEN SATURATION: 99 % | HEART RATE: 96 BPM | WEIGHT: 163.6 LBS | BODY MASS INDEX: 24.23 KG/M2 | SYSTOLIC BLOOD PRESSURE: 116 MMHG | TEMPERATURE: 98.2 F

## 2021-12-20 DIAGNOSIS — R05.9 COUGH: ICD-10-CM

## 2021-12-20 DIAGNOSIS — J06.9 UPPER RESPIRATORY TRACT INFECTION, UNSPECIFIED TYPE: ICD-10-CM

## 2021-12-20 DIAGNOSIS — R09.81 SINUS CONGESTION: Primary | ICD-10-CM

## 2021-12-20 LAB — SARS-COV-2 N GENE RESP QL NAA+PROBE: NOT DETECTED

## 2021-12-20 PROCEDURE — 96372 THER/PROPH/DIAG INJ SC/IM: CPT | Performed by: NURSE PRACTITIONER

## 2021-12-20 PROCEDURE — 87635 SARS-COV-2 COVID-19 AMP PRB: CPT | Performed by: NURSE PRACTITIONER

## 2021-12-20 PROCEDURE — 99213 OFFICE O/P EST LOW 20 MIN: CPT | Performed by: NURSE PRACTITIONER

## 2021-12-20 RX ORDER — DEXTROMETHORPHAN HYDROBROMIDE AND PROMETHAZINE HYDROCHLORIDE 15; 6.25 MG/5ML; MG/5ML
5 SYRUP ORAL 4 TIMES DAILY PRN
Qty: 180 ML | Refills: 0 | Status: SHIPPED | OUTPATIENT
Start: 2021-12-20 | End: 2022-03-04

## 2021-12-20 RX ORDER — TRIAMCINOLONE ACETONIDE 40 MG/ML
80 INJECTION, SUSPENSION INTRA-ARTICULAR; INTRAMUSCULAR ONCE
Status: COMPLETED | OUTPATIENT
Start: 2021-12-20 | End: 2021-12-20

## 2021-12-20 RX ORDER — CEFPROZIL 500 MG/1
500 TABLET, FILM COATED ORAL 2 TIMES DAILY
Qty: 14 TABLET | Refills: 0 | Status: SHIPPED | OUTPATIENT
Start: 2021-12-20 | End: 2022-02-17

## 2021-12-20 RX ADMIN — TRIAMCINOLONE ACETONIDE 80 MG: 40 INJECTION, SUSPENSION INTRA-ARTICULAR; INTRAMUSCULAR at 13:16

## 2021-12-20 NOTE — PROGRESS NOTES
Chief Complaint  URI    Subjective          Nirmala Tamayo presents to [facility] [place] with coughing and sinus drainage. Has a productive cough of green sputum. She has had a few temparature readings around 99 in the last week. Exposed to Covid in the past 14 days, however she had covid in September.       URI   This is a new problem. The current episode started in the past 7 days. The problem has been waxing and waning. There has been no fever. Associated symptoms include coughing, rhinorrhea and a sore throat. Pertinent negatives include no chest pain, congestion, diarrhea, dysuria, ear pain, headaches, nausea or wheezing. The treatment provided no relief.     Outpatient Medications Prior to Visit   Medication Sig Dispense Refill   • albuterol sulfate  (90 Base) MCG/ACT inhaler Inhale 2 puffs Every 4 (Four) Hours As Needed for Wheezing. 18 g 11   • beclomethasone (Qvar) 40 MCG/ACT inhaler Inhale 2 puffs 2 (Two) Times a Day. 8.6 g 11   • cetirizine (zyrTEC) 10 MG tablet Take 1 tablet by mouth Daily. 30 tablet 0   • clonazePAM (KlonoPIN) 0.5 MG tablet Take 1 tablet by mouth 2 (Two) Times a Day As Needed for Anxiety. 60 tablet 0   • EPINEPHrine (EPIPEN 2-EMILIANA) 0.3 MG/0.3ML solution auto-injector injection as needed.     • escitalopram (Lexapro) 5 MG tablet Take 1 tablet by mouth Daily. 30 tablet 11   • fluconazole (DIFLUCAN) 150 MG tablet TAKE 1 TABLET BY MOUTH ONCE DAILY FOR 3 DAYS     • fluticasone (FLONASE) 50 MCG/ACT nasal spray 2 sprays into the nostril(s) as directed by provider Daily. 16 g 0   • Linzess 290 MCG capsule capsule Take 1 capsule by mouth Every Morning Before Breakfast. 30 capsule 5   • multivitamin with minerals tablet tablet Take 1 tablet by mouth Daily. 21 tablet 0     No facility-administered medications prior to visit.       Review of Systems   Constitutional: Negative for activity change, appetite change, chills and fever.  "  HENT: Positive for postnasal drip, rhinorrhea and sore throat. Negative for congestion, ear pain and trouble swallowing.    Eyes: Negative for discharge, itching and visual disturbance.   Respiratory: Positive for cough. Negative for apnea, shortness of breath and wheezing.    Cardiovascular: Negative for chest pain and leg swelling.   Gastrointestinal: Negative for abdominal distention, constipation, diarrhea and nausea.   Endocrine: Negative for cold intolerance, heat intolerance and polyuria.   Genitourinary: Negative for dysuria, frequency and urgency.   Musculoskeletal: Negative for arthralgias, back pain and myalgias.   Skin: Negative for color change, pallor and wound.   Neurological: Negative for dizziness, seizures, syncope, weakness, light-headedness and headaches.   Psychiatric/Behavioral: Negative for agitation, confusion and sleep disturbance. The patient is not nervous/anxious.          Objective   Vital Signs:   Visit Vitals  /62 (BP Location: Right arm, Patient Position: Sitting, Cuff Size: Adult)   Pulse 96   Temp 98.2 °F (36.8 °C)   Resp 22   Ht 175.3 cm (69\")   Wt 74.2 kg (163 lb 9.6 oz)   LMP 12/10/2021 (Approximate)   SpO2 99%   BMI 24.16 kg/m²     Physical Exam  Vitals and nursing note reviewed.   Constitutional:       Appearance: She is well-developed.   HENT:      Head: Normocephalic and atraumatic.      Right Ear: Tympanic membrane normal.      Left Ear: Tympanic membrane normal.      Nose: Congestion and rhinorrhea present.   Eyes:      General: Lids are normal.      Conjunctiva/sclera: Conjunctivae normal.   Neck:      Thyroid: No thyroid mass or thyromegaly.      Trachea: Trachea normal. No tracheal tenderness.   Cardiovascular:      Rate and Rhythm: Normal rate.      Pulses: Normal pulses.      Heart sounds: Normal heart sounds.   Pulmonary:      Effort: Pulmonary effort is normal. No respiratory distress.      Breath sounds: Normal breath sounds. No wheezing.   Abdominal:      " General: There is no distension.      Palpations: Abdomen is soft. There is no mass.   Musculoskeletal:         General: Normal range of motion.      Cervical back: Normal range of motion. No edema.   Lymphadenopathy:      Head:      Right side of head: No submental, submandibular or tonsillar adenopathy.      Left side of head: No submental, submandibular or tonsillar adenopathy.   Skin:     General: Skin is warm and dry.      Coloration: Skin is not pale.      Findings: No abrasion, erythema or lesion.   Neurological:      Mental Status: She is alert and oriented to person, place, and time.   Psychiatric:         Mood and Affect: Mood is not anxious. Affect is not inappropriate.         Speech: Speech normal.         Behavior: Behavior normal.         Thought Content: Thought content normal.         Judgment: Judgment normal. Judgment is not impulsive.        Result Review :                 Assessment and Plan    Diagnoses and all orders for this visit:    1. Sinus congestion (Primary)  -     COVID-19, [facility] IN-HOUSE, NP SWAB IN TRANSPORT MEDIA 8-10 HR TAT - Swab, Nasopharynx; Future  -     triamcinolone acetonide (KENALOG-40) injection 80 mg  -     promethazine-dextromethorphan (PROMETHAZINE-DM) 6.25-15 MG/5ML syrup; Take 5 mL by mouth 4 (Four) Times a Day As Needed for Cough.  Dispense: 180 mL; Refill: 0  -     cefprozil (CEFZIL) 500 MG tablet; Take 1 tablet by mouth 2 (Two) Times a Day.  Dispense: 14 tablet; Refill: 0    2. Cough  -     COVID-19, [facility] IN-HOUSE, NP SWAB IN TRANSPORT MEDIA 8-10 HR TAT - Swab, Nasopharynx; Future  -     triamcinolone acetonide (KENALOG-40) injection 80 mg  -     promethazine-dextromethorphan (PROMETHAZINE-DM) 6.25-15 MG/5ML syrup; Take 5 mL by mouth 4 (Four) Times a Day As Needed for Cough.  Dispense: 180 mL; Refill: 0  -     cefprozil (CEFZIL) 500 MG tablet; Take 1 tablet by mouth 2 (Two) Times a Day.  Dispense: 14 tablet; Refill: 0    3. Upper respiratory tract infection,  unspecified type  -     triamcinolone acetonide (KENALOG-40) injection 80 mg  -     promethazine-dextromethorphan (PROMETHAZINE-DM) 6.25-15 MG/5ML syrup; Take 5 mL by mouth 4 (Four) Times a Day As Needed for Cough.  Dispense: 180 mL; Refill: 0  -     cefprozil (CEFZIL) 500 MG tablet; Take 1 tablet by mouth 2 (Two) Times a Day.  Dispense: 14 tablet; Refill: 0      We will call with COVID results- instructed to quarantine until results are back     Kenalog today in office    Start Cough syrup    Cefzil for 7 days    Please call the office if you have any issues or if symptoms persist or worsen       I spent 25 minutes caring for Nirmala on this date of service. This time includes time spent by me in the following activities:preparing for the visit, performing a medically appropriate examination and/or evaluation , counseling and educating the patient/family/caregiver, ordering medications, tests, or procedures and documenting information in the medical record  Follow Up   Return if symptoms worsen or fail to improve, for Next scheduled follow up.  Patient was given instructions and counseling regarding her condition or for health maintenance advice. Please see specific information pulled into the AVS if appropriate.           This document has been electronically signed by JOSE CARLOS Alex on December 20, 2021 13:09 CST  Answers for HPI/ROS submitted by the patient on 12/20/2021  What is the primary reason for your visit?: Cough

## 2021-12-29 ENCOUNTER — TELEPHONE (OUTPATIENT)
Dept: FAMILY MEDICINE CLINIC | Facility: CLINIC | Age: 26
End: 2021-12-29

## 2021-12-29 RX ORDER — EPINEPHRINE 0.3 MG/.3ML
INJECTION SUBCUTANEOUS
Qty: 2 EACH | Refills: 1 | Status: SHIPPED | OUTPATIENT
Start: 2021-12-29 | End: 2023-03-03 | Stop reason: SDUPTHER

## 2021-12-29 NOTE — TELEPHONE ENCOUNTER
----- Message from Nirmala Tamayo sent at 2021  3:01 PM CST -----  Regarding: Covid 19 booster  I forgot to ask, I’m needing new Epi pens mine are

## 2022-01-03 ENCOUNTER — PATIENT MESSAGE (OUTPATIENT)
Dept: FAMILY MEDICINE CLINIC | Facility: CLINIC | Age: 27
End: 2022-01-03

## 2022-01-05 NOTE — TELEPHONE ENCOUNTER
From: Nirmala Tamayo  To: JOSE CARLOS Skaggs  Sent: 1/3/2022 9:56 PM CST  Subject: Migraines    I have migraines a couple times a month sometimes they get bad and I have to go to urgent care ([facility]) for a toradol shot and that’s the only thing that works. I’m having them again and I saw a commercial on Botox cosmetic I guess in your forehead that could help? Should I make an appt with you to talk about this?

## 2022-01-11 DIAGNOSIS — N76.0 ACUTE VAGINITIS: Primary | ICD-10-CM

## 2022-01-11 RX ORDER — METRONIDAZOLE 500 MG/1
500 TABLET ORAL 2 TIMES DAILY
Qty: 14 TABLET | Refills: 0 | Status: SHIPPED | OUTPATIENT
Start: 2022-01-11 | End: 2022-01-18

## 2022-01-11 RX ORDER — FLUCONAZOLE 150 MG/1
TABLET ORAL
Qty: 2 TABLET | Refills: 1 | Status: SHIPPED | OUTPATIENT
Start: 2022-01-11 | End: 2022-03-04

## 2022-01-13 PROCEDURE — 87635 SARS-COV-2 COVID-19 AMP PRB: CPT | Performed by: NURSE PRACTITIONER

## 2022-02-14 ENCOUNTER — TELEPHONE (OUTPATIENT)
Dept: FAMILY MEDICINE CLINIC | Facility: CLINIC | Age: 27
End: 2022-02-14

## 2022-02-14 DIAGNOSIS — R10.84 GENERALIZED ABDOMINAL PAIN: Primary | ICD-10-CM

## 2022-02-14 NOTE — TELEPHONE ENCOUNTER
Incoming Refill Request      Medication requested (name and dose):none    Pharmacy where request should be sent: none    Additional details provided by patient: none    Best call back number: none    Does the patient have less than a 3 day supply:  [] Yes  [x] No    Katya Lorenzo  02/14/22, 15:29 CST

## 2022-02-14 NOTE — TELEPHONE ENCOUNTER
Nirmala is asking for a referral to Gastro.   Said, she has seen you for the problem already and just wants a referral.

## 2022-02-14 NOTE — NON STRESS TEST
Nirmala Tamayo, a  at 35w6d with an ELISE of 3/2/2018, by Last Menstrual Period, was seen at [facility] for a nonstress test.    Chief Complaint   Patient presents with   • Back Pain     Pt c/o having back pain that radiated to abdomen on and off since yesterday but getting worse today.  Denies decreased fetal movement or vaginal bleeding.         Interpretation A  Nonstress Test Interpretation A: Reactive ([id number] : Antoinette Carlin RN)    No contractions, pt given tylenol and instructed to call or return if fever >100.4 or not controlled with tylenol.  Pt request flu swab - tested negative   Epidural

## 2022-02-16 ENCOUNTER — OFFICE VISIT (OUTPATIENT)
Dept: GASTROENTEROLOGY | Facility: CLINIC | Age: 27
End: 2022-02-16

## 2022-02-16 VITALS
HEART RATE: 75 BPM | SYSTOLIC BLOOD PRESSURE: 125 MMHG | HEIGHT: 69 IN | BODY MASS INDEX: 23.99 KG/M2 | WEIGHT: 162 LBS | DIASTOLIC BLOOD PRESSURE: 79 MMHG

## 2022-02-16 DIAGNOSIS — R10.30 LOWER ABDOMINAL PAIN: Primary | ICD-10-CM

## 2022-02-16 DIAGNOSIS — K59.04 CHRONIC IDIOPATHIC CONSTIPATION: ICD-10-CM

## 2022-02-16 DIAGNOSIS — K92.1 BLOOD IN STOOL: ICD-10-CM

## 2022-02-16 PROCEDURE — 99214 OFFICE O/P EST MOD 30 MIN: CPT | Performed by: NURSE PRACTITIONER

## 2022-02-16 RX ORDER — DEXTROSE AND SODIUM CHLORIDE 5; .45 G/100ML; G/100ML
30 INJECTION, SOLUTION INTRAVENOUS CONTINUOUS PRN
Status: CANCELLED | OUTPATIENT
Start: 2022-03-07

## 2022-02-16 RX ORDER — POLYETHYLENE GLYCOL 3350, SODIUM SULFATE, SODIUM CHLORIDE, POTASSIUM CHLORIDE, ASCORBIC ACID, SODIUM ASCORBATE 7.5-2.691G
1000 KIT ORAL EVERY 12 HOURS
Qty: 1000 ML | Refills: 0 | Status: SHIPPED | OUTPATIENT
Start: 2022-02-16 | End: 2022-03-02

## 2022-02-16 NOTE — PATIENT INSTRUCTIONS
MyPlate from USDA    MyPlate is an outline of a general healthy diet based on the 2010 Dietary Guidelines for Americans, from the U.S. Department of Agriculture (USDA). It sets guidelines for how much food you should eat from each food group based on your age, sex, and level of physical activity.  What are tips for following MyPlate?  To follow MyPlate recommendations:  · Eat a wide variety of fruits and vegetables, grains, and protein foods.  · Serve smaller portions and eat less food throughout the day.  · Limit portion sizes to avoid overeating.  · Enjoy your food.  · Get at least 150 minutes of exercise every week. This is about 30 minutes each day, 5 or more days per week.  It can be difficult to have every meal look like MyPlate. Think about MyPlate as eating guidelines for an entire day, rather than each individual meal.  Fruits and vegetables  · Make half of your plate fruits and vegetables.  · Eat many different colors of fruits and vegetables each day.  · For a 2,000 calorie daily food plan, eat:  ? 2½ cups of vegetables every day.  ? 2 cups of fruit every day.  · 1 cup is equal to:  ? 1 cup raw or cooked vegetables.  ? 1 cup raw fruit.  ? 1 medium-sized orange, apple, or banana.  ? 1 cup 100% fruit or vegetable juice.  ? 2 cups raw leafy greens, such as lettuce, spinach, or kale.  ? ½ cup dried fruit.  Grains  · One fourth of your plate should be grains.  · Make at least half of the grains you eat each day whole grains.  · For a 2,000 calorie daily food plan, eat 6 oz of grains every day.  · 1 oz is equal to:  ? 1 slice bread.  ? 1 cup cereal.  ? ½ cup cooked rice, cereal, or pasta.  Protein  · One fourth of your plate should be protein.  · Eat a wide variety of protein foods, including meat, poultry, fish, eggs, beans, nuts, and tofu.  · For a 2,000 calorie daily food plan, eat 5½ oz of protein every day.  · 1 oz is equal to:  ? 1 oz meat, poultry, or fish.  ? ¼ cup cooked beans.  ? 1 egg.  ? ½ oz nuts  or seeds.  ? 1 Tbsp peanut butter.  Dairy  · Drink fat-free or low-fat (1%) milk.  · Eat or drink dairy as a side to meals.  · For a 2,000 calorie daily food plan, eat or drink 3 cups of dairy every day.  · 1 cup is equal to:  ? 1 cup milk, yogurt, cottage cheese, or soy milk (soy beverage).  ? 2 oz processed cheese.  ? 1½ oz natural cheese.  Fats, oils, salt, and sugars  · Only small amounts of oils are recommended.  · Avoid foods that are high in calories and low in nutritional value (empty calories), like foods high in fat or added sugars.  · Choose foods that are low in salt (sodium). Choose foods that have less than 140 milligrams (mg) of sodium per serving.  · Drink water instead of sugary drinks. Drink enough water each day to keep your urine pale yellow.  Where to find support  · Work with your health care provider or a nutrition specialist (dietitian) to develop a customized eating plan that is right for you.  · Download an [place]ney (mobile application) to help you track your daily food intake.  Where to find more information  · Go to ChooseMyPlate.gov for more information.  Summary  · MyPlate is a general guideline for healthy eating from the USDA. It is based on the 2010 Dietary Guidelines for Americans.  · In general, fruits and vegetables should take up ½ of your plate, grains should take up ¼ of your plate, and protein should take up ¼ of your plate.  This information is not intended to replace advice given to you by your health care provider. Make sure you discuss any questions you have with your health care provider.  Document Revised: 05/21/2020 Document Reviewed: 03/19/2018  Elsevier Patient Education © 2021 Elsevier Inc.

## 2022-02-16 NOTE — PROGRESS NOTES
Chief Complaint   Patient presents with   • Abdominal Pain   • Rectal Bleeding       Subjective    Nirmala Tamayo is a 27 y.o. female. she is being seen for consultation today at the request of SHAWNA Skagsg*     27-year-old female presents to discuss abdominal pain rectal bleeding and abnormal bowel habits.  Reports she has always had issues with constipation she will intermittently wake up with abdominal cramping and had episodes of diarrhea.  Has been taking Linzess 290 without much change in symptoms at all.  Reports recently when she has seen blood is been mixed in with the stool typically she sees it more on the toilet tissue attributed to hemorrhoidal bleeding with straining    Abdominal Pain  This is a chronic problem. The current episode started more than 1 year ago. The onset quality is gradual. The problem occurs intermittently. The problem has been waxing and waning. The pain is located in the generalized abdominal region. The quality of the pain is cramping. The abdominal pain does not radiate. Associated symptoms include constipation and hematochezia. Pertinent negatives include no anorexia, arthralgias, belching, diarrhea, dysuria, fever, hematuria, nausea (nausea when symptoms get severe ) or vomiting.   Rectal Bleeding  This is a new problem. The current episode started in the past 7 days. The problem has been waxing and waning. Associated symptoms include abdominal pain and fatigue. Pertinent negatives include no anorexia, arthralgias, change in bowel habit, chills, diaphoresis, fever, nausea (nausea when symptoms get severe ) or vomiting.     plan; Schedule patient for colonoscopy due to lower abdominal pain cramping blood in stool and abnormal bowel habits.  Recommend she continue Linzess daily       The following portions of the patient's history were reviewed and updated as appropriate:   Past Medical History:   Diagnosis Date   • Abnormal EKG 8/18/2021   • Abnormal Pap smear  of cervix    • Acute allergic reaction    • Acute bronchitis    • Acute pharyngitis    • Agoraphobia with panic attacks    • Allergic rhinitis    • Anemia    • Anxiety    • Anxiety state    • Asthma     Stable   • Back strain    • Constipation    • Cough    • Disturbance of skin sensation    • Gestational hypertension     with second pregnancy   • Headache    • History of transfusion     after second delivery   • HPV (human papilloma virus) infection    • Hx of preeclampsia, prior pregnancy, currently pregnant 2018   • Irregular periods    • Irritable bowel syndrome with constipation    • Low back pain    • Lumbosacral dysfunction    • Neck pain    • Palpitations    • Preeclampsia    • Rh negative state in antepartum period, third trimester 2017   • Rhinitis    • Severe depression (HCC)    • Spasm     cervical spasm   • Spinal headache    • Upper respiratory infection    • Urinary tract infection 2017   • Vaginal irritation    • Vitreous floaters     prob, not seen on exam   • Wheezing      Past Surgical History:   Procedure Laterality Date   • PROCEDURE GENERIC CONVERTED  2016    Physical Therapy Consult   • WISDOM TOOTH EXTRACTION       Family History   Problem Relation Age of Onset   • Heart disease Mother    • Hypertension Mother    • Hyperthyroidism Mother    • Kidney failure Sister    • Heart failure Sister    • COPD Sister    • Cancer Other    • Diabetes Other    • Hypertension Other    • Stroke Other    • Thyroid disease Other    • Gallbladder disease Other         Gallstones   • Cervical cancer Maternal Grandmother    • Diabetes Maternal Grandmother    • No Known Problems Son    • Febrile seizures Daughter    • COPD Father    • Diabetes Paternal Grandmother    • Cirrhosis Paternal Grandmother    • Parkinsonism Maternal Grandfather    • Heart disease Maternal Grandfather    • Hypothyroidism Sister    • No Known Problems Son      OB History        4    Para   4    Term   3        1    AB        Living   4       SAB        IAB        Ectopic        Molar        Multiple   0    Live Births   4              Prior to Admission medications    Medication Sig Start Date End Date Taking? Authorizing Provider   albuterol sulfate  (90 Base) MCG/ACT inhaler Inhale 2 puffs Every 4 (Four) Hours As Needed for Wheezing. 21  Yes Linnea Verduzco APRN   beclomethasone (Qvar) 40 MCG/ACT inhaler Inhale 2 puffs 2 (Two) Times a Day. 21  Yes Linnea Verduzco APRN   cefprozil (CEFZIL) 500 MG tablet Take 1 tablet by mouth 2 (Two) Times a Day. 21  Yes Antoinette Kraft APRN   cetirizine (zyrTEC) 10 MG tablet Take 1 tablet by mouth Daily. 21  Yes Wilmer Cash MD   clonazePAM (KlonoPIN) 0.5 MG tablet Take 1 tablet by mouth 2 (Two) Times a Day As Needed for Anxiety. 21  Yes Linnea Verduzco APRN   EPINEPHrine (EpiPen 2-Augusto) 0.3 MG/0.3ML solution auto-injector injection Inject once for allergic reaction as instructed and proceed to the nearest Emergency Room for further treatment 21  Yes Linnea Verduzco APRN   escitalopram (Lexapro) 5 MG tablet Take 1 tablet by mouth Daily. 21  Yes Linnea Verduzco APRN   fluconazole (Diflucan) 150 MG tablet Take 1 tablet by mouth today and repeat in 4 days. 22  Yes Jinny Boyd APRN   fluticasone (FLONASE) 50 MCG/ACT nasal spray 2 sprays into the nostril(s) as directed by provider Daily. 21  Yes Wilmer Cash MD   Linzess 290 MCG capsule capsule Take 1 capsule by mouth Every Morning Before Breakfast. 21  Yes Linnea Verduzco APRN   multivitamin with minerals tablet tablet Take 1 tablet by mouth Daily. 21  Yes Earl Smith MD   promethazine-dextromethorphan (PROMETHAZINE-DM) 6.25-15 MG/5ML syrup Take 5 mL by mouth 4 (Four) Times a Day As Needed for Cough. 21  Yes Antoinette Kraft APRN   montelukast (Singulair) 10 MG tablet Take 1 tablet by mouth Every  "Night. 1/28/21 8/5/21  Linnea Verduzco APRN     Allergies   Allergen Reactions   • Banana Angioedema and Hives     Itchy throat   • Latex Hives and Angioedema     Social History     Socioeconomic History   • Marital status:    Tobacco Use   • Smoking status: Never Smoker   • Smokeless tobacco: Never Used   Vaping Use   • Vaping Use: Never used   Substance and Sexual Activity   • Alcohol use: No   • Drug use: No   • Sexual activity: Yes     Partners: Male     Birth control/protection: None     Comment: last pap smear 4/16/19 negative        Review of Systems  Review of Systems   Constitutional: Positive for fatigue. Negative for activity change, appetite change, chills, diaphoresis, fever and unexpected weight change.   HENT: Negative for trouble swallowing.    Gastrointestinal: Positive for abdominal pain, blood in stool, constipation and hematochezia. Negative for abdominal distention, anal bleeding, anorexia, change in bowel habit, diarrhea, nausea (nausea when symptoms get severe ), rectal pain and vomiting.   Genitourinary: Negative for dysuria and hematuria.   Musculoskeletal: Negative for arthralgias.        /79 (BP Location: Left arm)   Pulse 75   Ht 175.3 cm (69\")   Wt 73.5 kg (162 lb)   BMI 23.92 kg/m²     Objective    Physical Exam  Constitutional:       General: She is not in acute distress.     Appearance: Normal appearance. She is normal weight. She is not ill-appearing.   HENT:      Head: Normocephalic and atraumatic.   Pulmonary:      Effort: Pulmonary effort is normal.   Abdominal:      General: Abdomen is flat. Bowel sounds are normal. There is no distension.      Palpations: Abdomen is soft. There is no mass.      Tenderness: There is no abdominal tenderness.   Neurological:      Mental Status: She is alert.       Admission on 01/13/2022, Discharged on 01/13/2022   Component Date Value Ref Range Status   • COVID19 01/13/2022 Not Detected  Not Detected - Ref. Range Final "     Assessment/Plan      1. Lower abdominal pain    2. Blood in stool    3. Chronic idiopathic constipation    .       Orders placed during this encounter include:  Orders Placed This Encounter   Procedures   • Follow Anesthesia Guidelines / Standing Orders     Standing Status:   Future   • Obtain Informed Consent     Standing Status:   Future     Order Specific Question:   Informed Consent Given For     Answer:   COLONOSCOPY       COLONOSCOPY (N/A)    Review and/or summary of lab tests, radiology, procedures, medications. Review and summary of old records and obtaining of history. The risks and benefits of my recommendations, as well as other treatment options were discussed with the patient today. Questions were answered.    New Medications Ordered This Visit   Medications   • MoviPrep 100 g reconstituted solution powder     Sig: Take 1,000 mL by mouth Every 12 (Twelve) Hours.     Dispense:  1000 mL     Refill:  0       Follow-up: Return in about 4 weeks (around 3/16/2022) for Recheck, After test.          This document has been electronically signed by JOSE CARLOS Fuentes on February 16, 2022 13:14 CST           I spent 22 minutes caring for Nirmala on this date of service. This time includes time spent by me in the following activities:preparing for the visit, reviewing tests, obtaining and/or reviewing a separately obtained history, performing a medically appropriate examination and/or evaluation , counseling and educating the patient/family/caregiver, ordering medications, tests, or procedures, referring and communicating with other health care professionals , documenting information in the medical record and care coordination    Results for orders placed or performed during the hospital encounter of 01/13/22   COVID-19, [facility] IN-HOUSE, NP SWAB IN TRANSPORT MEDIA 8-10 HR TAT - Swab, Anterior nasal    Specimen: Anterior nasal; Swab   Result Value Ref Range    COVID19 Not Detected Not Detected - Ref. Range    Results for orders placed or performed in visit on 12/20/21   COVID-19, [facility] IN-HOUSE, NP SWAB IN TRANSPORT MEDIA 8-10 HR TAT - Swab, Nasopharynx    Specimen: Nasopharynx; Swab   Result Value Ref Range    COVID19 Not Detected Not Detected - Ref. Range   Results for orders placed or performed during the hospital encounter of 12/17/21   POCT SARS-CoV-2 Antigen JEANNE    Specimen: Swab   Result Value Ref Range    SARS Antigen Not Detected Not Detected    Internal Control Passed Passed    Lot Number [id number]     Expiration Date 8/20/23    POCT Influenza A/B    Specimen: Swab   Result Value Ref Range    Rapid Influenza A Ag Negative Negative    Rapid Influenza B Ag Negative Negative    Internal Control Passed Passed    Lot Number [id number]     Expiration Date 5/7/22    Results for orders placed or performed during the hospital encounter of 10/11/21   POCT Rapid Strep A    Specimen: Swab   Result Value Ref Range    Rapid Strep A Screen Negative Negative, VALID, INVALID, Not Performed    Internal Control Passed Passed    Lot Number [id number]     Expiration Date 03/31/2022    Results for orders placed or performed during the hospital encounter of 09/16/21   CBC Auto Differential    Specimen: Blood   Result Value Ref Range    WBC 5.50 3.40 - 10.80 10*3/mm3    RBC 4.82 3.77 - 5.28 10*6/mm3    Hemoglobin 13.0 12.0 - 15.9 g/dL    Hematocrit 40.2 34.0 - 46.6 %    MCV 83.4 79.0 - 97.0 fL    MCH 27.0 26.6 - 33.0 pg    MCHC 32.3 31.5 - 35.7 g/dL    RDW 14.6 12.3 - 15.4 %    RDW-SD 44.3 37.0 - 54.0 fl    MPV 10.3 6.0 - 12.0 fL    Platelets 157 140 - 450 10*3/mm3    Neutrophil % 67.1 42.7 - 76.0 %    Lymphocyte % 19.8 19.6 - 45.3 %    Monocyte % 12.0 5.0 - 12.0 %    Eosinophil % 0.4 0.3 - 6.2 %    Basophil % 0.5 0.0 - 1.5 %    Immature Grans % 0.2 0.0 - 0.5 %    Neutrophils, Absolute 3.69 1.70 - 7.00 10*3/mm3    Lymphocytes, Absolute 1.09 0.70 - 3.10 10*3/mm3    Monocytes, Absolute 0.66 0.10 - 0.90 10*3/mm3    Eosinophils, Absolute  0.02 0.00 - 0.40 10*3/mm3    Basophils, Absolute 0.03 0.00 - 0.20 10*3/mm3    Immature Grans, Absolute 0.01 0.00 - 0.05 10*3/mm3    nRBC 0.0 0.0 - 0.2 /100 WBC   D-dimer, Quantitative    Specimen: Blood   Result Value Ref Range    D-Dimer, Quantitative <270 0 - 470 ng/mL (FEU)   hCG, Serum, Qualitative    Specimen: Blood   Result Value Ref Range    HCG Qualitative Negative Negative   Basic Metabolic Panel    Specimen: Blood   Result Value Ref Range    Glucose 100 (H) 65 - 99 mg/dL    BUN 10 6 - 20 mg/dL    Creatinine 0.61 0.57 - 1.00 mg/dL    Sodium 141 136 - 145 mmol/L    Potassium 3.4 (L) 3.5 - 5.2 mmol/L    Chloride 103 98 - 107 mmol/L    CO2 25.0 22.0 - 29.0 mmol/L    Calcium 9.4 8.6 - 10.5 mg/dL    eGFR Non African Amer 119 >60 mL/min/1.73    BUN/Creatinine Ratio 16.4 7.0 - 25.0    Anion Gap 13.0 5.0 - 15.0 mmol/L   Results for orders placed or performed during the hospital encounter of 09/14/21   COVID-19,[facility] PAN[facility](JENNIFER),[facility], NP/OP SWAB IN UTM/VTM/SALINE TRANSPORT MEDIA,24 HR TAT - Swab, Nasal Cavity    Specimen: Nasal Cavity; Swab   Result Value Ref Range    COVID19 Detected (C) Not Detected - Ref. Range   Results for orders placed or performed in visit on 08/18/21   Holter Monitor - 72 Hour Up To 15 Days   Result Value Ref Range    Target HR (85%) 165 bpm    Max. Pred. HR (100%) 194 bpm   Results for orders placed or performed during the hospital encounter of 08/17/21   POCT SARS-CoV-2 Antigen JEANNE    Specimen: Swab   Result Value Ref Range    SARS Antigen Not Detected Not Detected    Internal Control Passed Passed    Lot Number [id number]     Expiration Date 11/03/21    Results for orders placed or performed in visit on 08/13/21   CBC Auto Differential    Specimen: Blood   Result Value Ref Range    WBC 5.20 3.40 - 10.80 10*3/mm3    RBC 4.91 3.77 - 5.28 10*6/mm3    Hemoglobin 13.2 12.0 - 15.9 g/dL    Hematocrit 41.2 34.0 - 46.6 %    MCV 83.9 79.0 - 97.0 fL    MCH 26.9 26.6 - 33.0 pg    MCHC 32.0 31.5 -  35.7 g/dL    RDW 14.0 12.3 - 15.4 %    RDW-SD 42.5 37.0 - 54.0 fl    MPV 10.5 6.0 - 12.0 fL    Platelets 211 140 - 450 10*3/mm3    Neutrophil % 69.7 42.7 - 76.0 %    Lymphocyte % 20.6 19.6 - 45.3 %    Monocyte % 8.3 5.0 - 12.0 %    Eosinophil % 0.6 0.3 - 6.2 %    Basophil % 0.6 0.0 - 1.5 %    Immature Grans % 0.2 0.0 - 0.5 %    Neutrophils, Absolute 3.63 1.70 - 7.00 10*3/mm3    Lymphocytes, Absolute 1.07 0.70 - 3.10 10*3/mm3    Monocytes, Absolute 0.43 0.10 - 0.90 10*3/mm3    Eosinophils, Absolute 0.03 0.00 - 0.40 10*3/mm3    Basophils, Absolute 0.03 0.00 - 0.20 10*3/mm3    Immature Grans, Absolute 0.01 0.00 - 0.05 10*3/mm3    nRBC 0.0 0.0 - 0.2 /100 WBC   Urine Drug Screen - Urine, Clean Catch    Specimen: Urine, Clean Catch   Result Value Ref Range    THC, Screen, Urine Negative Negative    Phencyclidine (PCP), Urine Negative Negative    Cocaine Screen, Urine Negative Negative    Methamphetamine, Ur Negative Negative    Opiate Screen Negative Negative    Amphetamine Screen, Urine Negative Negative    Benzodiazepine Screen, Urine Negative Negative    Tricyclic Antidepressants Screen Negative Negative    Methadone Screen, Urine Negative Negative    Barbiturates Screen, Urine Negative Negative    Oxycodone Screen, Urine Negative Negative    Propoxyphene Screen Negative Negative    Buprenorphine, Screen, Urine Negative Negative   hCG, Serum, Qualitative    Specimen: Blood   Result Value Ref Range    HCG Qualitative Negative Negative   TSH    Specimen: Blood   Result Value Ref Range    TSH 1.050 0.270 - 4.200 uIU/mL   Iron    Specimen: Blood   Result Value Ref Range    Iron 62 37 - 145 mcg/dL   Comprehensive Metabolic Panel    Specimen: Blood   Result Value Ref Range    Glucose 76 65 - 99 mg/dL    BUN 11 6 - 20 mg/dL    Creatinine 0.76 0.57 - 1.00 mg/dL    Sodium 140 136 - 145 mmol/L    Potassium 4.0 3.5 - 5.2 mmol/L    Chloride 103 98 - 107 mmol/L    CO2 26.0 22.0 - 29.0 mmol/L    Calcium 9.7 8.6 - 10.5 mg/dL    Total  Protein 7.7 6.0 - 8.5 g/dL    Albumin 4.50 3.50 - 5.20 g/dL    ALT (SGPT) 11 1 - 33 U/L    AST (SGOT) 17 1 - 32 U/L    Alkaline Phosphatase 70 39 - 117 U/L    Total Bilirubin 0.3 0.0 - 1.2 mg/dL    eGFR Non African Amer 92 >60 mL/min/1.73    Globulin 3.2 gm/dL    A/G Ratio 1.4 g/dL    BUN/Creatinine Ratio 14.5 7.0 - 25.0    Anion Gap 11.0 5.0 - 15.0 mmol/L     *Note: Due to a large number of results and/or encounters for the requested time period, some results have not been displayed. A complete set of results can be found in Results Review.

## 2022-03-07 ENCOUNTER — HOSPITAL ENCOUNTER (OUTPATIENT)
Facility: HOSPITAL | Age: 27
Setting detail: HOSPITAL OUTPATIENT SURGERY
Discharge: HOME | End: 2022-03-07
Attending: INTERNAL MEDICINE | Admitting: INTERNAL MEDICINE

## 2022-03-07 ENCOUNTER — ANESTHESIA EVENT (OUTPATIENT)
Dept: GASTROENTEROLOGY | Facility: HOSPITAL | Age: 27
End: 2022-03-07

## 2022-03-07 ENCOUNTER — ANESTHESIA (OUTPATIENT)
Dept: GASTROENTEROLOGY | Facility: HOSPITAL | Age: 27
End: 2022-03-07

## 2022-03-07 VITALS
OXYGEN SATURATION: 97 % | HEIGHT: 69 IN | DIASTOLIC BLOOD PRESSURE: 56 MMHG | SYSTOLIC BLOOD PRESSURE: 102 MMHG | RESPIRATION RATE: 18 BRPM | HEART RATE: 69 BPM | BODY MASS INDEX: 23.55 KG/M2 | WEIGHT: 159 LBS | TEMPERATURE: 97.4 F

## 2022-03-07 DIAGNOSIS — K92.1 BLOOD IN STOOL: ICD-10-CM

## 2022-03-07 DIAGNOSIS — R10.30 LOWER ABDOMINAL PAIN: ICD-10-CM

## 2022-03-07 PROCEDURE — 45380 COLONOSCOPY AND BIOPSY: CPT | Performed by: INTERNAL MEDICINE

## 2022-03-07 PROCEDURE — 25010000002 PROPOFOL 10 MG/ML EMULSION: Performed by: NURSE ANESTHETIST, CERTIFIED REGISTERED

## 2022-03-07 RX ORDER — DEXTROSE AND SODIUM CHLORIDE 5; .45 G/100ML; G/100ML
30 INJECTION, SOLUTION INTRAVENOUS CONTINUOUS PRN
Status: DISCONTINUED | OUTPATIENT
Start: 2022-03-07 | End: 2022-03-07 | Stop reason: HOSPADM

## 2022-03-07 RX ORDER — PROPOFOL 10 MG/ML
VIAL (ML) INTRAVENOUS AS NEEDED
Status: DISCONTINUED | OUTPATIENT
Start: 2022-03-07 | End: 2022-03-07 | Stop reason: SURG

## 2022-03-07 RX ORDER — LIDOCAINE HYDROCHLORIDE 20 MG/ML
INJECTION, SOLUTION INTRAVENOUS AS NEEDED
Status: DISCONTINUED | OUTPATIENT
Start: 2022-03-07 | End: 2022-03-07 | Stop reason: SURG

## 2022-03-07 RX ADMIN — PROPOFOL 70 MG: 10 INJECTION, EMULSION INTRAVENOUS at 14:12

## 2022-03-07 RX ADMIN — DEXTROSE AND SODIUM CHLORIDE 30 ML/HR: 5; 450 INJECTION, SOLUTION INTRAVENOUS at 12:42

## 2022-03-07 RX ADMIN — PROPOFOL 30 MG: 10 INJECTION, EMULSION INTRAVENOUS at 14:16

## 2022-03-07 RX ADMIN — PROPOFOL 50 MG: 10 INJECTION, EMULSION INTRAVENOUS at 14:14

## 2022-03-07 RX ADMIN — PROPOFOL 100 MG: 10 INJECTION, EMULSION INTRAVENOUS at 14:10

## 2022-03-07 RX ADMIN — LIDOCAINE HYDROCHLORIDE 100 MG: 20 INJECTION, SOLUTION INTRAVENOUS at 14:10

## 2022-03-07 RX ADMIN — PROPOFOL 50 MG: 10 INJECTION, EMULSION INTRAVENOUS at 14:18

## 2022-03-07 NOTE — EXTERNAL PATIENT INSTRUCTIONS
Patient Education   Table of Contents       Colon Biopsy       Colonoscopy, Adult       Hemorrhoids       Monitored Anesthesia Care, Care After     To view videos and all your education online visit,   [email or web address]   or scan this QR code with your smartphone.                  Colon Biopsy     A colon biopsy is a procedure to remove tissue samples from the colon, which is part of the large intestine. The tissue that is removed can then be looked at under a microscope for disease.    The biopsy will be done during a colonoscopy. A colonoscopy involves inserting a colonoscope into the opening between the buttocks (anus) and then into the rectum, colon, and other parts of the large intestine. A colonoscope is a flexible tube that has oil or gel on it (is lubricated) and a camera on the end. The scope allows your health care provider to see the inside of your colon. A biopsy may be done to help screen for or diagnose medical problems. These include:       Tumors.       A type of abnormal growth called polyps.       Inflammation.       Areas of bleeding.     Tell a health care provider about:         Any allergies you have.       All medicines you are taking, including vitamins, herbs, eye drops, creams, and over-the-counter medicines.       Any problems you or family members have had with anesthetic medicines.       Any blood disorders you have.       Any surgeries you have had.       Any medical conditions you have.       Whether you are pregnant or may be pregnant.     What are the risks?    Generally, this is a safe procedure. However, problems may occur, including:       Infection.       Bleeding.       Allergic reactions to medicines.       Damage to nearby structures or organs.       A hole (perforation) in the colon that must be fixed with surgery.     What happens before the procedure?   Eating and drinking restrictions    Follow instructions from your health care provider about eating and  drinking, which may include:       Several days before the procedure - follow a low-fiber diet. Avoid nuts, seeds, dried fruit, raw fruits, and vegetables.       1?3 days before the procedure - follow a clear liquid diet. This diet is limited to liquids or certain soft or frozen foods that you can see through. These liquids or foods include clear broth or bouillon, black coffee or plain tea, clear fruit juice, clear soft drinks or sports drinks, gelatin dessert, and flavored ice. Avoid any liquids, gelatins, or frozen items that contain red or purple dye.       On the day of the procedure - do not eat or drink anything during the 2 hours before the procedure, or within the time period that your health care provider recommends.     Bowel prep    If you were prescribed an oral bowel prep to clean out your colon:       Take it as told by your health care provider. Starting the day before your procedure, you will need to drink a large amount of medicated liquid. The liquid will cause you to have multiple loose bowel movements until your stool (feces) is almost clear or light green.      If your skin or the anus gets irritated from diarrhea, you may use these to relieve the irritation:       Medicated wipes, such as adult wet wipes with aloe and vitamin E.       A skin-soothing product such as petroleum jelly.     If you vomit while drinking the bowel prep, take a break for up to 60 minutes and then begin the bowel prep again. Call your health care provider if you continue to vomit and cannot take the bowel prep without vomiting.   Medicines    Ask your health care provider about:       Changing or stopping your regular medicines. This is especially important if you are taking diabetes medicines, arthritis medicines, or blood thinners.       Taking medicines such as aspirin and ibuprofen. These medicines can thin your blood. Do not  take these medicines unless your health care provider tells you to take them.        Taking over-the-counter medicines, vitamins, herbs, and supplements.       When to stop iron therapy given by mouth or IV before the colonoscopy and biopsy (usually 4?5 days before).     General instructions         Plan to have a responsible adult take you home from the hospital or clinic.       Plan to have a responsible adult care for you for the time you are told after you leave the hospital or clinic. This is important.       Ask your health care provider how your biopsy site will be marked.       Ask your health care provider what steps will be taken to prevent infection.       Ask your health care provider for what time period you should avoid tobacco use, including chewing tobacco.     What happens during the procedure?            You will be asked to lie on your side with your knees bent toward your chest.       An IV will be inserted into one of your veins.       You will be given a medicine to help you relax (sedative).       Your health care provider will lubricate the colonoscope.       The colonoscope will be gently eased through the rectum and moved to the area of abnormal tissue.       Air will be delivered into the colon to keep it open. You may feel some pressure or cramping.       The camera on the colonoscope may be used to take images during the procedure.       Surgical instruments will be inserted through the colonoscope to perform the biopsy.       One or more tissue samples will be clipped from your colon. The sample or samples will be sent to the lab for testing.     The procedure may vary among health care providers and hospitals.     What happens after the procedure?         Your blood pressure, heart rate, breathing rate, and blood oxygen level will be monitored until you leave the hospital or clinic.       You may have cramping, bloating, and gas in your abdomen.       There may be some bleeding from the anus.       If you were given a sedative during the procedure, it can affect you  for several hours. Do not  drive or operate machinery until your health care provider says that it is safe.       It is up to you to get the results of your procedure. Ask your health care provider, or the department that is doing the procedure, when your results will be ready.     Summary         A colon biopsy is a procedure to remove tissue samples from the colon, which is part of the large intestine. The tissue that is removed can then be looked at under a microscope for disease.       Before the procedure, take the oral bowel prep as told by your health care provider to clean out your colon, if the prep was prescribed for you.       Plan to have a responsible adult take you home from the hospital or clinic.       Plan to have a responsible adult care for you for the time you are told after you leave the hospital or clinic. This is important.     This information is not intended to replace advice given to you by your health care provider. Make sure you discuss any questions you have with your health care provider.     Document Released: 05/29/2018Document Revised: 04/07/2021Document Reviewed: 04/07/2021     [employer] Patient Education ? 2021 [employer]         Colonoscopy, Adult     A colonoscopy is a procedure to look at the entire large intestine. This procedure is done using a long, thin, flexible tube that has a camera on the end.    You may have a colonoscopy:       As a part of normal colorectal screening.      If you have certain symptoms, such as:       A low number of red blood cells in your blood (anemia).       Diarrhea that does not go away.       Pain in your abdomen.       Blood in your stool.      A colonoscopy can help screen for and diagnose medical problems, including:       Tumors.       Extra tissue that grows where mucus forms (polyps).       Inflammation.       Areas of bleeding.     Tell your health care provider about:         Any allergies you have.       All medicines you are taking,  including vitamins, herbs, eye drops, creams, and over-the-counter medicines.       Any problems you or family members have had with anesthetic medicines.       Any blood disorders you have.       Any surgeries you have had.       Any medical conditions you have.       Any problems you have had with having bowel movements.       Whether you are pregnant or may be pregnant.     What are the risks?    Generally, this is a safe procedure. However, problems may occur, including:       Bleeding.       Damage to your intestine.       Allergic reactions to medicines given during the procedure.       Infection. This is rare.     What happens before the procedure?   Eating and drinking restrictions    Follow instructions from your health care provider about eating or drinking restrictions, which may include:      A few days before the procedure:       Follow a low-fiber diet.       Avoid nuts, seeds, dried fruit, raw fruits, and vegetables.      1?3 days before the procedure:       Eat only gelatin dessert or ice pops.       Drink only clear liquids, such as water, clear juice, clear broth or bouillon, black coffee or tea, or clear soft drinks or sports drinks.       Avoid liquids that contain red or purple dye.      The day of the procedure:      Do not  eat solid foods. You may continue to drink clear liquids until up to 2 hours before the procedure.      Do not  eat or drink anything starting 2 hours before the procedure, or within the time period that your health care provider recommends.     Bowel prep    If you were prescribed a bowel prep to take by mouth (orally) to clean out your colon:       Take it as told by your health care provider. Starting the day before your procedure, you will need to drink a large amount of liquid medicine. The liquid will cause you to have many bowel movements of loose stool until your stool becomes almost clear or light green.      If your skin or the opening between the buttocks (anus)  gets irritated from diarrhea, you may relieve the irritation using:       Wipes with medicine in them, such as adult wet wipes with aloe and vitamin E.       A product to soothe skin, such as petroleum jelly.      If you vomit while drinking the bowel prep:       Take a break for up to 60 minutes.       Begin the bowel prep again.       Call your health care provider if you keep vomiting or you cannot take the bowel prep without vomiting.      To clean out your colon, you may also be given:       Laxative medicines. These help you have a bowel movement.       Instructions for enema use. An enema is liquid medicine injected into your rectum.     Medicines    Ask your health care provider about:       Changing or stopping your regular medicines or supplements. This is especially important if you are taking iron supplements, diabetes medicines, or blood thinners.       Taking medicines such as aspirin and ibuprofen. These medicines can thin your blood. Do not  take these medicines unless your health care provider tells you to take them.       Taking over-the-counter medicines, vitamins, herbs, and supplements.     General instructions         Ask your health care provider what steps will be taken to help prevent infection. These may include washing skin with a germ-killing soap.       Plan to have someone take you home from the hospital or clinic.     What happens during the procedure?            An IV will be inserted into one of your veins.      You may be given one or more of the following:       A medicine to help you relax (sedative).       A medicine to numb the area (local anesthetic).       A medicine to make you fall asleep (general anesthetic). This is rarely needed.       You will lie on your side with your knees bent.      The tube will:       Have oil or gel put on it (be lubricated).       Be inserted into your anus.       Be gently eased through all parts of your large intestine.       Air will be sent  into your colon to keep it open. This may cause some pressure or cramping.       Images will be taken with the camera and will appear on a screen.       A small tissue sample may be removed to be looked at under a microscope (biopsy). The tissue may be sent to a lab for testing if any signs of problems are found.       If small polyps are found, they may be removed and checked for cancer cells.       When the procedure is finished, the tube will be removed.     The procedure may vary among health care providers and hospitals.     What happens after the procedure?         Your blood pressure, heart rate, breathing rate, and blood oxygen level will be monitored until you leave the hospital or clinic.       You may have a small amount of blood in your stool.       You may pass gas and have mild cramping or bloating in your abdomen. This is caused by the air that was used to open your colon during the exam.      Do not  drive for 24 hours after the procedure.       It is up to you to get the results of your procedure. Ask your health care provider, or the department that is doing the procedure, when your results will be ready.     Summary         A colonoscopy is a procedure to look at the entire large intestine.       Follow instructions from your health care provider about eating and drinking before the procedure.       If you were prescribed an oral bowel prep to clean out your colon, take it as told by your health care provider.       During the colonoscopy, a flexible tube with a camera on its end is inserted into the anus and then passed into the other parts of the large intestine.     This information is not intended to replace advice given to you by your health care provider. Make sure you discuss any questions you have with your health care provider.     Document Released: 12/15/2001Document Revised: 07/10/2020Document Reviewed: 07/10/2020     Elsevier Patient Education ? 2021 [employer]         Hemorrhoids          Hemorrhoids are swollen veins that may develop:       In the butt (rectum). These are called internal hemorrhoids.       Around the opening of the butt (anus). These are called external hemorrhoids.     Hemorrhoids can cause pain, itching, or bleeding. Most of the time, they do not cause serious problems. They usually get better with diet changes, lifestyle changes, and other home treatments.     What are the causes?    This condition may be caused by:       Having trouble pooping (constipation).       Pushing hard (straining) to poop.       Watery poop (diarrhea).       Pregnancy.       Being very overweight (obese).       Sitting for long periods of time.       Heavy lifting or other activity that causes you to strain.       Anal sex.       Riding a bike for a long period of time.     What are the signs or symptoms?    Symptoms of this condition include:       Pain.       Itching or soreness in the butt.       Bleeding from the butt.       Leaking poop.       Swelling in the area.       One or more lumps around the opening of your butt.     How is this diagnosed?    A doctor can often diagnose this condition by looking at the affected area. The doctor may also:       Do an exam that involves feeling the area with a gloved hand (digital rectal exam).       Examine the area inside your butt using a small tube (anoscope).       Order blood tests. This may be done if you have lost a lot of blood.       Have you get a test that involves looking inside the colon using a flexible tube with a camera on the end (sigmoidoscopy or colonoscopy).     How is this treated?    This condition can usually be treated at home. Your doctor may tell you to change what you eat, make lifestyle changes, or try home treatments. If these do not help, procedures can be done to remove the hemorrhoids or make them smaller. These may involve:       Placing rubber bands at the base of the hemorrhoids to cut off their blood supply.        Injecting medicine into the hemorrhoids to shrink them.       Shining a type of light energy onto the hemorrhoids to cause them to fall off.       Doing surgery to remove the hemorrhoids or cut off their blood supply.     Follow these instructions at home:   Eating and drinking            Eat foods that have a lot of fiber in them. These include whole grains, beans, nuts, fruits, and vegetables.       Ask your doctor about taking products that have added fiber (fibersupplements).      Reduce the amount of fat in your diet. You can do this by:       Eating low-fat dairy products.       Eating less red meat.       Avoiding processed foods.       Drink enough fluid to keep your pee (urine) pale yellow.     Managing pain and swelling            Take a warm-water bath (sitz bath) for 20 minutes to ease pain. Do this 3?4 times a day. You may do this in a bathtub or using a portable sitz bath that fits over the toilet.      If told, put ice on the painful area. It may be helpful to use ice between your warm baths.       Put ice in a plastic bag.       Place a towel between your skin and the bag.       Leave the ice on for 20 minutes, 2?3 times a day.       General instructions        Take over-the-counter and prescription medicines only as told by your doctor.       Medicated creams and medicines may be used as told.       Exercise often. Ask your doctor how much and what kind of exercise is best for you.       Go to the bathroom when you have the urge to poop. Do not  wait.       Avoid pushing too hard when you poop.       Keep your butt dry and clean. Use wet toilet paper or moist towelettes after pooping.      Do not  sit on the toilet for a long time.       Keep all follow-up visits as told by your doctor. This is important.       Contact a doctor if you:         Have pain and swelling that do not get better with treatment or medicine.       Have trouble pooping.       Cannot poop.       Have pain or swelling outside  the area of the hemorrhoids.     Get help right away if you have:         Bleeding that will not stop.     Summary         Hemorrhoids are swollen veins in the butt or around the opening of the butt.       They can cause pain, itching, or bleeding.       Eat foods that have a lot of fiber in them. These include whole grains, beans, nuts, fruits, and vegetables.       Take a warm-water bath (sitz bath) for 20 minutes to ease pain. Do this 3?4 times a day.     This information is not intended to replace advice given to you by your health care provider. Make sure you discuss any questions you have with your health care provider.     Document Released: 09/26/2009Document Revised: 12/26/2019Document Reviewed: 05/09/2019     [employer] Patient Education ? 2021 [employer]         Monitored Anesthesia Care, Care After     This sheet gives you information about how to care for yourself after your procedure. Your health care provider may also give you more specific instructions. If you have problems or questions, contact your health care provider.   What can I expect after the procedure?    After the procedure, it is common to have:       Tiredness.       Forgetfulness about what happened after the procedure.       Impaired judgment for important decisions.       Nausea or vomiting.       Some difficulty with balance.     Follow these instructions at home:   For the time period you were told by your health care provider:               Rest as needed.      Do not  participate in activities where you could fall or become injured.      Do not  drive or use machinery.      Do not  drink alcohol.      Do not  take sleeping pills or medicines that cause drowsiness.      Do not  make important decisions or sign legal documents.      Do not  take care of children on your own.       Eating and drinking         Follow the diet that is recommended by your health care provider.       Drink enough fluid to keep your urine pale yellow.       If you vomit:       Drink water, juice, or soup when you can drink without vomiting.       Make sure you have little or no nausea before eating solid foods.     General instructions         Have a responsible adult stay with you for the time you are told. It is important to have someone help care for you until you are awake and alert.       Take over-the-counter and prescription medicines only as told by your health care provider.      If you have sleep apnea, surgery and certain medicines can increase your risk for breathing problems. Follow instructions from your health care provider about wearing your sleep device:       Anytime you are sleeping, including during daytime naps.       While taking prescription pain medicines, sleeping medicines, or medicines that make you drowsy.       Avoid smoking.       Keep all follow-up visits as told by your health care provider. This is important.       Contact a health care provider if:         You keep feeling nauseous or you keep vomiting.       You feel light-headed.       You are still sleepy or having trouble with balance after 24 hours.       You develop a rash.       You have a fever.       You have redness or swelling around the IV site.     Get help right away if:         You have trouble breathing.       You have new-onset confusion at home.     Summary         For several hours after your procedure, you may feel tired. You may also be forgetful and have poor judgment.       Have a responsible adult stay with you for the time you are told. It is important to have someone help care for you until you are awake and alert.       Rest as told. Do not  drive or operate machinery. Do not  drink alcohol or take sleeping pills.       Get help right away if you have trouble breathing, or if you suddenly become confused.     This information is not intended to replace advice given to you by your health care provider. Make sure you discuss any questions you have with  your health care provider.     Document Released: 04/09/2017Document Revised: 09/02/2021Document Reviewed: 11/19/2020     Elsevier Patient Education ? 2021 [employer]

## 2022-03-07 NOTE — ANESTHESIA POSTPROCEDURE EVALUATION
Patient: Nirmala Tamayo    Procedure Summary     Date: 03/07/22 Room / Location: [facility] ENDOSCOPY 3 / [facility] ENDOSCOPY    Anesthesia Start: 1410 Anesthesia Stop: 1424    Procedure: COLONOSCOPY (N/A ) Diagnosis:       Lower abdominal pain      Blood in stool      (Lower abdominal pain [R10.30])      (Blood in stool [K92.1])    Surgeons: Sloan Sterling MD Provider: Martha Sarah CRNA    Anesthesia Type: MAC ASA Status: 2          Anesthesia Type: MAC    Vitals  No vitals data found for the desired time range.          Post Anesthesia Care and Evaluation    Patient location during evaluation: bedside  Patient participation: waiting for patient participation  Level of consciousness: sleepy but conscious  Pain score: 0  Pain management: adequate  Airway patency: patent  Anesthetic complications: No anesthetic complications  PONV Status: none  Cardiovascular status: acceptable  Respiratory status: acceptable  Hydration status: acceptable

## 2022-03-07 NOTE — ANESTHESIA PREPROCEDURE EVALUATION
Anesthesia Evaluation     Patient summary reviewed and Nursing notes reviewed   history of anesthetic complications:  NPO Solid Status: > 8 hours  NPO Liquid Status: > 8 hours           Airway   Mallampati: II  TM distance: >3 FB  Neck ROM: full  No difficulty expected  Dental      Pulmonary - normal exam    breath sounds clear to auscultation  (+) asthma,recent URI,   Cardiovascular - negative cardio ROS and normal exam    Rhythm: regular  Rate: normal    (-) hypertension      Neuro/Psych  (+) psychiatric history Anxiety,    GI/Hepatic/Renal/Endo - negative ROS     Musculoskeletal (-) negative ROS    Abdominal  - normal exam   Substance History - negative use     OB/GYN negative ob/gyn ROS   (-)  Pregnant, Preeclampsia and history of pregnancy induced hypertension        Other                        Anesthesia Plan    ASA 2     MAC     intravenous induction     Anesthetic plan, all risks, benefits, and alternatives have been provided, discussed and informed consent has been obtained with: patient.        CODE STATUS:

## 2022-03-10 LAB
LAB AP CASE REPORT: NORMAL
PATH REPORT.FINAL DX SPEC: NORMAL

## 2022-03-15 ENCOUNTER — OFFICE VISIT (OUTPATIENT)
Dept: GASTROENTEROLOGY | Facility: CLINIC | Age: 27
End: 2022-03-15

## 2022-03-15 VITALS
BODY MASS INDEX: 24.05 KG/M2 | WEIGHT: 162.4 LBS | HEART RATE: 72 BPM | SYSTOLIC BLOOD PRESSURE: 108 MMHG | DIASTOLIC BLOOD PRESSURE: 68 MMHG | HEIGHT: 69 IN

## 2022-03-15 DIAGNOSIS — K59.04 CHRONIC IDIOPATHIC CONSTIPATION: Primary | ICD-10-CM

## 2022-03-15 PROCEDURE — 99213 OFFICE O/P EST LOW 20 MIN: CPT | Performed by: NURSE PRACTITIONER

## 2022-03-15 RX ORDER — PLECANATIDE 3 MG/1
3 TABLET ORAL DAILY
Qty: 30 TABLET | Refills: 5 | OUTPATIENT
Start: 2022-03-15 | End: 2022-04-12

## 2022-03-15 NOTE — PROGRESS NOTES
Chief Complaint   Patient presents with   • Abdominal Pain   • Rectal Bleeding       Subjective    Nirmala Tamayo is a 27 y.o. female. she is here today for follow-up.  27-year-old female presents for follow-up after colonoscopy.  Reports since taking Linzess daily lower abdominal pain has been better she has a bowel movement about 4-5 times per week denies any current abdominal pain.    Constipation  This is a chronic problem. The current episode started more than 1 year ago. The problem has been waxing and waning since onset. Her stool frequency is 2 to 3 times per week. The patient is on a high fiber diet. She exercises regularly. There has been adequate water intake. Associated symptoms include hemorrhoids. Pertinent negatives include no abdominal pain, anorexia, back pain, bloating, diarrhea, difficulty urinating, fecal incontinence, fever, flatus, hematochezia, melena, nausea, rectal pain, vomiting or weight loss.     Colonoscopy 3/7/2022 noted adequate prep normal perianal digital rectal exam hemorrhoids otherwise normal exam.  Colon biopsy and small bowel biopsy noted no pathologic alterations negative for significant inflammation or atypia repeat recommended at age 50 for surveillance       The following portions of the patient's history were reviewed and updated as appropriate:   Past Medical History:   Diagnosis Date   • Abnormal EKG 08/18/2021   • Abnormal Pap smear of cervix    • Acute allergic reaction    • Acute bronchitis    • Acute pharyngitis    • Agoraphobia with panic attacks    • Allergic rhinitis    • Anemia    • Anxiety    • Anxiety state    • Asthma     Stable   • Back strain    • Constipation    • Cough    • Disturbance of skin sensation    • Gestational hypertension     with second pregnancy   • Headache    • History of transfusion     after second delivery   • HPV (human papilloma virus) infection    • Hx of preeclampsia, prior pregnancy, currently pregnant 01/02/2018   •  Irregular periods    • Irritable bowel syndrome with constipation    • Low back pain    • Lumbosacral dysfunction    • Neck pain    • Palpitations    • Preeclampsia    • Rh negative state in antepartum period, third trimester 2017   • Rhinitis    • Severe depression (HCC)    • Spasm     cervical spasm   • Spinal headache    • Upper respiratory infection    • Urinary tract infection 2017   • Vaginal irritation    • Vitreous floaters     prob, not seen on exam   • Wheezing      Past Surgical History:   Procedure Laterality Date   • COLONOSCOPY N/A 3/7/2022    Procedure: COLONOSCOPY;  Surgeon: Sloan Sterling MD;  Location: [facility] ENDOSCOPY;  Service: Gastroenterology;  Laterality: N/A;   • PROCEDURE GENERIC CONVERTED  2016    Physical Therapy Consult   • WISDOM TOOTH EXTRACTION       Family History   Problem Relation Age of Onset   • Heart disease Mother    • Hypertension Mother    • Hyperthyroidism Mother    • Kidney failure Sister    • Heart failure Sister    • COPD Sister    • Cancer Other    • Diabetes Other    • Hypertension Other    • Stroke Other    • Thyroid disease Other    • Gallbladder disease Other         Gallstones   • Cervical cancer Maternal Grandmother    • Diabetes Maternal Grandmother    • No Known Problems Son    • Febrile seizures Daughter    • COPD Father    • Diabetes Paternal Grandmother    • Cirrhosis Paternal Grandmother    • Parkinsonism Maternal Grandfather    • Heart disease Maternal Grandfather    • Hypothyroidism Sister    • No Known Problems Son      OB History        4    Para   4    Term   3       1    AB        Living   4       SAB        IAB        Ectopic        Molar        Multiple   0    Live Births   4              Prior to Admission medications    Medication Sig Start Date End Date Taking? Authorizing Provider   albuterol sulfate  (90 Base) MCG/ACT inhaler Inhale 2 puffs Every 4 (Four) Hours As Needed for Wheezing. 21  Yes Brandt  Linnea Rios APRN   beclomethasone (Qvar) 40 MCG/ACT inhaler Inhale 2 puffs 2 (Two) Times a Day. 9/16/21  Yes Linnea Verduzco APRN   cetirizine (zyrTEC) 10 MG tablet Take 1 tablet by mouth Daily. 12/17/21  Yes Wilmer Cash MD   clonazePAM (KlonoPIN) 0.5 MG tablet Take 1 tablet by mouth 2 (Two) Times a Day As Needed for Anxiety. 8/13/21  Yes Linnea Verduzco APRN   EPINEPHrine (EpiPen 2-Augusto) 0.3 MG/0.3ML solution auto-injector injection Inject once for allergic reaction as instructed and proceed to the nearest Emergency Room for further treatment 12/29/21  Yes Linnea Verduzco APRN   Linzess 290 MCG capsule capsule Take 1 capsule by mouth Every Morning Before Breakfast. 7/16/21  Yes Linnea Verduzco APRN   multivitamin with minerals tablet tablet Take 1 tablet by mouth Daily. 9/16/21  Yes Earl Smith MD   montelukast (Singulair) 10 MG tablet Take 1 tablet by mouth Every Night. 1/28/21 8/5/21  Linnea Verduzco APRN     Allergies   Allergen Reactions   • Banana Angioedema and Hives     Itchy throat   • Latex Hives and Angioedema     Social History     Socioeconomic History   • Marital status:    Tobacco Use   • Smoking status: Never Smoker   • Smokeless tobacco: Never Used   Vaping Use   • Vaping Use: Never used   Substance and Sexual Activity   • Alcohol use: No   • Drug use: No   • Sexual activity: Yes     Partners: Male     Birth control/protection: None     Comment: last pap smear 4/16/19 negative        Review of Systems  Review of Systems   Constitutional: Negative for activity change, appetite change, chills, diaphoresis, fatigue, fever, unexpected weight change and weight loss.   HENT: Negative for sore throat and trouble swallowing.    Respiratory: Negative for shortness of breath.    Gastrointestinal: Positive for constipation and hemorrhoids. Negative for abdominal distention, abdominal pain, anal bleeding, anorexia, bloating, blood in stool, diarrhea, flatus,  "hematochezia, melena, nausea, rectal pain and vomiting.   Genitourinary: Negative for difficulty urinating.   Musculoskeletal: Negative for arthralgias and back pain.   Skin: Negative for pallor.   Neurological: Negative for light-headedness.        /68 (BP Location: Left arm)   Pulse 72   Ht 175.3 cm (69\")   Wt 73.7 kg (162 lb 6.4 oz)   BMI 23.98 kg/m²     Objective    Physical Exam  Constitutional:       General: She is not in acute distress.     Appearance: Normal appearance. She is normal weight. She is not ill-appearing.   HENT:      Head: Normocephalic and atraumatic.   Pulmonary:      Effort: Pulmonary effort is normal.   Abdominal:      General: Abdomen is flat. Bowel sounds are normal. There is no distension.      Palpations: Abdomen is soft. There is no mass.      Tenderness: There is no abdominal tenderness.   Neurological:      Mental Status: She is alert.       Admission on 03/07/2022, Discharged on 03/07/2022   Component Date Value Ref Range Status   • Case Report 03/07/2022    Final                    Value:Surgical Pathology Report                         Case: [id number]                                  Authorizing Provider:  Sloan Sterling MD        Collected:           03/07/2022 02:20 PM          Ordering Location:     [facility]   Received:            03/08/2022 07:54 AM                                 [place] [facility]                                                     Pathologist:           Vinicio Linares MD                                                          Specimens:   1) - Large Intestine                                                                                2) - Small Intestine, Ileum                                                               • Final Diagnosis 03/07/2022    Final                    Value:This result contains rich text formatting which cannot be displayed here.     Assessment/Plan      1. Chronic idiopathic constipation  "   .       Orders placed during this encounter include:  No orders of the defined types were placed in this encounter.      * Surgery not found *    Review and/or summary of lab tests, radiology, procedures, medications. Review and summary of old records and obtaining of history. The risks and benefits of my recommendations, as well as other treatment options were discussed with the patient today. Questions were answered.    New Medications Ordered This Visit   Medications   • Plecanatide (Trulance) 3 MG tablet     Sig: Take 1 tablet by mouth Daily.     Dispense:  30 tablet     Refill:  5       Follow-up: Return in about 4 weeks (around 4/12/2022) for Recheck.          This document has been electronically signed by JOSE CARLOS Fuentes on March 15, 2022 11:03 CDT           I spent 20 minutes caring for Nirmala on this date of service. This time includes time spent by me in the following activities:preparing for the visit, reviewing tests, obtaining and/or reviewing a separately obtained history, performing a medically appropriate examination and/or evaluation , counseling and educating the patient/family/caregiver, ordering medications, tests, or procedures, referring and communicating with other health care professionals , documenting information in the medical record and care coordination    Results for orders placed or performed during the hospital encounter of 03/07/22   Tissue Pathology Exam    Specimen: D: Large Intestine; Tissue    E: Small Intestine, Ileum; Tissue   Result Value Ref Range    Case Report       Surgical Pathology Report                         Case: [id number]                                  Authorizing Provider:  Sloan Sterling MD        Collected:           03/07/2022 02:20 PM          Ordering Location:     [facility]   Received:            03/08/2022 07:54 AM                                 [place] [facility]                                                      Pathologist:           Vinicio Linraes MD                                                          Specimens:   1) - Large Intestine                                                                                2) - Small Intestine, Ileum                                                                Final Diagnosis       SEE SCANNED REPORT       Results for orders placed or performed during the hospital encounter of 02/17/22   POCT Urine Micro    Specimen: Urine   Result Value Ref Range    WBC, UA 13-20 (A) None Seen /HPF    RBC, UA 0-2 (A) None Seen /HPF    Bacteria, UA 1+ (A) None Seen /HPF   POC Urinalysis Dipstick, Multipro (Automated dipstick)    Specimen: Urine   Result Value Ref Range    Color Straw Yellow, Straw, Dark Yellow, Roopa    Clarity, UA Clear Clear    Glucose, UA Negative Negative, 1000 mg/dL (3+) mg/dL    Bilirubin Negative Negative    Ketones, UA Negative Negative    Specific Gravity  1.005 1.005 - 1.030    Blood, UA Negative Negative    pH, Urine 6.5 5.0 - 8.0    Protein, POC Negative Negative mg/dL    Urobilinogen, UA Normal Normal    Nitrite, UA Negative Negative    Leukocytes Small (1+) (A) Negative   Results for orders placed or performed during the hospital encounter of 01/13/22   COVID-19, [facility] IN-HOUSE, NP SWAB IN TRANSPORT MEDIA 8-10 HR TAT - Swab, Anterior nasal    Specimen: Anterior nasal; Swab   Result Value Ref Range    COVID19 Not Detected Not Detected - Ref. Range   Results for orders placed or performed in visit on 12/20/21   COVID-19, [facility] IN-HOUSE, NP SWAB IN TRANSPORT MEDIA 8-10 HR TAT - Swab, Nasopharynx    Specimen: Nasopharynx; Swab   Result Value Ref Range    COVID19 Not Detected Not Detected - Ref. Range   Results for orders placed or performed during the hospital encounter of 12/17/21   POCT SARS-CoV-2 Antigen JEANNE    Specimen: Swab   Result Value Ref Range    SARS Antigen Not Detected Not Detected    Internal Control Passed Passed    Lot Number [id number]      Expiration Date 8/20/23    POCT Influenza A/B    Specimen: Swab   Result Value Ref Range    Rapid Influenza A Ag Negative Negative    Rapid Influenza B Ag Negative Negative    Internal Control Passed Passed    Lot Number [id number]     Expiration Date 5/7/22    Results for orders placed or performed during the hospital encounter of 10/11/21   POCT Rapid Strep A    Specimen: Swab   Result Value Ref Range    Rapid Strep A Screen Negative Negative, VALID, INVALID, Not Performed    Internal Control Passed Passed    Lot Number [id number]     Expiration Date 03/31/2022    Results for orders placed or performed during the hospital encounter of 09/16/21   CBC Auto Differential    Specimen: Blood   Result Value Ref Range    WBC 5.50 3.40 - 10.80 10*3/mm3    RBC 4.82 3.77 - 5.28 10*6/mm3    Hemoglobin 13.0 12.0 - 15.9 g/dL    Hematocrit 40.2 34.0 - 46.6 %    MCV 83.4 79.0 - 97.0 fL    MCH 27.0 26.6 - 33.0 pg    MCHC 32.3 31.5 - 35.7 g/dL    RDW 14.6 12.3 - 15.4 %    RDW-SD 44.3 37.0 - 54.0 fl    MPV 10.3 6.0 - 12.0 fL    Platelets 157 140 - 450 10*3/mm3    Neutrophil % 67.1 42.7 - 76.0 %    Lymphocyte % 19.8 19.6 - 45.3 %    Monocyte % 12.0 5.0 - 12.0 %    Eosinophil % 0.4 0.3 - 6.2 %    Basophil % 0.5 0.0 - 1.5 %    Immature Grans % 0.2 0.0 - 0.5 %    Neutrophils, Absolute 3.69 1.70 - 7.00 10*3/mm3    Lymphocytes, Absolute 1.09 0.70 - 3.10 10*3/mm3    Monocytes, Absolute 0.66 0.10 - 0.90 10*3/mm3    Eosinophils, Absolute 0.02 0.00 - 0.40 10*3/mm3    Basophils, Absolute 0.03 0.00 - 0.20 10*3/mm3    Immature Grans, Absolute 0.01 0.00 - 0.05 10*3/mm3    nRBC 0.0 0.0 - 0.2 /100 WBC   D-dimer, Quantitative    Specimen: Blood   Result Value Ref Range    D-Dimer, Quantitative <270 0 - 470 ng/mL (FEU)   hCG, Serum, Qualitative    Specimen: Blood   Result Value Ref Range    HCG Qualitative Negative Negative   Basic Metabolic Panel    Specimen: Blood   Result Value Ref Range    Glucose 100 (H) 65 - 99 mg/dL    BUN 10 6 - 20 mg/dL     Creatinine 0.61 0.57 - 1.00 mg/dL    Sodium 141 136 - 145 mmol/L    Potassium 3.4 (L) 3.5 - 5.2 mmol/L    Chloride 103 98 - 107 mmol/L    CO2 25.0 22.0 - 29.0 mmol/L    Calcium 9.4 8.6 - 10.5 mg/dL    eGFR Non African Amer 119 >60 mL/min/1.73    BUN/Creatinine Ratio 16.4 7.0 - 25.0    Anion Gap 13.0 5.0 - 15.0 mmol/L   Results for orders placed or performed during the hospital encounter of 09/14/21   COVID-19,[facility](JENNIFER),[facility], NP/OP SWAB IN UTM/VTM/SALINE TRANSPORT MEDIA,24 HR TAT - Swab, Nasal Cavity    Specimen: Nasal Cavity; Swab   Result Value Ref Range    COVID19 Detected (C) Not Detected - Ref. Range   Results for orders placed or performed in visit on 08/18/21   Holter Monitor - 72 Hour Up To 15 Days   Result Value Ref Range    Target HR (85%) 165 bpm    Max. Pred. HR (100%) 194 bpm   Results for orders placed or performed during the hospital encounter of 08/17/21   POCT SARS-CoV-2 Antigen JEANNE    Specimen: Swab   Result Value Ref Range    SARS Antigen Not Detected Not Detected    Internal Control Passed Passed    Lot Number [id number]     Expiration Date 11/03/21    Results for orders placed or performed in visit on 08/13/21   CBC Auto Differential    Specimen: Blood   Result Value Ref Range    WBC 5.20 3.40 - 10.80 10*3/mm3    RBC 4.91 3.77 - 5.28 10*6/mm3    Hemoglobin 13.2 12.0 - 15.9 g/dL    Hematocrit 41.2 34.0 - 46.6 %    MCV 83.9 79.0 - 97.0 fL    MCH 26.9 26.6 - 33.0 pg    MCHC 32.0 31.5 - 35.7 g/dL    RDW 14.0 12.3 - 15.4 %    RDW-SD 42.5 37.0 - 54.0 fl    MPV 10.5 6.0 - 12.0 fL    Platelets 211 140 - 450 10*3/mm3    Neutrophil % 69.7 42.7 - 76.0 %    Lymphocyte % 20.6 19.6 - 45.3 %    Monocyte % 8.3 5.0 - 12.0 %    Eosinophil % 0.6 0.3 - 6.2 %    Basophil % 0.6 0.0 - 1.5 %    Immature Grans % 0.2 0.0 - 0.5 %    Neutrophils, Absolute 3.63 1.70 - 7.00 10*3/mm3    Lymphocytes, Absolute 1.07 0.70 - 3.10 10*3/mm3    Monocytes, Absolute 0.43 0.10 - 0.90 10*3/mm3    Eosinophils, Absolute 0.03 0.00 -  0.40 10*3/mm3    Basophils, Absolute 0.03 0.00 - 0.20 10*3/mm3    Immature Grans, Absolute 0.01 0.00 - 0.05 10*3/mm3    nRBC 0.0 0.0 - 0.2 /100 WBC     *Note: Due to a large number of results and/or encounters for the requested time period, some results have not been displayed. A complete set of results can be found in Results Review.

## 2022-03-24 ENCOUNTER — OFFICE VISIT (OUTPATIENT)
Dept: FAMILY MEDICINE CLINIC | Facility: CLINIC | Age: 27
End: 2022-03-24

## 2022-03-24 VITALS
DIASTOLIC BLOOD PRESSURE: 80 MMHG | HEIGHT: 69 IN | OXYGEN SATURATION: 99 % | HEART RATE: 72 BPM | BODY MASS INDEX: 23.11 KG/M2 | SYSTOLIC BLOOD PRESSURE: 120 MMHG | WEIGHT: 156 LBS

## 2022-03-24 DIAGNOSIS — M54.9 MID BACK PAIN: ICD-10-CM

## 2022-03-24 DIAGNOSIS — R10.13 EPIGASTRIC PAIN: Primary | ICD-10-CM

## 2022-03-24 DIAGNOSIS — N39.0 URINARY TRACT INFECTION WITHOUT HEMATURIA, SITE UNSPECIFIED: Primary | ICD-10-CM

## 2022-03-24 DIAGNOSIS — R41.840 CONCENTRATION DEFICIT: ICD-10-CM

## 2022-03-24 LAB
BILIRUB BLD-MCNC: NEGATIVE MG/DL
CLARITY, POC: CLEAR
COLOR UR: YELLOW
GLUCOSE UR STRIP-MCNC: NEGATIVE MG/DL
KETONES UR QL: NEGATIVE
LEUKOCYTE EST, POC: ABNORMAL
NITRITE UR-MCNC: NEGATIVE MG/ML
PH UR: 6 [PH] (ref 5–8)
PROT UR STRIP-MCNC: ABNORMAL MG/DL
RBC # UR STRIP: NEGATIVE /UL
SP GR UR: 1.02 (ref 1–1.03)
UROBILINOGEN UR QL: NORMAL

## 2022-03-24 PROCEDURE — 87086 URINE CULTURE/COLONY COUNT: CPT | Performed by: NURSE PRACTITIONER

## 2022-03-24 PROCEDURE — 81002 URINALYSIS NONAUTO W/O SCOPE: CPT | Performed by: NURSE PRACTITIONER

## 2022-03-24 PROCEDURE — 99214 OFFICE O/P EST MOD 30 MIN: CPT | Performed by: NURSE PRACTITIONER

## 2022-03-24 RX ORDER — SULFAMETHOXAZOLE AND TRIMETHOPRIM 800; 160 MG/1; MG/1
1 TABLET ORAL
COMMUNITY
Start: 2022-03-22 | End: 2022-04-02

## 2022-03-24 RX ORDER — BACLOFEN 10 MG/1
10 TABLET ORAL 2 TIMES DAILY PRN
Qty: 60 TABLET | Refills: 5 | Status: SHIPPED | OUTPATIENT
Start: 2022-03-24 | End: 2022-06-02

## 2022-03-24 NOTE — PROGRESS NOTES
Chief Complaint   Patient presents with   • Back Pain     Upper back pain   UTI , has 2 in the last month is on meds now for it      Subjective   Nirmala Tamayo is a 27 y.o. female.           Presents with mid back pain and pressure, burning and pressure with urination and increase in anxiety with concentration concern -requesting a referral for add testing     Back Pain  This is a recurrent problem. The current episode started more than 1 month ago. The problem occurs intermittently. The problem has been gradually worsening since onset. The pain is present in the thoracic spine. The pain is at a severity of 2/10. The pain is mild. The pain is the same all the time. Stiffness is present all day. Pertinent negatives include no abdominal pain, bladder incontinence, bowel incontinence, chest pain, dysuria, fever, headaches, leg pain, numbness, paresis, paresthesias, pelvic pain, perianal numbness, tingling, weakness or weight loss. She has tried analgesics and NSAIDs for the symptoms. The treatment provided mild relief.   Urinary Tract Infection   This is a new problem. The current episode started 1 to 4 weeks ago. The problem has been waxing and waning. The pain is at a severity of 2/10. The pain is mild. She is not sexually active. There is no history of pyelonephritis. Associated symptoms include hesitancy and urgency. Pertinent negatives include no hematuria. She has tried antibiotics for the symptoms. The treatment provided mild relief. Her past medical history is significant for recurrent UTIs. There is no history of catheterization, kidney stones, a single kidney, urinary stasis or a urological procedure.   Anxiety  Presents for follow-up visit. Symptoms include decreased concentration, excessive worry, malaise, nervous/anxious behavior and obsessions. Patient reports no chest pain, confusion, depressed mood, dizziness, dry mouth, hyperventilation, impotence, insomnia, palpitations, shortness of  "breath or suicidal ideas. Symptoms occur rarely. The quality of sleep is good.             The following portions of the patient's history were reviewed and updated as appropriate: allergies, current medications, past social history and problem list.    Review of Systems   Constitutional: Negative.  Negative for fever and weight loss.   HENT: Negative.    Eyes: Negative.  Negative for photophobia, redness and visual disturbance.   Respiratory: Negative.  Negative for apnea, chest tightness, shortness of breath, wheezing and stridor.    Cardiovascular: Negative for chest pain, palpitations and leg swelling.   Gastrointestinal: Negative for abdominal distention, abdominal pain, anal bleeding, blood in stool and bowel incontinence.   Endocrine: Negative.    Genitourinary: Positive for hesitancy and urgency. Negative for bladder incontinence, dysuria, hematuria, impotence and pelvic pain.   Musculoskeletal: Positive for arthralgias, back pain, gait problem, joint swelling and myalgias. Negative for neck pain and neck stiffness.   Allergic/Immunologic: Negative.    Neurological: Negative for dizziness, tingling, seizures, speech difficulty, weakness, light-headedness, numbness, headaches and paresthesias.   Hematological: Negative.  Negative for adenopathy. Does not bruise/bleed easily.   Psychiatric/Behavioral: Positive for decreased concentration. Negative for agitation, behavioral problems, confusion, dysphoric mood, hallucinations and suicidal ideas. The patient is nervous/anxious. The patient does not have insomnia and is not hyperactive.        Objective   /80   Pulse 72   Ht 175.3 cm (69\")   Wt 70.8 kg (156 lb)   SpO2 99%   BMI 23.04 kg/m²   Physical Exam  Vitals and nursing note reviewed.   Constitutional:       Appearance: Normal appearance.   HENT:      Head: Normocephalic.      Right Ear: Tympanic membrane normal.      Nose: Nose normal.      Mouth/Throat:      Mouth: Mucous membranes are moist. "   Eyes:      Extraocular Movements: Extraocular movements intact.      Pupils: Pupils are equal, round, and reactive to light.   Cardiovascular:      Rate and Rhythm: Normal rate.      Pulses: Normal pulses.      Heart sounds: Normal heart sounds.   Pulmonary:      Effort: Pulmonary effort is normal.      Breath sounds: Normal breath sounds.   Abdominal:      General: Abdomen is flat.      Palpations: Abdomen is soft.   Musculoskeletal:         General: Tenderness present. No swelling, deformity or signs of injury.      Cervical back: Normal range of motion. No swelling, edema, deformity, erythema, lacerations, spasms, tenderness or bony tenderness.      Thoracic back: Spasms and tenderness present.        Back:       Right lower leg: No edema.      Left lower leg: No edema.      Comments: Mid spasm pain    Skin:     General: Skin is warm.   Neurological:      General: No focal deficit present.      Mental Status: She is alert and oriented to person, place, and time.      Cranial Nerves: No cranial nerve deficit.      Sensory: No sensory deficit.      Motor: No weakness.      Coordination: Coordination normal.   Psychiatric:         Mood and Affect: Mood normal.         Behavior: Behavior normal.              Assessment/Plan     Problems Addressed this Visit    None     Visit Diagnoses     Urinary tract infection without hematuria, site unspecified    -  Primary    Relevant Orders    POCT urinalysis dipstick, manual (Completed)    Urine Culture - Urine, Urine, Clean Catch    Mid back pain        Relevant Orders    XR spine thoracic 3 vw (Completed)    Concentration deficit        Relevant Orders    Ambulatory Referral to Behavioral Health (Completed)      Diagnoses       Codes Comments    Urinary tract infection without hematuria, site unspecified    -  Primary ICD-10-CM: N39.0  ICD-9-CM: 599.0     Mid back pain     ICD-10-CM: M54.9  ICD-9-CM: 724.5     Concentration deficit     ICD-10-CM: R41.840  ICD-9-CM: 799.51             New Medications Ordered This Visit   Medications   • baclofen (LIORESAL) 10 MG tablet     Sig: Take 1 tablet by mouth 2 (Two) Times a Day As Needed for Muscle Spasms.     Dispense:  60 tablet     Refill:  5     Current Outpatient Medications on File Prior to Visit   Medication Sig Dispense Refill   • albuterol sulfate  (90 Base) MCG/ACT inhaler Inhale 2 puffs Every 4 (Four) Hours As Needed for Wheezing. 18 g 11   • beclomethasone (Qvar) 40 MCG/ACT inhaler Inhale 2 puffs 2 (Two) Times a Day. 8.6 g 11   • cetirizine (zyrTEC) 10 MG tablet Take 1 tablet by mouth Daily. 30 tablet 0   • clonazePAM (KlonoPIN) 0.5 MG tablet Take 1 tablet by mouth 2 (Two) Times a Day As Needed for Anxiety. 60 tablet 0   • EPINEPHrine (EpiPen 2-Augusto) 0.3 MG/0.3ML solution auto-injector injection Inject once for allergic reaction as instructed and proceed to the nearest Emergency Room for further treatment 2 each 1   • multivitamin with minerals tablet tablet Take 1 tablet by mouth Daily. 21 tablet 0   • Plecanatide (Trulance) 3 MG tablet Take 1 tablet by mouth Daily. 30 tablet 5   • sulfamethoxazole-trimethoprim (BACTRIM DS,SEPTRA DS) 800-160 MG per tablet Take 1 tablet by mouth.     • [DISCONTINUED] montelukast (Singulair) 10 MG tablet Take 1 tablet by mouth Every Night. 30 tablet 11     No current facility-administered medications on file prior to visit.    15   20  minutes   Follow Up   No follow-ups on file.         It's not just what you eat, but when you eat  Eat breakfast, and eat smaller meals throughout the day. A healthy breakfast can jumpstart your metabolism, while eating small, healthy meals (rather than the standard three large meals) keeps your energy up.   Avoid eating at night. Try to eat dinner earlier and fast for 14-16 hours until breakfast the next morning. Studies suggest that eating only when you’re most active and giving your digestive system a long break each day may help to regulate weight.      Narrative & Impression  EXAMINATION:  XR SPINE THORACIC 3 VW     CLINICAL HISTORY:  27 years Female,mid back pain, M54.9  Dorsalgia, unspecified     COMPARISON:  None     FINDINGS:  There is very mild apex right thoracic curvature  centered about the T6 level. Normal vertebral body height is  preserved. No step-off in alignment on the lateral view. No  appreciable degenerative change.     IMPRESSION:  Very mild apex right thoracic curvature.     Electronically signed by:  Jaime Calrk MD  3/24/2022 12:38 PM  CDT Workstation:    Refer to mental health as directed for concentration deficit   Currently taking bactrim for uti, continue taking, culture sent, baclofen for muscle spasm, follow up if worsen patient agrees  Call back if symptoms worsen

## 2022-03-25 ENCOUNTER — TELEPHONE (OUTPATIENT)
Dept: FAMILY MEDICINE CLINIC | Facility: CLINIC | Age: 27
End: 2022-03-25

## 2022-03-25 LAB — BACTERIA SPEC AEROBE CULT: NO GROWTH

## 2022-03-25 NOTE — PROGRESS NOTES
Per JOSE CARLOS Yarbrough, Ms. Tamayo has been called with recent lab results & recommendations.  Continue current medications and follow-up as planned or sooner if any problems.

## 2022-03-25 NOTE — TELEPHONE ENCOUNTER
Per JOSE CARLOS Yarbrough, Ms. Tamayo has been called with recent lab results & recommendations.  Continue current medications and follow-up as planned or sooner if any problems.       ----- Message from JOSE CARLOS Skaggs sent at 3/25/2022 10:12 AM CDT -----  Can you let her now no growth, continue current meds

## 2022-04-18 RX ORDER — MEBENDAZOLE 100 MG/1
100 TABLET, CHEWABLE ORAL 2 TIMES DAILY
Qty: 20 TABLET | Refills: 5 | Status: SHIPPED | OUTPATIENT
Start: 2022-04-18 | End: 2022-04-20 | Stop reason: SDUPTHER

## 2022-04-19 ENCOUNTER — APPOINTMENT (OUTPATIENT)
Dept: ULTRASOUND IMAGING | Facility: HOSPITAL | Age: 27
End: 2022-04-19

## 2022-04-20 RX ORDER — MEBENDAZOLE 100 MG/1
100 TABLET, CHEWABLE ORAL 2 TIMES DAILY
Qty: 6 TABLET | Refills: 0 | Status: SHIPPED | OUTPATIENT
Start: 2022-04-20 | End: 2022-04-21 | Stop reason: SDUPTHER

## 2022-04-21 RX ORDER — MEBENDAZOLE 100 MG/1
100 TABLET, CHEWABLE ORAL 2 TIMES DAILY
Qty: 6 TABLET | Refills: 0 | Status: SHIPPED | OUTPATIENT
Start: 2022-04-21 | End: 2022-04-24

## 2022-06-02 ENCOUNTER — TELEPHONE (OUTPATIENT)
Dept: FAMILY MEDICINE CLINIC | Facility: CLINIC | Age: 27
End: 2022-06-02

## 2022-06-02 ENCOUNTER — LAB (OUTPATIENT)
Dept: LAB | Facility: HOSPITAL | Age: 27
End: 2022-06-02

## 2022-06-02 ENCOUNTER — OFFICE VISIT (OUTPATIENT)
Dept: FAMILY MEDICINE CLINIC | Facility: CLINIC | Age: 27
End: 2022-06-02

## 2022-06-02 VITALS
SYSTOLIC BLOOD PRESSURE: 118 MMHG | HEIGHT: 69 IN | DIASTOLIC BLOOD PRESSURE: 74 MMHG | OXYGEN SATURATION: 99 % | WEIGHT: 162.2 LBS | RESPIRATION RATE: 22 BRPM | HEART RATE: 82 BPM | BODY MASS INDEX: 24.02 KG/M2

## 2022-06-02 DIAGNOSIS — N76.0 BACTERIAL VAGINOSIS: ICD-10-CM

## 2022-06-02 DIAGNOSIS — R10.30 LOWER ABDOMINAL PAIN: Primary | ICD-10-CM

## 2022-06-02 DIAGNOSIS — B96.89 BACTERIAL VAGINOSIS: ICD-10-CM

## 2022-06-02 DIAGNOSIS — R10.30 LOWER ABDOMINAL PAIN: ICD-10-CM

## 2022-06-02 DIAGNOSIS — R11.0 NAUSEA: ICD-10-CM

## 2022-06-02 DIAGNOSIS — B37.9 YEAST INFECTION: ICD-10-CM

## 2022-06-02 DIAGNOSIS — N94.10 DYSPAREUNIA, FEMALE: ICD-10-CM

## 2022-06-02 LAB
ALBUMIN SERPL-MCNC: 4.5 G/DL (ref 3.5–5.2)
ALBUMIN/GLOB SERPL: 1.7 G/DL
ALP SERPL-CCNC: 58 U/L (ref 39–117)
ALT SERPL W P-5'-P-CCNC: 13 U/L (ref 1–33)
ANION GAP SERPL CALCULATED.3IONS-SCNC: 8.9 MMOL/L (ref 5–15)
AST SERPL-CCNC: 14 U/L (ref 1–32)
BASOPHILS # BLD AUTO: 0.03 10*3/MM3 (ref 0–0.2)
BASOPHILS NFR BLD AUTO: 0.4 % (ref 0–1.5)
BILIRUB BLD-MCNC: ABNORMAL MG/DL
BILIRUB SERPL-MCNC: 0.2 MG/DL (ref 0–1.2)
BUN SERPL-MCNC: 10 MG/DL (ref 6–20)
BUN/CREAT SERPL: 16.7 (ref 7–25)
CALCIUM SPEC-SCNC: 9.4 MG/DL (ref 8.6–10.5)
CANDIDA ALBICANS: NEGATIVE
CHLORIDE SERPL-SCNC: 105 MMOL/L (ref 98–107)
CLARITY, POC: CLEAR
CO2 SERPL-SCNC: 25.1 MMOL/L (ref 22–29)
COLOR UR: ABNORMAL
CREAT SERPL-MCNC: 0.6 MG/DL (ref 0.57–1)
DEPRECATED RDW RBC AUTO: 43.1 FL (ref 37–54)
EGFRCR SERPLBLD CKD-EPI 2021: 126.3 ML/MIN/1.73
EOSINOPHIL # BLD AUTO: 0.03 10*3/MM3 (ref 0–0.4)
EOSINOPHIL NFR BLD AUTO: 0.4 % (ref 0.3–6.2)
ERYTHROCYTE [DISTWIDTH] IN BLOOD BY AUTOMATED COUNT: 14.5 % (ref 12.3–15.4)
GARDNERELLA VAGINALIS: NEGATIVE
GLOBULIN UR ELPH-MCNC: 2.7 GM/DL
GLUCOSE SERPL-MCNC: 93 MG/DL (ref 65–99)
GLUCOSE UR STRIP-MCNC: NEGATIVE MG/DL
HCG SERPL QL: NEGATIVE
HCT VFR BLD AUTO: 37.2 % (ref 34–46.6)
HGB BLD-MCNC: 11.9 G/DL (ref 12–15.9)
IMM GRANULOCYTES # BLD AUTO: 0.01 10*3/MM3 (ref 0–0.05)
IMM GRANULOCYTES NFR BLD AUTO: 0.1 % (ref 0–0.5)
KETONES UR QL: ABNORMAL
LEUKOCYTE EST, POC: ABNORMAL
LYMPHOCYTES # BLD AUTO: 1.01 10*3/MM3 (ref 0.7–3.1)
LYMPHOCYTES NFR BLD AUTO: 14.5 % (ref 19.6–45.3)
MCH RBC QN AUTO: 26.3 PG (ref 26.6–33)
MCHC RBC AUTO-ENTMCNC: 32 G/DL (ref 31.5–35.7)
MCV RBC AUTO: 82.1 FL (ref 79–97)
MONOCYTES # BLD AUTO: 0.49 10*3/MM3 (ref 0.1–0.9)
MONOCYTES NFR BLD AUTO: 7 % (ref 5–12)
NEUTROPHILS NFR BLD AUTO: 5.4 10*3/MM3 (ref 1.7–7)
NEUTROPHILS NFR BLD AUTO: 77.6 % (ref 42.7–76)
NITRITE UR-MCNC: NEGATIVE MG/ML
NRBC BLD AUTO-RTO: 0 /100 WBC (ref 0–0.2)
PH UR: 6 [PH] (ref 5–8)
PLATELET # BLD AUTO: 221 10*3/MM3 (ref 140–450)
PMV BLD AUTO: 10.4 FL (ref 6–12)
POTASSIUM SERPL-SCNC: 4.6 MMOL/L (ref 3.5–5.2)
PROT SERPL-MCNC: 7.2 G/DL (ref 6–8.5)
PROT UR STRIP-MCNC: ABNORMAL MG/DL
RBC # BLD AUTO: 4.53 10*6/MM3 (ref 3.77–5.28)
RBC # UR STRIP: NEGATIVE /UL
SODIUM SERPL-SCNC: 139 MMOL/L (ref 136–145)
SP GR UR: 1.03 (ref 1–1.03)
T VAGINALIS DNA VAG QL PROBE+SIG AMP: NEGATIVE
TSH SERPL DL<=0.05 MIU/L-ACNC: 1.09 UIU/ML (ref 0.27–4.2)
UROBILINOGEN UR QL: NORMAL
WBC NRBC COR # BLD: 6.97 10*3/MM3 (ref 3.4–10.8)

## 2022-06-02 PROCEDURE — 99214 OFFICE O/P EST MOD 30 MIN: CPT | Performed by: NURSE PRACTITIONER

## 2022-06-02 PROCEDURE — 36415 COLL VENOUS BLD VENIPUNCTURE: CPT | Performed by: NURSE PRACTITIONER

## 2022-06-02 PROCEDURE — 87510 GARDNER VAG DNA DIR PROBE: CPT | Performed by: NURSE PRACTITIONER

## 2022-06-02 PROCEDURE — 87480 CANDIDA DNA DIR PROBE: CPT | Performed by: NURSE PRACTITIONER

## 2022-06-02 PROCEDURE — 87660 TRICHOMONAS VAGIN DIR PROBE: CPT | Performed by: NURSE PRACTITIONER

## 2022-06-02 PROCEDURE — 81001 URINALYSIS AUTO W/SCOPE: CPT | Performed by: NURSE PRACTITIONER

## 2022-06-02 PROCEDURE — 84703 CHORIONIC GONADOTROPIN ASSAY: CPT | Performed by: NURSE PRACTITIONER

## 2022-06-02 PROCEDURE — 81002 URINALYSIS NONAUTO W/O SCOPE: CPT | Performed by: NURSE PRACTITIONER

## 2022-06-02 PROCEDURE — 80050 GENERAL HEALTH PANEL: CPT

## 2022-06-02 RX ORDER — FLUCONAZOLE 150 MG/1
150 TABLET ORAL ONCE
Qty: 1 TABLET | Refills: 0 | Status: SHIPPED | OUTPATIENT
Start: 2022-06-02 | End: 2022-06-02

## 2022-06-02 RX ORDER — METRONIDAZOLE 500 MG/1
500 TABLET ORAL 2 TIMES DAILY
Qty: 14 TABLET | Refills: 0 | Status: SHIPPED | OUTPATIENT
Start: 2022-06-02 | End: 2022-06-06

## 2022-06-02 RX ORDER — ONDANSETRON HYDROCHLORIDE 8 MG/1
8 TABLET, FILM COATED ORAL EVERY 8 HOURS PRN
Qty: 20 TABLET | Refills: 6 | Status: SHIPPED | OUTPATIENT
Start: 2022-06-02 | End: 2023-03-08

## 2022-06-02 NOTE — PROGRESS NOTES
Chief Complaint  Abdominal Pain    Subjective          Nirmala Tamayo presents to [facility] [place]  With lower abdominal pain that radiates to her back, she has been having yellow/ green vaginal discharge for about a week as well as slight itching. The pain seems to occur during sex. She has had no new sexual partners.             Abdominal Pain  This is a new problem. The current episode started in the past 7 days. The onset quality is sudden. The problem occurs intermittently. The problem has been waxing and waning. The pain is located in the RLQ and LLQ. The pain is moderate. The quality of the pain is cramping. The abdominal pain radiates to the back. Associated symptoms include nausea. Exacerbated by: intercourse  The pain is relieved by nothing. She has tried nothing for the symptoms. The treatment provided no relief.   Vaginal Discharge  The patient's primary symptoms include pelvic pain and vaginal discharge. The patient's pertinent negatives include no genital itching. This is a new problem. The current episode started in the past 7 days. The problem occurs daily. The problem has been waxing and waning. The pain is mild. The problem affects both sides. Associated symptoms include abdominal pain, back pain, nausea and painful intercourse. The vaginal discharge was green, yellow and malodorous. There has been no bleeding. The symptoms are aggravated by intercourse. She has tried nothing for the symptoms. She is sexually active. No, her partner does not have an STD. She uses nothing for contraception. Her menstrual history has been regular. Her past medical history is significant for ovarian cysts.     Outpatient Medications Prior to Visit   Medication Sig Dispense Refill   • albuterol sulfate  (90 Base) MCG/ACT inhaler Inhale 2 puffs Every 4 (Four) Hours As Needed for Wheezing. 18 g 11   • beclomethasone (Qvar) 40 MCG/ACT inhaler Inhale 2 puffs  "2 (Two) Times a Day. 8.6 g 11   • cetirizine (zyrTEC) 10 MG tablet Take 1 tablet by mouth Daily. 30 tablet 0   • clonazePAM (KlonoPIN) 0.5 MG tablet Take 1 tablet by mouth 2 (Two) Times a Day As Needed for Anxiety. 60 tablet 0   • EPINEPHrine (EpiPen 2-Augusto) 0.3 MG/0.3ML solution auto-injector injection Inject once for allergic reaction as instructed and proceed to the nearest Emergency Room for further treatment 2 each 1   • linaclotide (LINZESS) 290 MCG capsule capsule Take 290 mcg by mouth Every Morning Before Breakfast.     • baclofen (LIORESAL) 10 MG tablet Take 1 tablet by mouth 2 (Two) Times a Day As Needed for Muscle Spasms. 60 tablet 5     No facility-administered medications prior to visit.       Review of Systems   Gastrointestinal: Positive for abdominal pain and nausea.   Genitourinary: Positive for pelvic pain and vaginal discharge.   Musculoskeletal: Positive for back pain.         Objective   Vital Signs:   Visit Vitals  /74 (BP Location: Left arm, Patient Position: Sitting, Cuff Size: Adult)   Pulse 82   Resp 22   Ht 175.3 cm (69\")   Wt 73.6 kg (162 lb 3.2 oz)   SpO2 99%   BMI 23.95 kg/m²     Physical Exam  Vitals and nursing note reviewed.   Constitutional:       Appearance: She is well-developed.   HENT:      Head: Normocephalic and atraumatic.   Eyes:      General: Lids are normal.      Conjunctiva/sclera: Conjunctivae normal.   Neck:      Thyroid: No thyroid mass or thyromegaly.      Trachea: Trachea normal. No tracheal tenderness.   Cardiovascular:      Rate and Rhythm: Normal rate.      Pulses: Normal pulses.      Heart sounds: Normal heart sounds.   Pulmonary:      Effort: Pulmonary effort is normal. No respiratory distress.      Breath sounds: Normal breath sounds. No wheezing.   Abdominal:      General: Bowel sounds are normal. There is no distension.      Palpations: Abdomen is soft. There is no mass.      Tenderness: There is no abdominal tenderness.   Musculoskeletal:         " General: Normal range of motion.      Cervical back: Normal range of motion. No edema.   Lymphadenopathy:      Head:      Right side of head: No submental, submandibular or tonsillar adenopathy.      Left side of head: No submental, submandibular or tonsillar adenopathy.   Skin:     General: Skin is warm and dry.      Coloration: Skin is not pale.      Findings: No abrasion, erythema or lesion.   Neurological:      Mental Status: She is alert and oriented to person, place, and time.   Psychiatric:         Mood and Affect: Mood is not anxious. Affect is not inappropriate.         Speech: Speech normal.         Behavior: Behavior normal.         Thought Content: Thought content normal.         Judgment: Judgment normal. Judgment is not impulsive.        Result Review :                 Assessment and Plan    Diagnoses and all orders for this visit:    1. Lower abdominal pain (Primary)  -     POCT urinalysis dipstick, manual  -     TSH; Future  -     Comprehensive Metabolic Panel; Future  -     CBC & Differential; Future  -     hCG, Quantitative, Pregnancy  -     Gardnerella vaginalis, Trichomonas vaginalis, Candida albicans, DNA - Swab, Vagina; Future  -     Gardnerella vaginalis, Trichomonas vaginalis, Candida albicans, DNA - Swab, Vagina  -     Urinalysis With Culture If Indicated -; Future  -     Urinalysis With Culture If Indicated - Urine, Clean Catch    2. Nausea  -     ondansetron (Zofran) 8 MG tablet; Take 1 tablet by mouth Every 8 (Eight) Hours As Needed for Nausea or Vomiting.  Dispense: 20 tablet; Refill: 6    3. Bacterial vaginosis  -     metroNIDAZOLE (Flagyl) 500 MG tablet; Take 1 tablet by mouth 2 (Two) Times a Day.  Dispense: 14 tablet; Refill: 0    4. Yeast infection  -     metroNIDAZOLE (Flagyl) 500 MG tablet; Take 1 tablet by mouth 2 (Two) Times a Day.  Dispense: 14 tablet; Refill: 0    5. Dyspareunia, female  -     metroNIDAZOLE (Flagyl) 500 MG tablet; Take 1 tablet by mouth 2 (Two) Times a Day.   Dispense: 14 tablet; Refill: 0  -     fluconazole (Diflucan) 150 MG tablet; Take 1 tablet by mouth 1 (One) Time for 1 dose.  Dispense: 1 tablet; Refill: 0      Complete ordered lab work   We will call with results    Please call if you have any issues or worsening symptoms       Follow Up   Return if symptoms worsen or fail to improve.  Patient was given instructions and counseling regarding her condition or for health maintenance advice. Please see specific information pulled into the AVS if appropriate.           This document has been electronically signed by JOSE CARLOS Alex on June 2, 2022 09:57 CDT

## 2022-06-03 ENCOUNTER — TELEPHONE (OUTPATIENT)
Dept: FAMILY MEDICINE CLINIC | Facility: CLINIC | Age: 27
End: 2022-06-03

## 2022-06-03 DIAGNOSIS — N39.0 URINARY TRACT INFECTION WITHOUT HEMATURIA, SITE UNSPECIFIED: Primary | ICD-10-CM

## 2022-06-03 LAB
AMORPH URATE CRY URNS QL MICRO: ABNORMAL /HPF
BACTERIA UR QL AUTO: ABNORMAL /HPF
BILIRUB UR QL STRIP: NEGATIVE
CLARITY UR: ABNORMAL
COLOR UR: YELLOW
GLUCOSE UR STRIP-MCNC: NEGATIVE MG/DL
HGB UR QL STRIP.AUTO: NEGATIVE
HYALINE CASTS UR QL AUTO: ABNORMAL /LPF
KETONES UR QL STRIP: ABNORMAL
LEUKOCYTE ESTERASE UR QL STRIP.AUTO: ABNORMAL
NITRITE UR QL STRIP: NEGATIVE
PH UR STRIP.AUTO: 6 [PH] (ref 5–8)
PROT UR QL STRIP: ABNORMAL
RBC # UR STRIP: ABNORMAL /HPF
REF LAB TEST METHOD: ABNORMAL
SP GR UR STRIP: 1.03 (ref 1–1.03)
SQUAMOUS #/AREA URNS HPF: ABNORMAL /HPF
UROBILINOGEN UR QL STRIP: ABNORMAL
WBC # UR STRIP: ABNORMAL /HPF

## 2022-06-03 RX ORDER — NITROFURANTOIN 25; 75 MG/1; MG/1
100 CAPSULE ORAL 2 TIMES DAILY
Qty: 10 CAPSULE | Refills: 0 | OUTPATIENT
Start: 2022-06-03 | End: 2022-06-21

## 2022-06-03 NOTE — TELEPHONE ENCOUNTER
Patient was seen by Antoinette Kraft yesterday and was calling to see if her lab results were back.  Please call [phone]

## 2022-06-06 ENCOUNTER — OFFICE VISIT (OUTPATIENT)
Dept: OBSTETRICS AND GYNECOLOGY | Facility: CLINIC | Age: 27
End: 2022-06-06

## 2022-06-06 ENCOUNTER — LAB (OUTPATIENT)
Dept: LAB | Facility: HOSPITAL | Age: 27
End: 2022-06-06

## 2022-06-06 VITALS
BODY MASS INDEX: 24.05 KG/M2 | WEIGHT: 162.4 LBS | DIASTOLIC BLOOD PRESSURE: 78 MMHG | HEIGHT: 69 IN | SYSTOLIC BLOOD PRESSURE: 106 MMHG

## 2022-06-06 DIAGNOSIS — R10.2 FEMALE PELVIC PAIN: Primary | ICD-10-CM

## 2022-06-06 DIAGNOSIS — N94.10 FEMALE DYSPAREUNIA: ICD-10-CM

## 2022-06-06 PROCEDURE — 87491 CHLMYD TRACH DNA AMP PROBE: CPT | Performed by: OBSTETRICS & GYNECOLOGY

## 2022-06-06 PROCEDURE — 87591 N.GONORRHOEAE DNA AMP PROB: CPT | Performed by: OBSTETRICS & GYNECOLOGY

## 2022-06-06 PROCEDURE — 99213 OFFICE O/P EST LOW 20 MIN: CPT | Performed by: OBSTETRICS & GYNECOLOGY

## 2022-06-06 PROCEDURE — 87661 TRICHOMONAS VAGINALIS AMPLIF: CPT | Performed by: OBSTETRICS & GYNECOLOGY

## 2022-06-06 NOTE — PROGRESS NOTES
Nirmala Tamayo is a 27 y.o. y/o female.     Chief Complaint: Pelvic pain, dyspareunia and    HPI:   27 y.o. .  Patient's last menstrual period was 2022 (exact date)..  Patient had dyspareunia seen by PCP.  This was associated with vaginal discharge vaginitis screen was negative.  Found to have UTI treated for this has had intercourse since this and did not have pain.          Review of Systems     Constitutional: Denies night sweats    HENT: No hearing changes, denies ear pain    Eye: No eye pain; no foreign body in eye    Pulmonary: No hemoptysis    Cardiovascular: No claudication    GI: No hematemesis    Musculoskeletal: No arthralgias, no joint swelling    Endocrine: No polydipsia or polyuria    Hematologic: Denies any free bleeding    Psychiatric: Denies any delusions    The following portions of the patient's history were reviewed and updated as appropriate: allergies, current medications, past family history, past medical history, past social history, past surgical history and problem list.    Allergies   Allergen Reactions   • Banana Angioedema and Hives     Itchy throat   • Latex Hives and Angioedema        Outpatient Medications Prior to Visit   Medication Sig Dispense Refill   • albuterol sulfate  (90 Base) MCG/ACT inhaler Inhale 2 puffs Every 4 (Four) Hours As Needed for Wheezing. 18 g 11   • beclomethasone (Qvar) 40 MCG/ACT inhaler Inhale 2 puffs 2 (Two) Times a Day. 8.6 g 11   • cetirizine (zyrTEC) 10 MG tablet Take 1 tablet by mouth Daily. 30 tablet 0   • clonazePAM (KlonoPIN) 0.5 MG tablet Take 1 tablet by mouth 2 (Two) Times a Day As Needed for Anxiety. 60 tablet 0   • EPINEPHrine (EpiPen 2-Augusto) 0.3 MG/0.3ML solution auto-injector injection Inject once for allergic reaction as instructed and proceed to the nearest Emergency Room for further treatment 2 each 1   • linaclotide (LINZESS) 290 MCG capsule capsule Take 290 mcg by mouth Every Morning Before Breakfast.     •  nitrofurantoin, macrocrystal-monohydrate, (Macrobid) 100 MG capsule Take 1 capsule by mouth 2 (Two) Times a Day. 10 capsule 0   • ondansetron (Zofran) 8 MG tablet Take 1 tablet by mouth Every 8 (Eight) Hours As Needed for Nausea or Vomiting. 20 tablet 6   • metroNIDAZOLE (Flagyl) 500 MG tablet Take 1 tablet by mouth 2 (Two) Times a Day. 14 tablet 0     No facility-administered medications prior to visit.        The patient has a family history of   Family History   Problem Relation Age of Onset   • Heart disease Mother    • Hypertension Mother    • Hyperthyroidism Mother    • Kidney failure Sister    • Heart failure Sister    • COPD Sister    • Cancer Other    • Diabetes Other    • Hypertension Other    • Stroke Other    • Thyroid disease Other    • Gallbladder disease Other         Gallstones   • Cervical cancer Maternal Grandmother    • Diabetes Maternal Grandmother    • No Known Problems Son    • Febrile seizures Daughter    • COPD Father    • Diabetes Paternal Grandmother    • Cirrhosis Paternal Grandmother    • Parkinsonism Maternal Grandfather    • Heart disease Maternal Grandfather    • Hypothyroidism Sister    • No Known Problems Son         Past Medical History:   Diagnosis Date   • Abnormal EKG 08/18/2021   • Abnormal Pap smear of cervix    • Acute allergic reaction    • Acute bronchitis    • Acute pharyngitis    • Agoraphobia with panic attacks    • Allergic rhinitis    • Anemia    • Anxiety    • Anxiety state    • Asthma     Stable   • Back strain    • Constipation    • Cough    • Disturbance of skin sensation    • Gestational hypertension     with second pregnancy   • Headache    • History of transfusion     after second delivery   • HPV (human papilloma virus) infection    • Hx of preeclampsia, prior pregnancy, currently pregnant 01/02/2018   • Irregular periods    • Irritable bowel syndrome with constipation    • Low back pain    • Lumbosacral dysfunction    • Neck pain    • Palpitations    •  "Preeclampsia    • Rh negative state in antepartum period, third trimester 2017   • Rhinitis    • Severe depression (HCC)    • Spasm     cervical spasm   • Spinal headache    • Upper respiratory infection    • Urinary tract infection 2017   • Vaginal irritation    • Vitreous floaters     prob, not seen on exam   • Wheezing         OB History        4    Para   4    Term   3       1    AB        Living   4       SAB        IAB        Ectopic        Molar        Multiple   0    Live Births   4                 Social History     Socioeconomic History   • Marital status:    Tobacco Use   • Smoking status: Never Smoker   • Smokeless tobacco: Never Used   Vaping Use   • Vaping Use: Never used   Substance and Sexual Activity   • Alcohol use: No   • Drug use: No   • Sexual activity: Yes     Partners: Male     Birth control/protection: None     Comment: last pap smear 19 negative         Past Surgical History:   Procedure Laterality Date   • COLONOSCOPY N/A 3/7/2022    Procedure: COLONOSCOPY;  Surgeon: Sloan Sterling MD;  Location: [facility] ENDOSCOPY;  Service: Gastroenterology;  Laterality: N/A;   • PROCEDURE GENERIC CONVERTED  2016    Physical Therapy Consult   • WISDOM TOOTH EXTRACTION          Patient Active Problem List   Diagnosis   • Sore throat   • Anxiety   • Low back pain with radiation   • Lumbar disc disorder   • Irritable bowel syndrome with constipation   • Epigastric pain   • Acute upper respiratory infection   • Left otitis media   • Cough   • Abnormal EKG   • Palpitations   • Lower abdominal pain   • Blood in stool        Documented Vitals    22 1055   BP: 106/78   Weight: 73.7 kg (162 lb 6.4 oz)   Height: 175.3 cm (69\")   PainSc: 0-No pain        Body mass index is 23.98 kg/m².    Physical Exam  Constitutional: Appears to be in no acute distress; Eyes: Sclerae normal; Endocrine system: Thyroid palpation is normal; Pulmonary system: Lungs clear; Cardiovascular " system: Heart regular rate and rhythm; Gastrointestinal system: Abdomen soft and nontender, active bowel sounds; Urologic system: CVA negative; Psychiatric: Appropriate insight; Neurologic: Gait within normal limits     Laboratory Data:   Lab Results - Last 18 Months   Lab Units 06/02/22 0946 09/16/21 2204 08/13/21 1135 04/02/21 0235 12/09/20  1425   GLUCOSE mg/dL 93 100* 76 147* 97   BUN mg/dL 10 10 11 11 13   CREATININE mg/dL 0.60 0.61 0.76 0.73 0.54*   SODIUM mmol/L 139 141 140 140 138   POTASSIUM mmol/L 4.6 3.4* 4.0 3.5 4.0   CHLORIDE mmol/L 105 103 103 106 103   CO2 mmol/L 25.1 25.0 26.0 25.0 23.7   CALCIUM mg/dL 9.4 9.4 9.7 9.4 9.4   TOTAL PROTEIN g/dL 7.2  --  7.7 7.2 7.6   ALBUMIN g/dL 4.50  --  4.50 4.40 4.50   ALT (SGPT) U/L 13  --  11 12 20   AST (SGOT) U/L 14  --  17 13 20   ALK PHOS U/L 58  --  70 78 80   BILIRUBIN mg/dL 0.2  --  0.3 0.3 0.2   EGFR IF NONAFRICN AM mL/min/1.73  --  119 92 96 138   GLOBULIN gm/dL 2.7  --  3.2 2.8 3.1   A/G RATIO g/dL 1.7  --  1.4 1.6 1.5   BUN / CREAT RATIO  16.7 16.4 14.5 15.1 24.1   ANION GAP mmol/L 8.9 13.0 11.0 9.0 11.3     Lab Results - Last 18 Months   Lab Units 06/02/22 0946 09/16/21 2204 08/13/21 1135 04/02/21 0235 12/09/20  1425   WBC 10*3/mm3 6.97 5.50 5.20 6.43 6.05   RBC 10*6/mm3 4.53 4.82 4.91 4.80 4.66   HEMOGLOBIN g/dL 11.9* 13.0 13.2 13.1 13.0   HEMATOCRIT % 37.2 40.2 41.2 40.2 39.4   MCV fL 82.1 83.4 83.9 83.8 84.5   MCH pg 26.3* 27.0 26.9 27.3 27.9   MCHC g/dL 32.0 32.3 32.0 32.6 33.0   RDW % 14.5 14.6 14.0 13.7 13.0   RDW-SD fl 43.1 44.3 42.5 41.8 39.8   MPV fL 10.4 10.3 10.5 10.1 10.6   PLATELETS 10*3/mm3 221 157 211 168 227     Lab Results - Last 18 Months   Lab Units 06/02/22  1322 09/16/21  2204 08/13/21  1135   HCG QUALITATIVE  Negative Negative Negative       Assessment        Diagnosis Plan   1. Female pelvic pain  US Non-ob Transvaginal    Chlamydia trachomatis, Neisseria gonorrhoeae, Trichomonas vaginalis, PCR - Urine, Urine, Clean  Catch   2. Female dyspareunia        Will check late GC chlamydia though expect to be negative.  Most likely UTI etiology for dyspareunia as has resolved unsure etiology for discharge with vaginitis panel negative could be atypical agent    Plan       No orders of the defined types were placed in this encounter.            This document has been electronically signed by Tyler Lock MD on June 6, 2022 13:03 CDT    Please note that portions of this note were completed with a voice recognition program.

## 2022-06-21 PROCEDURE — 87635 SARS-COV-2 COVID-19 AMP PRB: CPT | Performed by: FAMILY MEDICINE

## 2022-07-24 PROCEDURE — 87086 URINE CULTURE/COLONY COUNT: CPT | Performed by: NURSE PRACTITIONER

## 2022-08-05 DIAGNOSIS — B83.9 WORMS IN STOOL: Primary | ICD-10-CM

## 2022-08-05 RX ORDER — MEBENDAZOLE 100 MG/1
100 TABLET, CHEWABLE ORAL 2 TIMES DAILY
Qty: 6 TABLET | Refills: 0 | Status: SHIPPED | OUTPATIENT
Start: 2022-08-05 | End: 2022-08-08

## 2022-08-22 DIAGNOSIS — Z86.69 HISTORY OF MIGRAINE: Primary | ICD-10-CM

## 2022-08-22 PROCEDURE — 87635 SARS-COV-2 COVID-19 AMP PRB: CPT | Performed by: FAMILY MEDICINE

## 2022-09-04 PROCEDURE — 87635 SARS-COV-2 COVID-19 AMP PRB: CPT | Performed by: STUDENT IN AN ORGANIZED HEALTH CARE EDUCATION/TRAINING PROGRAM

## 2022-09-19 DIAGNOSIS — N89.8 VAGINAL DISCHARGE: Primary | ICD-10-CM

## 2022-09-20 ENCOUNTER — LAB (OUTPATIENT)
Dept: LAB | Facility: HOSPITAL | Age: 27
End: 2022-09-20

## 2022-09-20 DIAGNOSIS — N89.8 VAGINAL DISCHARGE: ICD-10-CM

## 2022-09-20 DIAGNOSIS — B37.31 VAGINAL CANDIDA: Primary | ICD-10-CM

## 2022-09-20 LAB
CANDIDA ALBICANS: POSITIVE
GARDNERELLA VAGINALIS: NEGATIVE
T VAGINALIS DNA VAG QL PROBE+SIG AMP: NEGATIVE

## 2022-09-20 PROCEDURE — 87510 GARDNER VAG DNA DIR PROBE: CPT

## 2022-09-20 PROCEDURE — 87480 CANDIDA DNA DIR PROBE: CPT

## 2022-09-20 PROCEDURE — 87660 TRICHOMONAS VAGIN DIR PROBE: CPT

## 2022-09-20 RX ORDER — FLUCONAZOLE 150 MG/1
TABLET ORAL
Qty: 2 TABLET | Refills: 0 | OUTPATIENT
Start: 2022-09-20 | End: 2022-11-23

## 2022-11-14 ENCOUNTER — HOSPITAL ENCOUNTER (EMERGENCY)
Facility: HOSPITAL | Age: 27
Discharge: HOME | End: 2022-11-14
Attending: STUDENT IN AN ORGANIZED HEALTH CARE EDUCATION/TRAINING PROGRAM | Admitting: STUDENT IN AN ORGANIZED HEALTH CARE EDUCATION/TRAINING PROGRAM

## 2022-11-14 ENCOUNTER — APPOINTMENT (OUTPATIENT)
Dept: CT IMAGING | Facility: HOSPITAL | Age: 27
End: 2022-11-14

## 2022-11-14 VITALS
SYSTOLIC BLOOD PRESSURE: 117 MMHG | RESPIRATION RATE: 18 BRPM | HEART RATE: 80 BPM | TEMPERATURE: 98 F | WEIGHT: 165 LBS | DIASTOLIC BLOOD PRESSURE: 79 MMHG | OXYGEN SATURATION: 98 % | HEIGHT: 69 IN | BODY MASS INDEX: 24.44 KG/M2

## 2022-11-14 DIAGNOSIS — K59.00 CONSTIPATION, UNSPECIFIED CONSTIPATION TYPE: ICD-10-CM

## 2022-11-14 DIAGNOSIS — M54.50 ACUTE BILATERAL LOW BACK PAIN WITHOUT SCIATICA: Primary | ICD-10-CM

## 2022-11-14 LAB
B-HCG UR QL: NEGATIVE
BACTERIA UR QL AUTO: ABNORMAL /HPF
BILIRUB UR QL STRIP: NEGATIVE
CLARITY UR: CLEAR
COLOR UR: YELLOW
GLUCOSE UR STRIP-MCNC: NEGATIVE MG/DL
HGB UR QL STRIP.AUTO: NEGATIVE
HYALINE CASTS UR QL AUTO: ABNORMAL /LPF
KETONES UR QL STRIP: ABNORMAL
LEUKOCYTE ESTERASE UR QL STRIP.AUTO: ABNORMAL
NITRITE UR QL STRIP: NEGATIVE
PH UR STRIP.AUTO: 6 [PH] (ref 5–9)
PROT UR QL STRIP: ABNORMAL
RBC # UR STRIP: ABNORMAL /HPF
REF LAB TEST METHOD: ABNORMAL
SP GR UR STRIP: 1.03 (ref 1–1.03)
SQUAMOUS #/AREA URNS HPF: ABNORMAL /HPF
UROBILINOGEN UR QL STRIP: ABNORMAL
WBC # UR STRIP: ABNORMAL /HPF

## 2022-11-14 PROCEDURE — 25010000002 KETOROLAC TROMETHAMINE PER 15 MG: Performed by: STUDENT IN AN ORGANIZED HEALTH CARE EDUCATION/TRAINING PROGRAM

## 2022-11-14 PROCEDURE — 96375 TX/PRO/DX INJ NEW DRUG ADDON: CPT

## 2022-11-14 PROCEDURE — 81025 URINE PREGNANCY TEST: CPT | Performed by: STUDENT IN AN ORGANIZED HEALTH CARE EDUCATION/TRAINING PROGRAM

## 2022-11-14 PROCEDURE — 96374 THER/PROPH/DIAG INJ IV PUSH: CPT

## 2022-11-14 PROCEDURE — 99283 EMERGENCY DEPT VISIT LOW MDM: CPT

## 2022-11-14 PROCEDURE — 81001 URINALYSIS AUTO W/SCOPE: CPT | Performed by: STUDENT IN AN ORGANIZED HEALTH CARE EDUCATION/TRAINING PROGRAM

## 2022-11-14 PROCEDURE — 74176 CT ABD & PELVIS W/O CONTRAST: CPT

## 2022-11-14 PROCEDURE — 72131 CT LUMBAR SPINE W/O DYE: CPT

## 2022-11-14 PROCEDURE — 25010000002 ORPHENADRINE CITRATE PER 60 MG: Performed by: STUDENT IN AN ORGANIZED HEALTH CARE EDUCATION/TRAINING PROGRAM

## 2022-11-14 RX ORDER — MELOXICAM 15 MG/1
15 TABLET ORAL DAILY
Qty: 5 TABLET | Refills: 0 | Status: SHIPPED | OUTPATIENT
Start: 2022-11-14 | End: 2023-02-01 | Stop reason: SDUPTHER

## 2022-11-14 RX ORDER — METHYLPREDNISOLONE 4 MG/1
TABLET ORAL
Qty: 21 TABLET | Refills: 0 | OUTPATIENT
Start: 2022-11-14 | End: 2022-11-23

## 2022-11-14 RX ORDER — MELOXICAM 15 MG/1
15 TABLET ORAL DAILY
Qty: 30 TABLET | Refills: 5 | OUTPATIENT
Start: 2022-11-14 | End: 2023-02-04

## 2022-11-14 RX ORDER — TIZANIDINE 4 MG/1
4 TABLET ORAL NIGHTLY PRN
Qty: 12 TABLET | Refills: 0 | OUTPATIENT
Start: 2022-11-14 | End: 2023-02-04

## 2022-11-14 RX ORDER — BACLOFEN 10 MG/1
10 TABLET ORAL 2 TIMES DAILY
Qty: 60 TABLET | Refills: 5 | OUTPATIENT
Start: 2022-11-14 | End: 2023-02-04

## 2022-11-14 RX ORDER — ORPHENADRINE CITRATE 30 MG/ML
60 INJECTION INTRAMUSCULAR; INTRAVENOUS ONCE
Status: COMPLETED | OUTPATIENT
Start: 2022-11-14 | End: 2022-11-14

## 2022-11-14 RX ORDER — KETOROLAC TROMETHAMINE 15 MG/ML
15 INJECTION, SOLUTION INTRAMUSCULAR; INTRAVENOUS ONCE
Status: COMPLETED | OUTPATIENT
Start: 2022-11-14 | End: 2022-11-14

## 2022-11-14 RX ADMIN — ORPHENADRINE CITRATE 60 MG: 30 INJECTION INTRAMUSCULAR; INTRAVENOUS at 15:39

## 2022-11-14 RX ADMIN — KETOROLAC TROMETHAMINE 15 MG: 15 INJECTION, SOLUTION INTRAMUSCULAR; INTRAVENOUS at 15:39

## 2022-11-14 NOTE — ED PROVIDER NOTES
Subjective   History of Present Illness  27-year-old female comes to the ER chief complaint of lower back pain.  It is 10/10 and sharp in nature.  She noticed some achiness in her lower back last night.  This morning she bent over to pick something up and the pain hit her suddenly.  Pain does not radiate anywhere.  No weakness or numbness.  No bladder or bowel incontinence.  Nothing is making her feel better.  Movement makes her feel worse.    History provided by:  Patient   used: No        Review of Systems   Constitutional: Negative for chills and fever.   HENT: Negative for drooling.    Eyes: Negative for redness.   Respiratory: Negative for shortness of breath.    Cardiovascular: Negative for chest pain.   Gastrointestinal: Negative for abdominal pain, nausea and vomiting.   Genitourinary: Negative for difficulty urinating, dysuria, flank pain, pelvic pain, vaginal bleeding and vaginal discharge.   Musculoskeletal: Positive for back pain. Negative for neck pain.   Skin: Negative for color change.   Neurological: Negative for seizures, weakness and numbness.   Psychiatric/Behavioral: Negative for confusion.       Past Medical History:   Diagnosis Date   • Abnormal EKG 08/18/2021   • Abnormal Pap smear of cervix    • Acute allergic reaction    • Acute bronchitis    • Acute pharyngitis    • Agoraphobia with panic attacks    • Allergic rhinitis    • Anemia    • Anxiety    • Anxiety state    • Asthma     Stable   • Back strain    • Constipation    • Cough    • Disturbance of skin sensation    • Gestational hypertension     with second pregnancy   • Headache    • History of transfusion     after second delivery   • HPV (human papilloma virus) infection    • Hx of preeclampsia, prior pregnancy, currently pregnant 01/02/2018   • Irregular periods    • Irritable bowel syndrome with constipation    • Low back pain    • Lumbosacral dysfunction    • Neck pain    • Palpitations    • Preeclampsia    • Rh  negative state in antepartum period, third trimester 12/07/2017   • Rhinitis    • Severe depression (HCC)    • Spasm     cervical spasm   • Spinal headache    • Upper respiratory infection    • Urinary tract infection 07/14/2017   • Vaginal irritation    • Vitreous floaters     prob, not seen on exam   • Wheezing        Allergies   Allergen Reactions   • Banana Angioedema and Hives     Itchy throat   • Latex Hives and Angioedema       Past Surgical History:   Procedure Laterality Date   • COLONOSCOPY N/A 3/7/2022    Procedure: COLONOSCOPY;  Surgeon: Sloan Sterling MD;  Location: [facility] ENDOSCOPY;  Service: Gastroenterology;  Laterality: N/A;   • PROCEDURE GENERIC CONVERTED  02/01/2016    Physical Therapy Consult   • WISDOM TOOTH EXTRACTION         Family History   Problem Relation Age of Onset   • Heart disease Mother    • Hypertension Mother    • Hyperthyroidism Mother    • Kidney failure Sister    • Heart failure Sister    • COPD Sister    • Cancer Other    • Diabetes Other    • Hypertension Other    • Stroke Other    • Thyroid disease Other    • Gallbladder disease Other         Gallstones   • Cervical cancer Maternal Grandmother    • Diabetes Maternal Grandmother    • No Known Problems Son    • Febrile seizures Daughter    • COPD Father    • Diabetes Paternal Grandmother    • Cirrhosis Paternal Grandmother    • Parkinsonism Maternal Grandfather    • Heart disease Maternal Grandfather    • Hypothyroidism Sister    • No Known Problems Son        Social History     Socioeconomic History   • Marital status:    Tobacco Use   • Smoking status: Never   • Smokeless tobacco: Never   Vaping Use   • Vaping Use: Never used   Substance and Sexual Activity   • Alcohol use: No   • Drug use: No   • Sexual activity: Yes     Partners: Male     Birth control/protection: None     Comment: last pap smear 4/16/19 negative            Objective    Vitals:    11/14/22 1414   BP: 157/97   BP Location: Right arm   Patient  "Position: Sitting   Pulse: 90   Resp: 18   Temp: 98 °F (36.7 °C)   TempSrc: Oral   SpO2: 100%   Weight: 74.8 kg (165 lb)   Height: 175.3 cm (69\")       Physical Exam  Vitals and nursing note reviewed.   Constitutional:       General: She is in acute distress.      Appearance: She is well-developed. She is not ill-appearing or diaphoretic.   HENT:      Head: Normocephalic.      Right Ear: External ear normal.      Left Ear: External ear normal.   Eyes:      Conjunctiva/sclera: Conjunctivae normal.   Pulmonary:      Effort: Pulmonary effort is normal. No accessory muscle usage or respiratory distress.   Musculoskeletal:      Cervical back: No tenderness or bony tenderness.      Thoracic back: No tenderness or bony tenderness.      Lumbar back: Tenderness and bony tenderness present. No swelling, deformity, signs of trauma or spasms.        Back:    Skin:     General: Skin is warm and dry.      Capillary Refill: Capillary refill takes less than 2 seconds.   Neurological:      Mental Status: She is oriented to person, place, and time.      Sensory: No sensory deficit.      Motor: No weakness.   Psychiatric:         Behavior: Behavior normal.         Procedures           ED Course      CT Abdomen Pelvis Without Contrast   Final Result   Appendix is normal.   Nonspecific fullness of the right ovary.   Moderate stool content in the right colon.   No evidence of obstructive uropathy on either side.   No evidence of bowel obstruction or perienteric inflammation.      Electronically signed by:  Osbaldo Junior MD  11/14/2022 4:53 PM CST   Workstation: [id number]      CT Lumbar Spine Without Contrast   Final Result   Mild-to-moderate disc osteophyte complex eccentric to the right   side at L5-S1, with probable encroachment on the right S1 nerve   root within the spinal canal.   No acute displaced fracture, or traumatic subluxation based on   current assessment.   Correlation with patient's symptoms and history is recommended.    "   Electronically signed by:  Osbaldo Junior MD  11/14/2022 4:59 PM [facility]   Workstation: [id number]                [facility]  Number of Diagnoses or Management Options  Acute bilateral low back pain without sciatica: new and requires workup  Constipation, unspecified constipation type: new and requires workup  Diagnosis management comments: Vital signs are stable, afebrile.  UA is unremarkable.  Pregnancy test is negative.  CT abdomen, pelvis, lumbar spine negative for acute findings.  Some constipation noted.  Recommend follow-up with her PCP.  Muscle relaxant and anti-inflammatory called in.  Return precautions given.  Patient states understanding and is agreeable to the plan.      Final diagnoses:   Acute bilateral low back pain without sciatica   Constipation, unspecified constipation type       ED Disposition  ED Disposition     ED Disposition   Discharge    Condition   Stable    Comment   --             Brandt Rios, Linnea DONOHUE, APRN  [address]  [address] [place] [id number] [phone]    Schedule an appointment as soon as possible for a visit in 2 days  ER follow up         Medication List      New Prescriptions    tiZANidine 4 MG tablet  Commonly known as: Zanaflex  Take 1 tablet by mouth At Night As Needed for Muscle Spasms.        Changed    * meloxicam 15 MG tablet  Commonly known as: Mobic  Take 1 tablet by mouth Daily.  What changed: Another medication with the same name was added. Make sure you understand how and when to take each.     * meloxicam 15 MG tablet  Commonly known as: MOBIC  Take 1 tablet by mouth Daily.  What changed: You were already taking a medication with the same name, and this prescription was added. Make sure you understand how and when to take each.         * This list has 2 medication(s) that are the same as other medications prescribed for you. Read the directions carefully, and ask your doctor or other care provider to review them with you.               Where to Get Your  Medications      These medications were sent to [id number] - [place] - [address] - [phone] PH - [phone]   [address], [place] [address]    Phone: [phone]   · meloxicam 15 MG tablet  · tiZANidine 4 MG tablet          Brayden Arrington MD  11/14/22 9506

## 2022-11-16 ENCOUNTER — OFFICE VISIT (OUTPATIENT)
Dept: FAMILY MEDICINE CLINIC | Facility: CLINIC | Age: 27
End: 2022-11-16

## 2022-11-16 VITALS
HEIGHT: 69 IN | HEART RATE: 96 BPM | WEIGHT: 164 LBS | DIASTOLIC BLOOD PRESSURE: 80 MMHG | BODY MASS INDEX: 24.29 KG/M2 | SYSTOLIC BLOOD PRESSURE: 120 MMHG | OXYGEN SATURATION: 97 %

## 2022-11-16 DIAGNOSIS — M54.41 ACUTE BILATERAL LOW BACK PAIN WITH RIGHT-SIDED SCIATICA: ICD-10-CM

## 2022-11-16 DIAGNOSIS — M54.31 RIGHT SIDED SCIATICA: Primary | ICD-10-CM

## 2022-11-16 PROCEDURE — 96372 THER/PROPH/DIAG INJ SC/IM: CPT | Performed by: NURSE PRACTITIONER

## 2022-11-16 PROCEDURE — 99213 OFFICE O/P EST LOW 20 MIN: CPT | Performed by: NURSE PRACTITIONER

## 2022-11-16 RX ORDER — KETOROLAC TROMETHAMINE 30 MG/ML
30 INJECTION, SOLUTION INTRAMUSCULAR; INTRAVENOUS EVERY 6 HOURS PRN
Status: SHIPPED | OUTPATIENT
Start: 2022-11-16 | End: 2022-11-21

## 2022-11-16 RX ORDER — ACETAMINOPHEN AND CODEINE PHOSPHATE 300; 30 MG/1; MG/1
1 TABLET ORAL EVERY 4 HOURS PRN
Qty: 20 TABLET | Refills: 0 | Status: SHIPPED | OUTPATIENT
Start: 2022-11-16 | End: 2023-03-08

## 2022-11-16 RX ADMIN — KETOROLAC TROMETHAMINE 30 MG: 30 INJECTION, SOLUTION INTRAMUSCULAR; INTRAVENOUS at 08:38

## 2022-11-16 NOTE — PROGRESS NOTES
Chief Complaint   Patient presents with   • Back Pain     Pain since Mon     Subjective   Nirmala Tamayo is a 27 y.o. female.           Back Pain  This is a recurrent problem. The current episode started in the past 7 days. The problem occurs daily. The problem has been gradually worsening since onset. The pain is present in the lumbar spine. The quality of the pain is described as burning. The pain radiates to the right foot, right knee and right thigh. The pain is at a severity of 10/10. The pain is severe. The pain is the same all the time. Stiffness is present all day. Associated symptoms include numbness, paresis, paresthesias and weakness. Pertinent negatives include no abdominal pain, bladder incontinence, bowel incontinence, chest pain, dysuria, pelvic pain or perianal numbness. Risk factors include sedentary lifestyle. She has tried analgesics, NSAIDs, heat and muscle relaxant for the symptoms. The treatment provided mild relief.        The following portions of the patient's history were reviewed and updated as appropriate: allergies, current medications, past social history and problem list.    Review of Systems   Eyes: Negative.    Respiratory: Negative.    Cardiovascular: Negative.  Negative for chest pain.   Gastrointestinal: Negative.  Negative for abdominal pain and bowel incontinence.   Endocrine: Negative.    Genitourinary: Negative.  Negative for bladder incontinence, dysuria and pelvic pain.   Musculoskeletal: Positive for arthralgias, back pain, gait problem, joint swelling and myalgias. Negative for neck pain and neck stiffness.        Lumbar pain with radiation of pain    Allergic/Immunologic: Negative.    Neurological: Positive for weakness, numbness and paresthesias.   Hematological: Negative.  Negative for adenopathy. Does not bruise/bleed easily.   Psychiatric/Behavioral: Negative.  Negative for agitation, behavioral problems, confusion, decreased concentration and  "hallucinations. The patient is not hyperactive.        Objective   /80   Pulse 96   Ht 175.3 cm (69\")   Wt 74.4 kg (164 lb)   SpO2 97%   BMI 24.22 kg/m²   Physical Exam  Vitals and nursing note reviewed.   Constitutional:       Appearance: She is well-developed.   HENT:      Head: Normocephalic and atraumatic.   Eyes:      General: Lids are normal.      Conjunctiva/sclera: Conjunctivae normal.   Neck:      Thyroid: No thyroid mass or thyromegaly.      Trachea: Trachea normal. No tracheal tenderness.   Cardiovascular:      Rate and Rhythm: Normal rate.      Pulses: Normal pulses.      Heart sounds: Normal heart sounds.   Pulmonary:      Effort: Pulmonary effort is normal. No respiratory distress.      Breath sounds: Normal breath sounds. No wheezing.   Abdominal:      General: Bowel sounds are normal. There is no distension.      Palpations: Abdomen is soft. There is no mass.      Tenderness: There is no abdominal tenderness.   Musculoskeletal:         General: Tenderness present. No swelling or deformity. Normal range of motion.      Cervical back: Normal range of motion. No edema.      Lumbar back: Spasms and tenderness present.        Back:       Right lower leg: No edema.   Lymphadenopathy:      Head:      Right side of head: No submental, submandibular or tonsillar adenopathy.      Left side of head: No submental, submandibular or tonsillar adenopathy.   Skin:     General: Skin is warm and dry.      Coloration: Skin is not jaundiced or pale.      Findings: No abrasion, bruising, erythema or lesion.   Neurological:      Mental Status: She is alert and oriented to person, place, and time.   Psychiatric:         Mood and Affect: Mood is not anxious. Affect is not inappropriate.         Speech: Speech normal.         Behavior: Behavior normal.         Thought Content: Thought content normal.         Judgment: Judgment normal. Judgment is not impulsive.              Assessment & Plan     Problems Addressed " this Visit    None  Visit Diagnoses     Right sided sciatica    -  Primary    Relevant Medications    acetaminophen-codeine (TYLENOL #3) 300-30 MG per tablet    ketorolac (TORADOL) injection 30 mg    Other Relevant Orders    Ambulatory Referral to Physical Therapy Evaluate and treat    Urinalysis With Microscopic - Urine, Clean Catch    Acute bilateral low back pain with right-sided sciatica        Relevant Medications    ketorolac (TORADOL) injection 30 mg    Other Relevant Orders    Urinalysis With Microscopic - Urine, Clean Catch      Diagnoses       Codes Comments    Right sided sciatica    -  Primary ICD-10-CM: M54.31  ICD-9-CM: 724.3     Acute bilateral low back pain with right-sided sciatica     ICD-10-CM: M54.41  ICD-9-CM: 724.2, 724.3, 338.19            New Medications Ordered This Visit   Medications   • acetaminophen-codeine (TYLENOL #3) 300-30 MG per tablet     Sig: Take 1 tablet by mouth Every 4 (Four) Hours As Needed for Moderate Pain.     Dispense:  20 tablet     Refill:  0   • ketorolac (TORADOL) injection 30 mg     Current Outpatient Medications on File Prior to Visit   Medication Sig Dispense Refill   • albuterol sulfate  (90 Base) MCG/ACT inhaler Inhale 2 puffs Every 4 (Four) Hours As Needed for Wheezing. 18 g 11   • baclofen (LIORESAL) 10 MG tablet Take 1 tablet by mouth 2 (Two) Times a Day. 60 tablet 5   • beclomethasone (Qvar) 40 MCG/ACT inhaler Inhale 2 puffs 2 (Two) Times a Day. 8.6 g 11   • cetirizine (zyrTEC) 10 MG tablet Take 1 tablet by mouth Daily. 30 tablet 0   • clonazePAM (KlonoPIN) 0.5 MG tablet Take 1 tablet by mouth 2 (Two) Times a Day As Needed for Anxiety. 60 tablet 0   • EPINEPHrine (EpiPen 2-Augusto) 0.3 MG/0.3ML solution auto-injector injection Inject once for allergic reaction as instructed and proceed to the nearest Emergency Room for further treatment 2 each 1   • fluconazole (Diflucan) 150 MG tablet Take 1 tablet by mouth today and repeat in 3 days. 2 tablet 0   •  linaclotide (LINZESS) 290 MCG capsule capsule Take 290 mcg by mouth Every Morning Before Breakfast.     • meloxicam (Mobic) 15 MG tablet Take 1 tablet by mouth Daily. 30 tablet 5   • meloxicam (MOBIC) 15 MG tablet Take 1 tablet by mouth Daily. 5 tablet 0   • methylPREDNISolone (MEDROL) 4 MG dose pack Take as directed on package instructions. 21 tablet 0   • ondansetron (Zofran) 8 MG tablet Take 1 tablet by mouth Every 8 (Eight) Hours As Needed for Nausea or Vomiting. 20 tablet 6   • tiZANidine (Zanaflex) 4 MG tablet Take 1 tablet by mouth At Night As Needed for Muscle Spasms. 12 tablet 0   • [DISCONTINUED] montelukast (Singulair) 10 MG tablet Take 1 tablet by mouth Every Night. 30 tablet 11     No current facility-administered medications on file prior to visit.        Follow Up   No follow-ups on file.      COMPARISON:         TECHNIQUE:   CT of the lumbar spine was obtained and reformatted in the  sagittal and coronal planes. In addition, axial plane reformats  were obtained of the intervertebral discs as necessary.      This examination was performed according to our departmental dose  optimization program which includes automated exposure control,  adjustment of the MA and kV according to patient size, and/or use  of iterative reconstruction technique.     FINDINGS:     NUMBERING:  Five lumbar type vertebral bodies are demonstrated.     ALIGNMENT: Normal. No evidence of spondylolisthesis.     VERTEBRA: Normal height. No evidence of compression fracture.  Intact transverse process at all levels     SACRUM: Partially seen. Unremarkable.     PARASPINAL SOFT TISSUES: No mass, vascular abnormality, or  significant soft tissue abnormality.     Mild-to-moderate disc osteophyte complex eccentric to the right  side at L5-S1, with probable encroachment on the right S1 nerve  root within the spinal canal.     IMPRESSION:  Mild-to-moderate disc osteophyte complex eccentric to the right  side at L5-S1, with probable  encroachment on the right S1 nerve  root within the spinal canal.  No acute displaced fracture, or traumatic subluxation based on  current assessment.  Correlation with patient's symptoms and history is recommended.     Electronically signed by:  Osbaldo Junior MD  11/14/2022 4:59 PM [facility]  Workstation: [id number]     Imaging    CT Lumbar Spine Without Contrast (Order: [id number]) - 11/14/2022  Result History    CT Lumbar Spine Without Contrast (Order #[id number]) on 11/14/2022 - Order Result History Report  Reprint Order Requisition    CT Lumbar Spine Without Contrast (Order #[id number]) on 11/14/22       Provider Comment To Patient     Add Comments   Seen       CT Lumbar Spine Without Contrast: Patient Communication     Add Comments   Seen     Signed by    Signed Date/Time  Phone Pager   OSBALDO JUNIOR 11/14/2022  4:59 PM [phone]  4:59 [phone]      Exam Information    Status Exam Begun  Exam Ended    Final [99] 11/14/2022 16:02 11/14/2022  4:06 PM [id number]  4:06 PM     Questions: CT Abdomen Pelvis Without Contrast  Will Oral Contrast be needed for this procedure? No   Patient Pregnant Unknown     Questions: CT Lumbar Spine Without Contrast  Patient Pregnant Unknown     External Results Report    Open External Results Report    Encounter    View Encounter             Intra Procedure Documentation    Intra Procedure Documentation     Order-Level Documents:    There are no order-level documents.    Encounter-Level Documents on 11/14/2022:    Document on 11/14/2022 1725 by Brayden Arrington MD: ED After Visit Summary  Electronic signature on 11/14/2022 1427 - E-signed  Scan on 11/14/2022 by [employer], [place]: AVS SIGNATURE,SHEET,BD,11/14/2022                        Reason for Exam  Priority: STAT  back pain     Results Routing Tracking    View Results Routing Information       Order Report    .kts Order Details    toradol as directed  meds as directed  Follow up with pt as directed  Tylenol 3 prn   Hospital chart  reviewed        Follow up if worsen     Patient understands the risks associated with this controlled medication, including tolerance and addiction.  she also agrees to only obtain this medication from me, and not from a another provider, unless that provider is covering for me in my absence.  she also agrees to be compliant in dosing, and not self adjust the dose of medication.  A signed controlled substance agreement is on file, and she has received a controlled substance education sheet at this a previous visit.  she has also signed a consent for treatment with a controlled substance as per [facility] policy. SANGEETHA was obtained.

## 2022-11-17 ENCOUNTER — LAB (OUTPATIENT)
Dept: LAB | Facility: HOSPITAL | Age: 27
End: 2022-11-17

## 2022-11-17 PROCEDURE — 81001 URINALYSIS AUTO W/SCOPE: CPT | Performed by: NURSE PRACTITIONER

## 2022-11-18 ENCOUNTER — HOSPITAL ENCOUNTER (OUTPATIENT)
Dept: PHYSICAL THERAPY | Facility: HOSPITAL | Age: 27
Setting detail: THERAPIES SERIES
Discharge: HOME | End: 2022-11-18

## 2022-11-18 DIAGNOSIS — M54.31 RIGHT SIDED SCIATICA: Primary | ICD-10-CM

## 2022-11-18 LAB
BACTERIA UR QL AUTO: ABNORMAL /HPF
BILIRUB UR QL STRIP: NEGATIVE
CLARITY UR: ABNORMAL
COLOR UR: YELLOW
GLUCOSE UR STRIP-MCNC: NEGATIVE MG/DL
HGB UR QL STRIP.AUTO: NEGATIVE
HYALINE CASTS UR QL AUTO: ABNORMAL /LPF
KETONES UR QL STRIP: NEGATIVE
LEUKOCYTE ESTERASE UR QL STRIP.AUTO: ABNORMAL
MUCOUS THREADS URNS QL MICRO: ABNORMAL /HPF
NITRITE UR QL STRIP: NEGATIVE
PH UR STRIP.AUTO: 6 [PH] (ref 5–8)
PROT UR QL STRIP: ABNORMAL
RBC # UR STRIP: ABNORMAL /HPF
REF LAB TEST METHOD: ABNORMAL
SP GR UR STRIP: 1.03 (ref 1–1.03)
SQUAMOUS #/AREA URNS HPF: ABNORMAL /HPF
UROBILINOGEN UR QL STRIP: ABNORMAL
WBC # UR STRIP: ABNORMAL /HPF

## 2022-11-18 PROCEDURE — 97161 PT EVAL LOW COMPLEX 20 MIN: CPT | Performed by: PHYSICAL THERAPIST

## 2022-11-18 PROCEDURE — 97110 THERAPEUTIC EXERCISES: CPT | Performed by: PHYSICAL THERAPIST

## 2022-11-18 RX ORDER — CIPROFLOXACIN 500 MG/1
500 TABLET, FILM COATED ORAL 2 TIMES DAILY
Qty: 14 TABLET | Refills: 0 | Status: SHIPPED | OUTPATIENT
Start: 2022-11-18 | End: 2022-11-30

## 2022-11-18 NOTE — THERAPY EVALUATION
Outpatient Physical Therapy Ortho Initial Evaluation  [facility]     Patient Name: Nirmala Tamayo  : 1995  MRN: [id number]  Today's Date: 2022      Visit Date: 2022  Visit number:     % Improvement:   griselda SMITH appointment:   No fu  Recert date:  22    Visit Diagnosis:  Low back pain        Patient Active Problem List   Diagnosis   • Sore throat   • Anxiety   • Low back pain with radiation   • Lumbar disc disorder   • Irritable bowel syndrome with constipation   • Epigastric pain   • Acute upper respiratory infection   • Left otitis media   • Cough   • Abnormal EKG   • Palpitations   • Lower abdominal pain   • Blood in stool        Past Medical History:   Diagnosis Date   • Abnormal EKG 2021   • Abnormal Pap smear of cervix    • Acute allergic reaction    • Acute bronchitis    • Acute pharyngitis    • Agoraphobia with panic attacks    • Allergic rhinitis    • Anemia    • Anxiety    • Anxiety state    • Asthma     Stable   • Back strain    • Constipation    • Cough    • Disturbance of skin sensation    • Gestational hypertension     with second pregnancy   • Headache    • History of transfusion     after second delivery   • HPV (human papilloma virus) infection    • Hx of preeclampsia, prior pregnancy, currently pregnant 2018   • Irregular periods    • Irritable bowel syndrome with constipation    • Low back pain    • Lumbosacral dysfunction    • Neck pain    • Palpitations    • Preeclampsia    • Rh negative state in antepartum period, third trimester 2017   • Rhinitis    • Severe depression (HCC)    • Spasm     cervical spasm   • Spinal headache    • Upper respiratory infection    • Urinary tract infection 2017   • Vaginal irritation    • Vitreous floaters     prob, not seen on exam   • Wheezing         Past Surgical History:   Procedure Laterality Date   • COLONOSCOPY N/A 3/7/2022    Procedure: COLONOSCOPY;  Surgeon: Sloan Sterling MD;  Location:  [facility] ENDOSCOPY;  Service: Gastroenterology;  Laterality: N/A;   • PROCEDURE GENERIC CONVERTED  02/01/2016    Physical Therapy Consult   • WISDOM TOOTH EXTRACTION         Visit Dx:     ICD-10-CM ICD-9-CM   1. Right sided sciatica  M54.31 724.3          Patient History     Row Name 11/18/22 0900             History    Chief Complaint Pain  -SW      Brief Description of Current Complaint Patient reports gradual onset low back pain about 5 years ago. She went to a concert about a week ago and stood for a long period of time in boots which seemed to aggravate back pain. A couple of days later she bent over to  a sock and this exacerbated pain further. She has difficulty bending forward. Standing is better than sitting. Pain is intermittent in bilateral low back and extends into bilateral hips coming around to fron of thighs. Pain is like lightning, throbbing, and toothache. Patient denies N/T, LE weakness, and bowel and bladder incontinence. Pain is better in the morning and worse in the evenings. Patient had 4 kids between 2014 and 2020.  -SW      Patient/Caregiver Goals Relieve pain;Return to prior level of function  -SW      Occupation/sports/leisure activities takes care of 4 kids, housework  -SW      Results of Clinical Tests CT 11-14-22: Mild-to-moderate disc osteophyte complex eccentric to the right  side at L5-S1, with probable encroachment on the right S1 nerve  root within the spinal canal.  -SW         Pain     Pain Location Back  -SW      Pain at Present 5  -SW      Pain at Best 3  -SW      Pain at Worst 10  -SW      Is your sleep disturbed? Yes  -SW      Is medication used to assist with sleep? No  -SW         Daily Activities    Primary Language English  -            User Key  (r) = Recorded By, (t) = Taken By, (c) = Cosigned By    Initials Name Provider Type    SW Shahida Saleh Physical Therapist                 PT Ortho     Row Name 11/18/22 1100       General ROM    RT Lower Ext --   -SLR/Fabere bilaterally  -SW    Row Name 11/18/22 0900       Subjective Comments    Subjective Comments See pt hx  -SW       Precautions and Contraindications    Precautions latex allergy   -SW       Posture/Observations    Posture/Observations Comments IC/PSIS level in standing, good SIJ movement  -SW       Special Tests/Palpation    Special Tests/Palpation Lumbar/SI  -SW       General ROM    Head/Neck/Trunk Trunk Extension;Trunk Flexion;Trunk Lt Lateral Flexion;Trunk Rt Lateral Flexion  -SW       Head/Neck/Trunk    Trunk Extension AROM 10  -SW    Trunk Flexion AROM 30  -SW    Trunk Lt Lateral Flexion AROM wnl  -SW    Trunk Rt Lateral Flexion AROM wnl  -SW       MMT (Manual Muscle Testing)    Rt Lower Ext Rt Hip Flexion;Rt Hip Extension;Rt Knee Extension;Rt Knee Flexion;Rt Ankle Plantarflexion;Rt Ankle Dorsiflexion;Rt Ankle Subtalar Eversion;Rt MTP Extension  -SW    Lt Lower Ext Lt Hip Flexion;Lt Knee Extension;Lt Knee Flexion;Lt Ankle Plantarflexion;Lt Ankle Dorsiflexion;Lt Ankle Subtalar Eversion;Lt MTP Extension  -SW       MMT Right Lower Ext    Rt Hip Flexion MMT, Gross Movement (5/5) normal  -SW    Rt Knee Extension MMT, Gross Movement (5/5) normal  -SW    Rt Knee Flexion MMT, Gross Movement (5/5) normal  -SW    Rt Ankle Plantarflexion MMT, Gross Movement (5/5) normal  -SW    Rt Ankle Dorsiflexion MMT, Gross Movement (5/5) normal  -SW    Rt Ankle Subtalar Eversion MMT, Gross Movement (5/5) normal  -SW    Rt MTP Extension MMT, Gross Movement (5/5) normal  -SW       MMT Left Lower Ext    Lt Hip Flexion MMT, Gross Movement (5/5) normal  -SW    Lt Knee Extension MMT, Gross Movement (5/5) normal  -SW    Lt Knee Flexion MMT, Gross Movement (5/5) normal  -SW    Lt Ankle Plantarflexion MMT, Gross Movement (5/5) normal  -SW    Lt Ankle Dorsiflexion MMT, Gross Movement (5/5) normal  -SW    Lt Ankle Subtalar Eversion MMT, Gross Movement (5/5) normal  -SW    Lt MTP Extension MMT, Gross Movement (5/5) normal  -SW           "User Key  (r) = Recorded By, (t) = Taken By, (c) = Cosigned By    Initials Name Provider Type    Shahida Guillaume Physical Therapist                            Therapy Education  Education Details: ltr,pelvic rock,dktc,sciatic nerve glide,nithin,cat and camel, phyllis pose, lifting mechanics  Given: HEP, Symptoms/condition management, Posture/body mechanics  Program: New  How Provided: Verbal, Demonstration, Written  Provided to: Patient  Level of Understanding: Teach back education performed, Verbalized, Demonstrated      PT OP Goals     Row Name 11/18/22 0900          PT Short Term Goals    STG Date to Achieve 12/09/22  -SW     STG 1 independent and compliantwith hep  -SW     STG 2 understand and demonstrate good mechanics  -SW        Long Term Goals    LTG Date to Achieve 12/30/22  -SW     LTG 1 trunk AROM WNL's and pain free  -SW     LTG 2 able to perform plow trhough full ROM without pain  -SW     LTG 3 able to hold prone plank 30\"  -SW     LTG 4 able to hold side plank 30\"  -SW     LTG 5 reports pain at 2/10 or less for a week  -     LTG 6 able to  items from floor without difficulty or pain  -        Time Calculation    PT Goal Re-Cert Due Date 12/09/22  -SW           User Key  (r) = Recorded By, (t) = Taken By, (c) = Cosigned By    Initials Name Provider Type    Shahida Guillaume Physical Therapist                 PT Assessment/Plan     Row Name 11/18/22 0900          PT Assessment    Functional Limitations Decreased safety during functional activities;Limitation in home management;Limitations in community activities;Limitations in functional capacity and performance;Performance in leisure activities;Performance in self-care ADL  -     Impairments Balance;Impaired muscle endurance;Impaired muscle length;Muscle strength;Pain;Poor body mechanics;Range of motion  -     Assessment Comments 27 yof presents with recurrent low back pain that extends into bilateral hips warapping around to anterior thighs. " "She exhibits good sacroiliac movement and equal bilaterally. She exhibits marked tightness in low back, gluteal and hamstring musculature and weakness in core as well as tension on sciatic nerve when placed in lengthened position.  -SW     Rehab Potential Good  -SW     Patient/caregiver participated in establishment of treatment plan and goals Yes  -SW     Patient would benefit from skilled therapy intervention Yes  -SW        PT Plan    PT Frequency 2x/week  -SW     Predicted Duration of Therapy Intervention (PT) 6 weeks  -SW     Planned CPT's? PT EVAL LOW COMPLEXITY: 64671;PT RE-EVAL: 28910;PT THER PROC EA 15 MIN: 71957;PT THER ACT EA 15 MIN: 08912;PT MANUAL THERAPY EA 15 MIN: 29649;PT NEUROMUSC RE-EDUCATION EA 15 MIN: 04923;PT SELF CARE/HOME MGMT/TRAIN EA 15: 16784;PT HOT OR COLD PACK TREAT MCARE  -SW     Physical Therapy Interventions (Optional Details) balance training;home exercise program;manual therapy techniques;modalities;neuromuscular re-education;patient/family education;ROM (Range of Motion);strengthening;stretching  -SW     PT Plan Comments Focus on posterior muscle flexibility initially as well as sciatic nerve gliding and progress to core strengrhening.  -SW           User Key  (r) = Recorded By, (t) = Taken By, (c) = Cosigned By    Initials Name Provider Type     Shahida Saleh Physical Therapist                   OP Exercises     Row Name 11/18/22 0900             Subjective Comments    Subjective Comments See pt hx  -SW         Exercise 1    Exercise Name 1 lower trunk rotation  -SW      Reps 1 10  -SW         Exercise 2    Exercise Name 2 pelvic rock  -SW      Reps 2 10  -SW         Exercise 3    Exercise Name 3 sciatic nerve glide  -SW      Reps 3 20  -SW         Exercise 4    Exercise Name 4 double knee to chest  -SW      Reps 4 1x60\"  -SW         Exercise 5    Exercise Name 5 prone on elbows  -SW      Time 5 2'  -SW         Exercise 6    Exercise Name 6 cat and camel  -SW      Reps 6 10  -SW  "        Exercise 7    Exercise Name 7 child's pose  -      Time 7 1'  -SW            User Key  (r) = Recorded By, (t) = Taken By, (c) = Cosigned By    Initials Name Provider Type    Shahida Guillaume Physical Therapist                                        Time Calculation:     Start Time: 0940  Stop Time: 1017  Time Calculation (min): 37 min     Therapy Charges for Today     Code Description Service Date Service Provider Modifiers Qty    [id number] HC PT THER PROC EA 15 MIN 11/18/2022 Shahida Saleh GP 1    [id number]  PT EVAL LOW COMPLEXITY 2 11/18/2022 Shahida Saleh GP 1                    Shahida Saleh  11/18/2022

## 2022-11-21 ENCOUNTER — APPOINTMENT (OUTPATIENT)
Dept: PHYSICAL THERAPY | Facility: HOSPITAL | Age: 27
End: 2022-11-21

## 2022-11-21 RX ORDER — ONDANSETRON 4 MG/1
4 TABLET, ORALLY DISINTEGRATING ORAL EVERY 8 HOURS PRN
Qty: 15 TABLET | Refills: 0 | Status: SHIPPED | OUTPATIENT
Start: 2022-11-21 | End: 2022-12-19 | Stop reason: SDUPTHER

## 2022-11-23 ENCOUNTER — HOSPITAL ENCOUNTER (OUTPATIENT)
Dept: PHYSICAL THERAPY | Facility: HOSPITAL | Age: 27
Setting detail: THERAPIES SERIES
Discharge: HOME | End: 2022-11-23

## 2022-11-23 DIAGNOSIS — M54.31 RIGHT SIDED SCIATICA: Primary | ICD-10-CM

## 2022-11-23 PROCEDURE — 97140 MANUAL THERAPY 1/> REGIONS: CPT

## 2022-11-23 NOTE — THERAPY TREATMENT NOTE
Outpatient Physical Therapy Ortho Treatment Note  [facility]     Patient Name: Nirmala Tamayo  : 1995  MRN: [id number]  Today's Date: 2022      Visit Date: 2022     Subjective Improvement 0  Visits 2/3  Visits approved ?  RTMD PRN  Recert Date 2022    Low Back pain    Visit Dx:    ICD-10-CM ICD-9-CM   1. Right sided sciatica  M54.31 724.3       Patient Active Problem List   Diagnosis   • Sore throat   • Anxiety   • Low back pain with radiation   • Lumbar disc disorder   • Irritable bowel syndrome with constipation   • Epigastric pain   • Acute upper respiratory infection   • Left otitis media   • Cough   • Abnormal EKG   • Palpitations   • Lower abdominal pain   • Blood in stool        Past Medical History:   Diagnosis Date   • Abnormal EKG 2021   • Abnormal Pap smear of cervix    • Acute allergic reaction    • Acute bronchitis    • Acute pharyngitis    • Agoraphobia with panic attacks    • Allergic rhinitis    • Anemia    • Anxiety    • Anxiety state    • Asthma     Stable   • Back strain    • Constipation    • Cough    • Disturbance of skin sensation    • Gestational hypertension     with second pregnancy   • Headache    • History of transfusion     after second delivery   • HPV (human papilloma virus) infection    • Hx of preeclampsia, prior pregnancy, currently pregnant 2018   • Irregular periods    • Irritable bowel syndrome with constipation    • Low back pain    • Lumbosacral dysfunction    • Neck pain    • Palpitations    • Preeclampsia    • Rh negative state in antepartum period, third trimester 2017   • Rhinitis    • Severe depression (HCC)    • Spasm     cervical spasm   • Spinal headache    • Upper respiratory infection    • Urinary tract infection 2017   • Vaginal irritation    • Vitreous floaters     prob, not seen on exam   • Wheezing         Past Surgical History:   Procedure Laterality Date   • COLONOSCOPY N/A 3/7/2022    Procedure:  COLONOSCOPY;  Surgeon: Sloan Sterling MD;  Location: [facility] ENDOSCOPY;  Service: Gastroenterology;  Laterality: N/A;   • PROCEDURE GENERIC CONVERTED  02/01/2016    Physical Therapy Consult   • WISDOM TOOTH EXTRACTION                          PT Assessment/Plan     Row Name 11/23/22 1121          PT Assessment    Assessment Comments Patient presents with R SI post rotation, lumbar rotation and right sacral rotation which was corrected using ME.  Patient did have decrease pain afterward but cont to report pain with lumbar fl.  -CP        PT Plan    PT Frequency 2x/week  -CP     Predicted Duration of Therapy Intervention (PT) 6 weeks  -CP     PT Plan Comments Cont with POC.  monitor rotations and teach self SI correction  -CP           User Key  (r) = Recorded By, (t) = Taken By, (c) = Cosigned By    Initials Name Provider Type    Addis Ortiz PTA Physical Therapist Assistant                 Modalities     Row Name 11/23/22 1100             Ice    Ice Applied Yes  -CP      Location low back in sitting  -CP      PT Ice Rx Minutes 15  -CP      Ice S/P Rx Yes  -CP            User Key  (r) = Recorded By, (t) = Taken By, (c) = Cosigned By    Initials Name Provider Type    Addis Ortiz PTA Physical Therapist Assistant               OP Exercises     Row Name 11/23/22 1123 11/23/22 1100          Subjective Comments    Subjective Comments -- patient states that she has had pain for  over a week now.  She does have a lot of pain when sitting and trying to get up.  Patient did go to urgent care this morning to rule out the flu.  All test where negetive but patient is not feeling the best  -CP        Subjective Pain    Able to rate subjective pain? -- yes  -CP     Pre-Treatment Pain Level -- 4  -CP     Post-Treatment Pain Level -- 2  -CP        Total Minutes    78276 - PT Therapeutic Exercise Minutes 5  -CP --     51799 - PT Manual Therapy Minutes 15  -CP --        Exercise 1    Exercise Name 1 -- LTR stretch   "-CP     Reps 1 -- 20  -CP        Exercise 2    Exercise Name 2 -- bridging  -CP     Cueing 2 -- Verbal  -CP     Reps 2 -- 20  -CP        Exercise 3    Exercise Name 3 -- see manual  -CP        Exercise 4    Exercise Name 4 -- Supine TA with hip AD squeezes  -CP     Cueing 4 -- Verbal;Tactile  -CP     Sets 4 -- 2  -CP     Reps 4 -- 10  -CP     Time 4 -- 5\" holds  -CP           User Key  (r) = Recorded By, (t) = Taken By, (c) = Cosigned By    Initials Name Provider Type    CP Addis Norwood, [employer] Physical Therapist Assistant                         Manual Rx (last 36 hours)     Manual Treatments     Row Name 11/23/22 1123 11/23/22 1100          Total Minutes    14932 - PT Manual Therapy Minutes 15  -CP --        Manual Rx 1    Manual Rx 1 Location -- R SI  -CP     Manual Rx 1 Type -- ME to correct right post SI rotation  -CP        Manual Rx 2    Manual Rx 2 Location -- R Sacral  -CP     Manual Rx 2 Type -- ME to correct right elevated sacral  -CP        Manual Rx 3    Manual Rx 3 Location -- lumber  -CP     Manual Rx 3 Type -- lumbar rolls  -CP           User Key  (r) = Recorded By, (t) = Taken By, (c) = Cosigned By    Initials Name Provider Type    CP Addis Norwood PTA Physical Therapist Assistant                 PT OP Goals     Row Name 11/23/22 1100          PT Short Term Goals    STG Date to Achieve 12/09/22  -CP     STG 1 independent and compliantwith hep  -CP     STG 2 understand and demonstrate good mechanics  -CP        Long Term Goals    LTG Date to Achieve 12/30/22  -CP     LTG 1 trunk AROM WNL's and pain free  -CP     LTG 2 able to perform plow trhough full ROM without pain  -CP     LTG 3 able to hold prone plank 30\"  -CP     LTG 4 able to hold side plank 30\"  -CP     LTG 5 reports pain at 2/10 or less for a week  -CP     LTG 6 able to  items from floor without difficulty or pain  -CP        Time Calculation    PT Goal Re-Cert Due Date 12/09/22  -CP           User Key  (r) = Recorded By, (t) " = Taken By, (c) = Cosigned By    Initials Name Provider Type    CP Addis Norwood PTA Physical Therapist Assistant                Therapy Education  Education Details: patient to use a lumbar roll when sitting, transfer training for supine to sit, LTR, bridging, supine TA with hip AD squeezes  Given: HEP  Program: New  How Provided: Verbal, Demonstration  Provided to: Patient  Level of Understanding: Teach back education performed, Verbalized, Demonstrated              Time Calculation:   Start Time: 1015  Stop Time: 1050  Time Calculation (min): 35 min  Total Timed Code Minutes- PT: 20 minute(s)  Timed Charges  93657 - PT Therapeutic Exercise Minutes: 5  21485 - PT Manual Therapy Minutes: 15  Untimed Charges  PT Ice Rx Minutes: 15  Total Minutes  Timed Charges Total Minutes: 20  Untimed Charges Total Minutes: 15   Total Minutes: 20  Therapy Charges for Today     Code Description Service Date Service Provider Modifiers Qty    66935134488 HC PT THER SUPP EA 15 MIN 11/23/2022 Addis Norwood PTA GP 1    55407092050 HC PT MANUAL THERAPY EA 15 MIN 11/23/2022 Addis Norwood PTA GP, CQ 1                    Addis Norwood PTA  11/23/2022

## 2022-11-29 ENCOUNTER — APPOINTMENT (OUTPATIENT)
Dept: PHYSICAL THERAPY | Facility: HOSPITAL | Age: 27
End: 2022-11-29

## 2022-11-30 ENCOUNTER — OFFICE VISIT (OUTPATIENT)
Dept: FAMILY MEDICINE CLINIC | Facility: CLINIC | Age: 27
End: 2022-11-30

## 2022-11-30 ENCOUNTER — LAB (OUTPATIENT)
Dept: LAB | Facility: HOSPITAL | Age: 27
End: 2022-11-30

## 2022-11-30 VITALS
BODY MASS INDEX: 24.73 KG/M2 | HEART RATE: 70 BPM | OXYGEN SATURATION: 98 % | HEIGHT: 69 IN | SYSTOLIC BLOOD PRESSURE: 110 MMHG | WEIGHT: 167 LBS | DIASTOLIC BLOOD PRESSURE: 70 MMHG

## 2022-11-30 DIAGNOSIS — N39.0 URINARY TRACT INFECTION WITHOUT HEMATURIA, SITE UNSPECIFIED: Primary | ICD-10-CM

## 2022-11-30 LAB
BILIRUB BLD-MCNC: NEGATIVE MG/DL
CLARITY, POC: CLEAR
COLOR UR: YELLOW
GLUCOSE UR STRIP-MCNC: NEGATIVE MG/DL
KETONES UR QL: NEGATIVE
LEUKOCYTE EST, POC: ABNORMAL
NITRITE UR-MCNC: NEGATIVE MG/ML
PH UR: 7 [PH] (ref 5–8)
PROT UR STRIP-MCNC: NEGATIVE MG/DL
RBC # UR STRIP: NEGATIVE /UL
SP GR UR: 1.02 (ref 1–1.03)
UROBILINOGEN UR QL: NORMAL

## 2022-11-30 PROCEDURE — 87086 URINE CULTURE/COLONY COUNT: CPT | Performed by: NURSE PRACTITIONER

## 2022-11-30 PROCEDURE — 81002 URINALYSIS NONAUTO W/O SCOPE: CPT | Performed by: NURSE PRACTITIONER

## 2022-11-30 PROCEDURE — 99213 OFFICE O/P EST LOW 20 MIN: CPT | Performed by: NURSE PRACTITIONER

## 2022-11-30 RX ORDER — SULFAMETHOXAZOLE AND TRIMETHOPRIM 800; 160 MG/1; MG/1
1 TABLET ORAL 2 TIMES DAILY
Qty: 10 TABLET | Refills: 0 | OUTPATIENT
Start: 2022-11-30 | End: 2023-02-04

## 2022-11-30 RX ORDER — FLUCONAZOLE 150 MG/1
150 TABLET ORAL DAILY
Qty: 2 TABLET | Refills: 0 | Status: SHIPPED | OUTPATIENT
Start: 2022-11-30 | End: 2023-02-01

## 2022-11-30 NOTE — PROGRESS NOTES
"    Chief Complaint   Patient presents with   • Urinary Tract Infection     Subjective   Nirmala Tamayo is a 27 y.o. female.           History of Present Illness     The following portions of the patient's history were reviewed and updated as appropriate: allergies, current medications, past social history and problem list.    Review of Systems    Objective   /70   Pulse 70   Ht 175.3 cm (69\")   Wt 75.8 kg (167 lb)   SpO2 98%   BMI 24.66 kg/m²   Physical Exam         Assessment & Plan     Problems Addressed this Visit    None  Visit Diagnoses     Urinary tract infection without hematuria, site unspecified    -  Primary      Diagnoses       Codes Comments    Urinary tract infection without hematuria, site unspecified    -  Primary ICD-10-CM: N39.0  ICD-9-CM: 599.0          No orders of the defined types were placed in this encounter.    Current Outpatient Medications on File Prior to Visit   Medication Sig Dispense Refill   • acetaminophen-codeine (TYLENOL #3) 300-30 MG per tablet Take 1 tablet by mouth Every 4 (Four) Hours As Needed for Moderate Pain. 20 tablet 0   • albuterol sulfate  (90 Base) MCG/ACT inhaler Inhale 2 puffs Every 4 (Four) Hours As Needed for Wheezing. 18 g 11   • baclofen (LIORESAL) 10 MG tablet Take 1 tablet by mouth 2 (Two) Times a Day. 60 tablet 5   • beclomethasone (Qvar) 40 MCG/ACT inhaler Inhale 2 puffs 2 (Two) Times a Day. 8.6 g 11   • cetirizine (zyrTEC) 10 MG tablet Take 1 tablet by mouth Daily. 30 tablet 0   • clonazePAM (KlonoPIN) 0.5 MG tablet Take 1 tablet by mouth 2 (Two) Times a Day As Needed for Anxiety. 60 tablet 0   • EPINEPHrine (EpiPen 2-Augusto) 0.3 MG/0.3ML solution auto-injector injection Inject once for allergic reaction as instructed and proceed to the nearest Emergency Room for further treatment 2 each 1   • meloxicam (Mobic) 15 MG tablet Take 1 tablet by mouth Daily. 30 tablet 5   • meloxicam (MOBIC) 15 MG tablet Take 1 tablet by mouth Daily. 5 " tablet 0   • ondansetron (Zofran) 8 MG tablet Take 1 tablet by mouth Every 8 (Eight) Hours As Needed for Nausea or Vomiting. 20 tablet 6   • ondansetron ODT (Zofran ODT) 4 MG disintegrating tablet Place 1 tablet on the tongue Every 8 (Eight) Hours As Needed for Nausea or Vomiting. 15 tablet 0   • promethazine-dextromethorphan (PROMETHAZINE-DM) 6.25-15 MG/5ML syrup Take 5 mL by mouth Every 6 (Six) Hours As Needed for Cough. 120 mL 0   • tiZANidine (Zanaflex) 4 MG tablet Take 1 tablet by mouth At Night As Needed for Muscle Spasms. 12 tablet 0   • ciprofloxacin (Cipro) 500 MG tablet Take 1 tablet by mouth 2 (Two) Times a Day. 14 tablet 0   • [DISCONTINUED] montelukast (Singulair) 10 MG tablet Take 1 tablet by mouth Every Night. 30 tablet 11     No current facility-administered medications on file prior to visit.        Follow Up   No follow-ups on file.

## 2022-11-30 NOTE — PROGRESS NOTES
"  Chief Complaint   Patient presents with   • Urinary Tract Infection     Subjective   Nirmala Tamayo is a 27 y.o. female.           Urinary Tract Infection   This is a new problem. The current episode started in the past 7 days. The quality of the pain is described as burning. The pain is at a severity of 2/10. The pain is mild. She is not sexually active. There is no history of pyelonephritis. Associated symptoms include frequency, hesitancy and urgency. Pertinent negatives include no chills, discharge, possible pregnancy, sweats or vomiting. The treatment provided mild relief. Her past medical history is significant for recurrent UTIs. There is no history of catheterization, kidney stones, urinary stasis or a urological procedure.        The following portions of the patient's history were reviewed and updated as appropriate: allergies, current medications, past social history and problem list.    Review of Systems   Constitutional: Negative for activity change, appetite change and chills.   Eyes: Negative.    Respiratory: Negative.    Cardiovascular: Negative.    Gastrointestinal: Negative for vomiting.   Endocrine: Negative.    Genitourinary: Positive for frequency, hesitancy and urgency.   Musculoskeletal: Negative.    Allergic/Immunologic: Negative.    Hematological: Negative.    Psychiatric/Behavioral: Negative.        Objective   /70   Pulse 70   Ht 175.3 cm (69\")   Wt 75.8 kg (167 lb)   SpO2 98%   BMI 24.66 kg/m²   Physical Exam  Vitals and nursing note reviewed.   Constitutional:       Appearance: Normal appearance.   HENT:      Head: Normocephalic.      Right Ear: Tympanic membrane normal.      Nose: Nose normal.      Mouth/Throat:      Mouth: Mucous membranes are moist.   Eyes:      Extraocular Movements: Extraocular movements intact.      Pupils: Pupils are equal, round, and reactive to light.   Cardiovascular:      Rate and Rhythm: Normal rate.      Pulses: Normal pulses.      " Heart sounds: Normal heart sounds. No murmur heard.    No friction rub. No gallop.   Pulmonary:      Effort: Pulmonary effort is normal.      Breath sounds: Normal breath sounds.   Abdominal:      General: Abdomen is flat.      Palpations: Abdomen is soft.   Musculoskeletal:         General: Tenderness present. No swelling, deformity or signs of injury.      Cervical back: Normal range of motion. No swelling, edema, deformity, erythema, lacerations, spasms, tenderness or bony tenderness.      Thoracic back: Spasms and tenderness present.        Back:       Right lower leg: No edema.      Left lower leg: No edema.      Comments: Mid spasm pain    Skin:     General: Skin is warm.      Coloration: Skin is not jaundiced or pale.      Findings: No bruising or erythema.   Neurological:      General: No focal deficit present.      Mental Status: She is alert and oriented to person, place, and time.      Cranial Nerves: No cranial nerve deficit.      Sensory: No sensory deficit.      Motor: No weakness.      Coordination: Coordination normal.   Psychiatric:         Mood and Affect: Mood normal.         Behavior: Behavior normal.              Assessment & Plan     Problems Addressed this Visit    None  Visit Diagnoses     Urinary tract infection without hematuria, site unspecified    -  Primary    Relevant Orders    Urine Culture - Urine, Urine, Clean Catch      Diagnoses       Codes Comments    Urinary tract infection without hematuria, site unspecified    -  Primary ICD-10-CM: N39.0  ICD-9-CM: 599.0            New Medications Ordered This Visit   Medications   • sulfamethoxazole-trimethoprim (Bactrim DS) 800-160 MG per tablet     Sig: Take 1 tablet by mouth 2 (Two) Times a Day.     Dispense:  10 tablet     Refill:  0   • fluconazole (Diflucan) 150 MG tablet     Sig: Take 1 tablet by mouth Daily.     Dispense:  2 tablet     Refill:  0     Current Outpatient Medications on File Prior to Visit   Medication Sig Dispense Refill    • acetaminophen-codeine (TYLENOL #3) 300-30 MG per tablet Take 1 tablet by mouth Every 4 (Four) Hours As Needed for Moderate Pain. 20 tablet 0   • albuterol sulfate  (90 Base) MCG/ACT inhaler Inhale 2 puffs Every 4 (Four) Hours As Needed for Wheezing. 18 g 11   • baclofen (LIORESAL) 10 MG tablet Take 1 tablet by mouth 2 (Two) Times a Day. 60 tablet 5   • beclomethasone (Qvar) 40 MCG/ACT inhaler Inhale 2 puffs 2 (Two) Times a Day. 8.6 g 11   • cetirizine (zyrTEC) 10 MG tablet Take 1 tablet by mouth Daily. 30 tablet 0   • clonazePAM (KlonoPIN) 0.5 MG tablet Take 1 tablet by mouth 2 (Two) Times a Day As Needed for Anxiety. 60 tablet 0   • EPINEPHrine (EpiPen 2-Augusto) 0.3 MG/0.3ML solution auto-injector injection Inject once for allergic reaction as instructed and proceed to the nearest Emergency Room for further treatment 2 each 1   • meloxicam (Mobic) 15 MG tablet Take 1 tablet by mouth Daily. 30 tablet 5   • meloxicam (MOBIC) 15 MG tablet Take 1 tablet by mouth Daily. 5 tablet 0   • ondansetron (Zofran) 8 MG tablet Take 1 tablet by mouth Every 8 (Eight) Hours As Needed for Nausea or Vomiting. 20 tablet 6   • ondansetron ODT (Zofran ODT) 4 MG disintegrating tablet Place 1 tablet on the tongue Every 8 (Eight) Hours As Needed for Nausea or Vomiting. 15 tablet 0   • promethazine-dextromethorphan (PROMETHAZINE-DM) 6.25-15 MG/5ML syrup Take 5 mL by mouth Every 6 (Six) Hours As Needed for Cough. 120 mL 0   • tiZANidine (Zanaflex) 4 MG tablet Take 1 tablet by mouth At Night As Needed for Muscle Spasms. 12 tablet 0   • [DISCONTINUED] ciprofloxacin (Cipro) 500 MG tablet Take 1 tablet by mouth 2 (Two) Times a Day. 14 tablet 0   • [DISCONTINUED] montelukast (Singulair) 10 MG tablet Take 1 tablet by mouth Every Night. 30 tablet 11     No current facility-administered medications on file prior to visit.       15 minutes   Follow Up   No follow-ups on file.     Follow up from previous UTI. Treated with Cipro  Still positive  for UTI. Started 5 day course of Bactrim BID  Diflucan ordered daily PRN x2 days     Patient denies foul smelling urine, burning or itching.   Educated on maintaining water intake      Recovering from flu. S/s improving.      Follow up as needed       Urine culture as directed, will notify   meds as directed, diet discussed

## 2022-12-01 LAB — BACTERIA SPEC AEROBE CULT: NO GROWTH

## 2022-12-02 ENCOUNTER — TELEPHONE (OUTPATIENT)
Dept: FAMILY MEDICINE CLINIC | Facility: CLINIC | Age: 27
End: 2022-12-02

## 2022-12-02 ENCOUNTER — HOSPITAL ENCOUNTER (OUTPATIENT)
Dept: PHYSICAL THERAPY | Facility: HOSPITAL | Age: 27
Setting detail: THERAPIES SERIES
Discharge: HOME | End: 2022-12-02
Payer: COMMERCIAL

## 2022-12-02 DIAGNOSIS — M54.31 RIGHT SIDED SCIATICA: Primary | ICD-10-CM

## 2022-12-02 PROCEDURE — 97110 THERAPEUTIC EXERCISES: CPT

## 2022-12-02 NOTE — TELEPHONE ENCOUNTER
Per JOSE CARLOS Yarbrough, Ms. Tamayo  has been called with recent lab results & recommendations.  Continue current medications and follow-up as planned or sooner if any problems.       ----- Message from JOSE CARLOS Skaggs sent at 12/1/2022  1:29 PM CST -----  Can you let her know no changes needed based on culture   ----- Message -----  From: Carmen Perry MA  Sent: 11/30/2022  10:00 AM CST  To: JOSE CARLOS Skaggs

## 2022-12-02 NOTE — THERAPY TREATMENT NOTE
Outpatient Physical Therapy Ortho Treatment Note  [facility]     Patient Name: Nirmala Tamayo  : 1995  MRN: [id number]  Today's Date: 2022      Visit Date: 2022  Attendance: 3/5  Subjective improvement: n/a  Recert: 2022  MD Appointment: TBD    Visit Dx:    ICD-10-CM ICD-9-CM   1. Right sided sciatica  M54.31 724.3       Patient Active Problem List   Diagnosis   • Sore throat   • Anxiety   • Low back pain with radiation   • Lumbar disc disorder   • Irritable bowel syndrome with constipation   • Epigastric pain   • Acute upper respiratory infection   • Left otitis media   • Cough   • Abnormal EKG   • Palpitations   • Lower abdominal pain   • Blood in stool        Past Medical History:   Diagnosis Date   • Abnormal EKG 2021   • Abnormal Pap smear of cervix    • Acute allergic reaction    • Acute bronchitis    • Acute pharyngitis    • Agoraphobia with panic attacks    • Allergic rhinitis    • Anemia    • Anxiety    • Anxiety state    • Asthma     Stable   • Back strain    • Constipation    • Cough    • Disturbance of skin sensation    • Gestational hypertension     with second pregnancy   • Headache    • History of transfusion     after second delivery   • HPV (human papilloma virus) infection    • Hx of preeclampsia, prior pregnancy, currently pregnant 2018   • Irregular periods    • Irritable bowel syndrome with constipation    • Low back pain    • Lumbosacral dysfunction    • Neck pain    • Palpitations    • Preeclampsia    • Rh negative state in antepartum period, third trimester 2017   • Rhinitis    • Severe depression (HCC)    • Spasm     cervical spasm   • Spinal headache    • Upper respiratory infection    • Urinary tract infection 2017   • Vaginal irritation    • Vitreous floaters     prob, not seen on exam   • Wheezing         Past Surgical History:   Procedure Laterality Date   • COLONOSCOPY N/A 3/7/2022    Procedure: COLONOSCOPY;  Surgeon:  Sloan Sterling MD;  Location: [facility] ENDOSCOPY;  Service: Gastroenterology;  Laterality: N/A;   • PROCEDURE GENERIC CONVERTED  02/01/2016    Physical Therapy Consult   • WISDOM TOOTH EXTRACTION          PT Ortho     Row Name 12/02/22 0900       Posture/Observations    Posture/Observations Comments Pelvic alignment; equal leg length and pelvic alignment.  -EM          User Key  (r) = Recorded By, (t) = Taken By, (c) = Cosigned By    Initials Name Provider Type    Babatunde Hope PTA Physical Therapist Assistant                             PT Assessment/Plan     Row Name 12/02/22 1000          PT Assessment    Assessment Comments Pt steve tx well today with added prone on hands exercise. Pt initially felt a pinch in her SI joint during these, but eased off some and symptoms went away. Decrease in pain rating s/p tx today. Enforced log rolling today.  -EM        PT Plan    PT Frequency 2x/week  -EM     Predicted Duration of Therapy Intervention (PT) 6 weeks  -EM     PT Plan Comments Cont with POC. Add clamshells next visit.  -EM           User Key  (r) = Recorded By, (t) = Taken By, (c) = Cosigned By    Initials Name Provider Type    Babatunde Hope PTA Physical Therapist Assistant                 Modalities     Row Name 12/02/22 1100             Ice    Ice S/P Rx Yes  -EM      Patient denies application of Ice Yes  -EM      Patient reports will apply ice at home to involved area Yes  -EM            User Key  (r) = Recorded By, (t) = Taken By, (c) = Cosigned By    Initials Name Provider Type    Babatunde Hope PTA Physical Therapist Assistant               OP Exercises     Row Name 12/02/22 0900             Subjective Comments    Subjective Comments Pt reports she is still having SI pain. She reports that sitting worsens her pain, and standing and walking improves her pain. Pt reports that after standing and walking long distances at a concert recently, her symptoms flared up.  -EM         Subjective Pain     "Able to rate subjective pain? yes  -EM      Pre-Treatment Pain Level 2  -EM      Post-Treatment Pain Level 0  -EM         Exercise 1    Exercise Name 1 LTR stretch  -EM      Sets 1 1  -EM      Reps 1 20  -EM         Exercise 2    Exercise Name 2 Supine bridging  -EM      Reps 2 20  -EM         Exercise 3    Exercise Name 3 Log rolling  -EM      Cueing 3 Verbal;Demo  -EM         Exercise 4    Exercise Name 4 B Standing QL S  -EM      Sets 4 1  -EM      Reps 4 20  -EM         Exercise 5    Exercise Name 5 Prone on elbows  -EM      Additional Comments Tolerated well; proressed to POH  -EM         Exercise 6    Exercise Name 6 POH  -EM      Sets 6 3  -EM      Time 6 30\"  -EM         Exercise 7    Exercise Name 7 Supine TA with hip ADD squeezes  -EM      Sets 7 2  -EM      Reps 7 10  -EM      Time 7 5\" holds  -EM      Additional Comments Towel roll between knees.  -EM         Exercise 8    Exercise Name 8 Quadruped Bird Dogs  -EM      Sets 8 1  -EM      Reps 8 10  -EM         Exercise 9    Exercise Name 9 Pelvic alignment  -EM         Exercise 10    Exercise Name 10 Pt sent home with ice  -EM            User Key  (r) = Recorded By, (t) = Taken By, (c) = Cosigned By    Initials Name Provider Type    EM Babatunde López, [employer] Physical Therapist Assistant                         Manual Rx (last 36 hours)     Manual Treatments     Row Name 12/02/22 1100             Manual Rx 1    Manual Rx 1 Location Pelvic alignment assessed; pelvis and leg length equal.  -EM            User Key  (r) = Recorded By, (t) = Taken By, (c) = Cosigned By    Initials Name Provider Type    EM Babatunde López PTA Physical Therapist Assistant                 PT OP Goals     Row Name 12/02/22 1100          PT Short Term Goals    STG Date to Achieve 12/09/22  -EM     STG 1 independent and compliantwith hep  -EM     STG 2 understand and demonstrate good mechanics  -EM        Long Term Goals    LTG Date to Achieve 12/30/22  -EM     LTG 1 trunk AROM WNL's " "and pain free  -EM     LTG 2 able to perform plow trhough full ROM without pain  -EM     LTG 3 able to hold prone plank 30\"  -EM     LTG 4 able to hold side plank 30\"  -EM     LTG 5 reports pain at 2/10 or less for a week  -EM     LTG 6 able to  items from floor without difficulty or pain  -EM        Time Calculation    PT Goal Re-Cert Due Date 12/09/22  -EM           User Key  (r) = Recorded By, (t) = Taken By, (c) = Cosigned By    Initials Name Provider Type    EM Babatunde López PTA Physical Therapist Assistant                               Time Calculation:   Start Time: 0938  Stop Time: 1024  Time Calculation (min): 46 min  Total Timed Code Minutes- PT: 46 minute(s)  Therapy Charges for Today     Code Description Service Date Service Provider Modifiers Qty    57608779281 HC PT THER PROC EA 15 MIN 12/2/2022 Babatunde López PTA GP, CQ 3                    Babatunde López PTA  12/2/2022     "

## 2022-12-09 ENCOUNTER — APPOINTMENT (OUTPATIENT)
Dept: PHYSICAL THERAPY | Facility: HOSPITAL | Age: 27
End: 2022-12-09
Payer: COMMERCIAL

## 2022-12-13 ENCOUNTER — HOSPITAL ENCOUNTER (OUTPATIENT)
Dept: PHYSICAL THERAPY | Facility: HOSPITAL | Age: 27
Setting detail: THERAPIES SERIES
Discharge: HOME | End: 2022-12-13
Payer: COMMERCIAL

## 2022-12-13 DIAGNOSIS — M54.31 RIGHT SIDED SCIATICA: Primary | ICD-10-CM

## 2022-12-13 PROCEDURE — 97110 THERAPEUTIC EXERCISES: CPT

## 2022-12-13 NOTE — THERAPY TREATMENT NOTE
"    Outpatient Physical Therapy Ortho Treatment Note  [facility]     Patient Name: Nirmala Tamayo  : 1995  MRN: [id number]  Today's Date: 2022      Visit Date: 2022   Attendance:    Subjective improvement:\"Better\"  Recert:22  MD Appointment: TBD      Visit Dx:    ICD-10-CM ICD-9-CM   1. Right sided sciatica  M54.31 724.3       Patient Active Problem List   Diagnosis   • Sore throat   • Anxiety   • Low back pain with radiation   • Lumbar disc disorder   • Irritable bowel syndrome with constipation   • Epigastric pain   • Acute upper respiratory infection   • Left otitis media   • Cough   • Abnormal EKG   • Palpitations   • Lower abdominal pain   • Blood in stool        Past Medical History:   Diagnosis Date   • Abnormal EKG 2021   • Abnormal Pap smear of cervix    • Acute allergic reaction    • Acute bronchitis    • Acute pharyngitis    • Agoraphobia with panic attacks    • Allergic rhinitis    • Anemia    • Anxiety    • Anxiety state    • Asthma     Stable   • Back strain    • Constipation    • Cough    • Disturbance of skin sensation    • Gestational hypertension     with second pregnancy   • Headache    • History of transfusion     after second delivery   • HPV (human papilloma virus) infection    • Hx of preeclampsia, prior pregnancy, currently pregnant 2018   • Irregular periods    • Irritable bowel syndrome with constipation    • Low back pain    • Lumbosacral dysfunction    • Neck pain    • Palpitations    • Preeclampsia    • Rh negative state in antepartum period, third trimester 2017   • Rhinitis    • Severe depression (HCC)    • Spasm     cervical spasm   • Spinal headache    • Upper respiratory infection    • Urinary tract infection 2017   • Vaginal irritation    • Vitreous floaters     prob, not seen on exam   • Wheezing         Past Surgical History:   Procedure Laterality Date   • COLONOSCOPY N/A 3/7/2022    Procedure: COLONOSCOPY;  " "Surgeon: Sloan Sterling MD;  Location: [facility] ENDOSCOPY;  Service: Gastroenterology;  Laterality: N/A;   • PROCEDURE GENERIC CONVERTED  02/01/2016    Physical Therapy Consult   • WISDOM TOOTH EXTRACTION          PT Ortho     Row Name 12/13/22 0800       Posture/Observations    Posture/Observations Comments Equal pelvic alignment. No TTP R paraspinals, QL, Piriformis  -EM          User Key  (r) = Recorded By, (t) = Taken By, (c) = Cosigned By    Initials Name Provider Type    Babatunde Hope, [facility] Physical Therapist Assistant                             PT Assessment/Plan     Row Name 12/13/22 0900          PT Assessment    Assessment Comments Pt steve tx well with progressed therex. Difficulty noted with planks. No reactivitiy noted this date and no TTP noted in musculature in L-Spine. Pelvis Neutral  -EM        PT Plan    PT Frequency 2x/week  -EM     Predicted Duration of Therapy Intervention (PT) 6 weeks  -EM     PT Plan Comments Add Quadruped activities and pball activities  -EM           User Key  (r) = Recorded By, (t) = Taken By, (c) = Cosigned By    Initials Name Provider Type    Babatunde Hope, [facility] Physical Therapist Assistant                   OP Exercises     Row Name 12/13/22 0800             Subjective Comments    Subjective Comments Pt reports she been doing well, but worked out yesterday and flared her back up some.-likely secondary to sit ups.  -EM         Subjective Pain    Able to rate subjective pain? yes  -EM      Pre-Treatment Pain Level 4  -EM      Post-Treatment Pain Level 0  -EM         Exercise 1    Exercise Name 1 Arc Trainer  -EM      Time 1 10'  -EM         Exercise 2    Exercise Name 2 B Seated Piriformis S  -EM      Sets 2 3  -EM      Time 2 30\"  -EM         Exercise 3    Exercise Name 3 POH  -EM      Sets 3 1  -EM      Reps 3 10  -EM      Time 3 10\" hold  -EM         Exercise 4    Exercise Name 4 Bridges with Alt SLR  -EM      Sets 4 1  -EM      Reps 4 20  -EM         Exercise 5    " "Exercise Name 5 B SL Clamshells with TB  -EM      Sets 5 1  -EM      Reps 5 20  -EM      Additional Comments GTB  -EM         Exercise 6    Exercise Name 6 Prone Planks  -EM      Sets 6 2  -EM      Reps 6 5  -EM      Time 6 10\"  -EM            User Key  (r) = Recorded By, (t) = Taken By, (c) = Cosigned By    Initials Name Provider Type    EM Babatunde López, PTA Physical Therapist Assistant                              PT OP Goals     Row Name 12/13/22 0900          PT Short Term Goals    STG Date to Achieve 12/09/22  -EM     STG 1 independent and compliantwith hep  -EM     STG 1 Progress Not Met  -EM     STG 2 understand and demonstrate good mechanics  -EM     STG 2 Progress Not Met  -EM        Long Term Goals    LTG Date to Achieve 12/30/22  -EM     LTG 1 trunk AROM WNL's and pain free  -EM     LTG 1 Progress Not Met  -EM     LTG 2 able to perform plow trhough full ROM without pain  -EM     LTG 2 Progress Not Met  -EM     LTG 3 able to hold prone plank 30\"  -EM     LTG 3 Progress Not Met  -EM     LTG 4 able to hold side plank 30\"  -EM     LTG 4 Progress Not Met  -EM     LTG 5 reports pain at 2/10 or less for a week  -EM     LTG 5 Progress Not Met  -EM     LTG 6 able to  items from floor without difficulty or pain  -EM     LTG 6 Progress Not Met  -EM        Time Calculation    PT Goal Re-Cert Due Date 12/09/22  -EM           User Key  (r) = Recorded By, (t) = Taken By, (c) = Cosigned By    Initials Name Provider Type    EM Babatunde López, PTA Physical Therapist Assistant                Therapy Education  Education Details: Updated HEP Issued: Piriformis S, Quadruped Firehydrant, Quadruped Bird Dog, Prone Planks, SL Clamshells with TB, Crunches, POH  Given: HEP  Program: Progressed  How Provided: Verbal, Demonstration, Written  Provided to: Patient  Level of Understanding: Verbalized, Demonstrated              Time Calculation:   Start Time: 0850  Stop Time: 0938  Time Calculation (min): 48 min  Total Timed " Code Minutes- PT: 48 minute(s)  Therapy Charges for Today     Code Description Service Date Service Provider Modifiers Qty    46218143758 HC PT THER PROC EA 15 MIN 12/13/2022 Babatunde López, PTA GP, CQ 3                    Babatunde López, [facility]  12/13/2022

## 2022-12-19 RX ORDER — ONDANSETRON 4 MG/1
4 TABLET, ORALLY DISINTEGRATING ORAL EVERY 8 HOURS PRN
Qty: 15 TABLET | Refills: 0 | Status: SHIPPED | OUTPATIENT
Start: 2022-12-19 | End: 2023-02-01

## 2022-12-22 ENCOUNTER — APPOINTMENT (OUTPATIENT)
Dept: PHYSICAL THERAPY | Facility: HOSPITAL | Age: 27
End: 2022-12-22
Payer: COMMERCIAL

## 2022-12-28 ENCOUNTER — APPOINTMENT (OUTPATIENT)
Dept: PHYSICAL THERAPY | Facility: HOSPITAL | Age: 27
End: 2022-12-28
Payer: COMMERCIAL

## 2023-02-01 ENCOUNTER — OFFICE VISIT (OUTPATIENT)
Dept: FAMILY MEDICINE CLINIC | Facility: CLINIC | Age: 28
End: 2023-02-01
Payer: COMMERCIAL

## 2023-02-01 VITALS
BODY MASS INDEX: 24.88 KG/M2 | HEIGHT: 69 IN | SYSTOLIC BLOOD PRESSURE: 112 MMHG | HEART RATE: 67 BPM | OXYGEN SATURATION: 97 % | WEIGHT: 168 LBS | DIASTOLIC BLOOD PRESSURE: 78 MMHG

## 2023-02-01 DIAGNOSIS — M54.9 MID BACK PAIN: Primary | ICD-10-CM

## 2023-02-01 PROCEDURE — 99214 OFFICE O/P EST MOD 30 MIN: CPT | Performed by: NURSE PRACTITIONER

## 2023-02-01 RX ORDER — BUPROPION HYDROCHLORIDE 75 MG/1
75 TABLET ORAL DAILY
Qty: 90 TABLET | Refills: 3 | Status: SHIPPED | OUTPATIENT
Start: 2023-02-01

## 2023-02-01 RX ORDER — MELOXICAM 7.5 MG/1
7.5 TABLET ORAL DAILY
Qty: 30 TABLET | Refills: 11 | Status: SHIPPED | OUTPATIENT
Start: 2023-02-01 | End: 2023-03-08

## 2023-02-01 RX ORDER — BACLOFEN 10 MG/1
10 TABLET ORAL 2 TIMES DAILY
Qty: 60 TABLET | Refills: 11 | Status: SHIPPED | OUTPATIENT
Start: 2023-02-01 | End: 2023-03-08

## 2023-02-01 NOTE — PROGRESS NOTES
"  Chief Complaint   Patient presents with   • Back Pain     Upper back since last Thru and mainly at night     Subjective   Nirmala Tamayo is a 27 y.o. female.           Back Pain  This is a new problem. The problem has been waxing and waning since onset. The quality of the pain is described as shooting. The pain is at a severity of 2/10. The pain is mild. Stiffness is present all day. Associated symptoms include paresis. Pertinent negatives include no bladder incontinence, bowel incontinence, chest pain or numbness. She has tried analgesics, ice and NSAIDs for the symptoms. The treatment provided mild relief.   Anxiety  Presents for follow-up visit. Symptoms include decreased concentration, excessive worry, insomnia, nervous/anxious behavior and panic. Patient reports no chest pain. Symptoms occur occasionally.     Compliance with medications is %.        The following portions of the patient's history were reviewed and updated as appropriate: allergies, current medications, past social history and problem list.    Review of Systems   Respiratory: Negative.    Cardiovascular: Negative for chest pain.   Gastrointestinal: Negative for bowel incontinence.   Endocrine: Negative.    Genitourinary: Negative for bladder incontinence.   Musculoskeletal: Positive for arthralgias, back pain, joint swelling and myalgias.   Neurological: Negative for syncope and numbness.   Hematological: Negative.    Psychiatric/Behavioral: Positive for decreased concentration and sleep disturbance. The patient is nervous/anxious and has insomnia.         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   HENT:      Head: Normocephalic.      Nose: Nose normal.      Mouth/Throat:      Mouth: Mucous membranes are moist.   Eyes:      Pupils: Pupils are equal, round, and " reactive to light.   Cardiovascular:      Rate and Rhythm: Normal rate.      Pulses: Normal pulses.   Abdominal:      General: Abdomen is flat.   Musculoskeletal:         General: Tenderness present.      Cervical back: Normal range of motion.      Thoracic back: Spasms and tenderness present. No swelling, edema, deformity, signs of trauma or bony tenderness. Normal range of motion. No scoliosis.        Back:       Comments: Mid back pain with spasm -mid thoracic    Neurological:      Mental Status: She is alert.   Psychiatric:         Mood and Affect: Mood normal.              Assessment & Plan     Problems Addressed this Visit    None  Visit Diagnoses     Mid back pain    -  Primary    Relevant Orders    XR Spine Thoracic 2 View (In Office)    Ambulatory Referral to Physical Therapy Evaluate and treat (Completed)      Diagnoses       Codes Comments    Mid back pain    -  Primary ICD-10-CM: M54.9  ICD-9-CM: 724.5            New Medications Ordered This Visit   Medications   • meloxicam (Mobic) 7.5 MG tablet     Sig: Take 1 tablet by mouth Daily.     Dispense:  30 tablet     Refill:  11   • baclofen (LIORESAL) 10 MG tablet     Sig: Take 1 tablet by mouth 2 (Two) Times a Day.     Dispense:  60 tablet     Refill:  11   • buPROPion (WELLBUTRIN) 75 MG tablet     Sig: Take 1 tablet by mouth Daily.     Dispense:  90 tablet     Refill:  3     Current Outpatient Medications on File Prior to Visit   Medication Sig Dispense Refill   • acetaminophen-codeine (TYLENOL #3) 300-30 MG per tablet Take 1 tablet by mouth Every 4 (Four) Hours As Needed for Moderate Pain. 20 tablet 0   • albuterol sulfate  (90 Base) MCG/ACT inhaler Inhale 2 puffs Every 4 (Four) Hours As Needed for Wheezing. 18 g 11   • baclofen (LIORESAL) 10 MG tablet Take 1 tablet by mouth 2 (Two) Times a Day. 60 tablet 5   • beclomethasone (Qvar) 40 MCG/ACT inhaler Inhale 2 puffs 2 (Two) Times a Day. 8.6 g 11   • cetirizine (zyrTEC) 10 MG tablet Take 1 tablet  by mouth Daily. 30 tablet 0   • clonazePAM (KlonoPIN) 0.5 MG tablet Take 1 tablet by mouth 2 (Two) Times a Day As Needed for Anxiety. 60 tablet 0   • EPINEPHrine (EpiPen 2-Augusto) 0.3 MG/0.3ML solution auto-injector injection Inject once for allergic reaction as instructed and proceed to the nearest Emergency Room for further treatment 2 each 1   • meloxicam (Mobic) 15 MG tablet Take 1 tablet by mouth Daily. 30 tablet 5   • ondansetron (Zofran) 8 MG tablet Take 1 tablet by mouth Every 8 (Eight) Hours As Needed for Nausea or Vomiting. 20 tablet 6   • promethazine-dextromethorphan (PROMETHAZINE-DM) 6.25-15 MG/5ML syrup Take 5 mL by mouth Every 6 (Six) Hours As Needed for Cough. 120 mL 0   • sulfamethoxazole-trimethoprim (Bactrim DS) 800-160 MG per tablet Take 1 tablet by mouth 2 (Two) Times a Day. 10 tablet 0   • tiZANidine (Zanaflex) 4 MG tablet Take 1 tablet by mouth At Night As Needed for Muscle Spasms. 12 tablet 0   • [DISCONTINUED] fluconazole (Diflucan) 150 MG tablet Take 1 tablet by mouth Daily. 2 tablet 0   • [DISCONTINUED] meloxicam (MOBIC) 15 MG tablet Take 1 tablet by mouth Daily. 5 tablet 0   • [DISCONTINUED] montelukast (Singulair) 10 MG tablet Take 1 tablet by mouth Every Night. 30 tablet 11   • [DISCONTINUED] ondansetron ODT (Zofran ODT) 4 MG disintegrating tablet Place 1 tablet on the tongue Every 8 (Eight) Hours As Needed for Nausea or Vomiting. 15 tablet 0     No current facility-administered medications on file prior to visit.       18 minutes  Follow Up   No follow-ups on file.        Xray as directed, nsaids and muscle relaxer  Diet discussed  Follow up if worsen   Refer to pt as directed-patient agrees     Add wellbutrin for anxiety-has taken lexapro, pristiq in the past -will try wellbutrin and see if helps.

## 2023-02-13 ENCOUNTER — OFFICE VISIT (OUTPATIENT)
Dept: OBSTETRICS AND GYNECOLOGY | Facility: CLINIC | Age: 28
End: 2023-02-13
Payer: COMMERCIAL

## 2023-02-13 VITALS
WEIGHT: 166 LBS | SYSTOLIC BLOOD PRESSURE: 122 MMHG | HEIGHT: 69 IN | DIASTOLIC BLOOD PRESSURE: 74 MMHG | BODY MASS INDEX: 24.59 KG/M2

## 2023-02-13 DIAGNOSIS — Z01.419 ENCOUNTER FOR WELL WOMAN EXAM WITH ROUTINE GYNECOLOGICAL EXAM: Primary | ICD-10-CM

## 2023-02-13 PROCEDURE — 99395 PREV VISIT EST AGE 18-39: CPT | Performed by: NURSE PRACTITIONER

## 2023-02-13 NOTE — PROGRESS NOTES
Subjective   Nirmala Tamayo is a 27 y.o. Annual gynecological exam      History of Present Illness  LMP: 02/01/2023  Pap: NIL, 04/2021    Pt presents for annual gynecological exam with no complaints.      Gynecologic Exam  The patient's pertinent negatives include no genital itching, genital lesions, genital odor, genital rash, pelvic pain, vaginal bleeding or vaginal discharge. The patient is experiencing no pain. She is not pregnant. Pertinent negatives include no abdominal pain, chills, constipation, diarrhea, dysuria, fever, flank pain, frequency, headaches, hematuria, rash or urgency. She is sexually active. No, her partner does not have an STD. Her menstrual history has been regular.       The following portions of the patient's history were reviewed and updated as appropriate: allergies, current medications, past family history, past medical history, past social history, past surgical history and problem list.    Review of Systems   Constitutional: Negative for chills, diaphoresis, fatigue, fever and unexpected weight change.   Respiratory: Negative for apnea, chest tightness and shortness of breath.    Cardiovascular: Negative for chest pain and palpitations.   Gastrointestinal: Negative for abdominal distention, abdominal pain, constipation and diarrhea.   Genitourinary: Negative for decreased urine volume, difficulty urinating, dyspareunia, dysuria, enuresis, flank pain, frequency, genital sores, hematuria, menstrual problem, pelvic pain, urgency, vaginal bleeding, vaginal discharge and vaginal pain.   Skin: Negative for rash.   Neurological: Negative for headaches.   Psychiatric/Behavioral: Negative for sleep disturbance and suicidal ideas.         Objective   Physical Exam  Vitals and nursing note reviewed. Exam conducted with a chaperone present.   Constitutional:       General: She is awake. She is not in acute distress.     Appearance: Normal appearance. She is well-developed and  well-groomed. She is not ill-appearing, toxic-appearing or diaphoretic.   Neck:      Thyroid: No thyroid mass, thyromegaly or thyroid tenderness.   Cardiovascular:      Rate and Rhythm: Normal rate and regular rhythm.      Heart sounds: Normal heart sounds.   Pulmonary:      Effort: Pulmonary effort is normal.      Breath sounds: Normal breath sounds.   Chest:   Breasts:     Eddie Score is 5.      Breasts are symmetrical.      Right: Normal. No swelling, bleeding, inverted nipple, mass, nipple discharge, skin change or tenderness.      Left: Normal. No swelling, bleeding, inverted nipple, mass, nipple discharge, skin change or tenderness.   Abdominal:      General: Bowel sounds are normal. There is no distension.      Palpations: Abdomen is soft.      Tenderness: There is no abdominal tenderness.   Genitourinary:     General: Normal vulva.      Exam position: Lithotomy position.      Eddie stage (genital): 5.      Labia:         Right: No rash, tenderness, lesion or injury.         Left: No rash, tenderness, lesion or injury.       Urethra: No prolapse, urethral pain, urethral swelling or urethral lesion.      Vagina: Normal.      Cervix: Normal.      Uterus: Normal.       Adnexa: Right adnexa normal and left adnexa normal.        Right: No mass, tenderness or fullness.          Left: No mass, tenderness or fullness.        Comments: Pap smear obtained   Lymphadenopathy:      Upper Body:      Right upper body: No supraclavicular, axillary or pectoral adenopathy.      Left upper body: No supraclavicular, axillary or pectoral adenopathy.      Lower Body: No right inguinal adenopathy. No left inguinal adenopathy.   Skin:     General: Skin is warm and dry.   Neurological:      Mental Status: She is alert and oriented to person, place, and time.      Gait: Gait is intact.   Psychiatric:         Attention and Perception: Attention and perception normal.         Mood and Affect: Mood and affect normal.         Speech:  Speech normal.         Behavior: Behavior normal. Behavior is cooperative.           Assessment & Plan   Diagnoses and all orders for this visit:    1. Encounter for well woman exam with routine gynecological exam (Primary)  -     LIQUID-BASED PAP SMEAR, [facility] ([facility])      Patient educated and encouraged to do monthly self breast exam. If pap smear is normal patient will receive a letter in the mail in about two weeks.  If pap smear is abnormal we will call patient and follow up with plan.  Next pap due 3 years if normal today per ASCCP guidelines.

## 2023-02-15 LAB — REF LAB TEST METHOD: NORMAL

## 2023-03-03 RX ORDER — EPINEPHRINE 0.3 MG/.3ML
INJECTION SUBCUTANEOUS
Qty: 2 EACH | Refills: 1 | Status: SHIPPED | OUTPATIENT
Start: 2023-03-03

## 2023-04-27 ENCOUNTER — APPOINTMENT (OUTPATIENT)
Dept: LAB | Facility: HOSPITAL | Age: 28
End: 2023-04-27
Payer: COMMERCIAL

## 2023-04-27 ENCOUNTER — OFFICE VISIT (OUTPATIENT)
Dept: FAMILY MEDICINE CLINIC | Facility: CLINIC | Age: 28
End: 2023-04-27
Payer: COMMERCIAL

## 2023-04-27 VITALS
HEIGHT: 69 IN | OXYGEN SATURATION: 98 % | SYSTOLIC BLOOD PRESSURE: 120 MMHG | WEIGHT: 165 LBS | BODY MASS INDEX: 24.44 KG/M2 | HEART RATE: 81 BPM | DIASTOLIC BLOOD PRESSURE: 80 MMHG

## 2023-04-27 DIAGNOSIS — R53.81 MALAISE AND FATIGUE: ICD-10-CM

## 2023-04-27 DIAGNOSIS — K58.1 IRRITABLE BOWEL SYNDROME WITH CONSTIPATION: Primary | ICD-10-CM

## 2023-04-27 DIAGNOSIS — R53.83 MALAISE AND FATIGUE: ICD-10-CM

## 2023-04-27 PROCEDURE — 85025 COMPLETE CBC W/AUTO DIFF WBC: CPT | Performed by: NURSE PRACTITIONER

## 2023-04-27 PROCEDURE — 83540 ASSAY OF IRON: CPT | Performed by: NURSE PRACTITIONER

## 2023-04-27 PROCEDURE — 84443 ASSAY THYROID STIM HORMONE: CPT | Performed by: NURSE PRACTITIONER

## 2023-04-27 PROCEDURE — 36415 COLL VENOUS BLD VENIPUNCTURE: CPT | Performed by: NURSE PRACTITIONER

## 2023-04-27 PROCEDURE — 82607 VITAMIN B-12: CPT | Performed by: NURSE PRACTITIONER

## 2023-04-27 PROCEDURE — 99213 OFFICE O/P EST LOW 20 MIN: CPT | Performed by: NURSE PRACTITIONER

## 2023-04-27 PROCEDURE — 83036 HEMOGLOBIN GLYCOSYLATED A1C: CPT | Performed by: NURSE PRACTITIONER

## 2023-04-27 PROCEDURE — 80053 COMPREHEN METABOLIC PANEL: CPT | Performed by: NURSE PRACTITIONER

## 2023-04-27 PROCEDURE — 82306 VITAMIN D 25 HYDROXY: CPT | Performed by: NURSE PRACTITIONER

## 2023-04-27 PROCEDURE — 83735 ASSAY OF MAGNESIUM: CPT | Performed by: NURSE PRACTITIONER

## 2023-04-27 RX ORDER — PLECANATIDE 3 MG/1
1 TABLET ORAL DAILY
Qty: 30 TABLET | Refills: 11 | Status: SHIPPED | OUTPATIENT
Start: 2023-04-27

## 2023-04-27 NOTE — PROGRESS NOTES
Chief Complaint   Patient presents with   • GI Problem     constipation     Subjective   Nirmala Tamayo is a 28 y.o. female.           Constipation  This is a recurrent problem. The current episode started more than 1 month ago. The problem has been rapidly worsening since onset. The stool is described as formed. The patient is on a high fiber diet. She does not exercise regularly. There has not been adequate water intake. Associated symptoms include abdominal pain and bloating. Pertinent negatives include no anorexia, back pain, diarrhea, difficulty urinating, fecal incontinence, fever, flatus, hematochezia, hemorrhoids, melena, nausea, rectal pain, vomiting or weight loss. She has tried stool softeners for the symptoms. The treatment provided moderate relief. There is no history of abdominal surgery, endocrine disease, inflammatory bowel disease, irritable bowel syndrome, neurologic disease, neuromuscular disease, psychiatric history or radiation treatment.   Fatigue  This is a recurrent problem. The current episode started more than 1 month ago. The problem occurs intermittently. The problem has been gradually worsening. Associated symptoms include abdominal pain, fatigue and joint swelling. Pertinent negatives include no anorexia, arthralgias, chest pain, fever, nausea, neck pain, rash, sore throat, swollen glands, vomiting or weakness. The treatment provided mild relief.        The following portions of the patient's history were reviewed and updated as appropriate: allergies, current medications, past social history and problem list.    Review of Systems   Constitutional: Positive for fatigue. Negative for activity change, appetite change, fever and weight loss.   HENT: Negative for sore throat.    Respiratory: Negative.  Negative for wheezing and stridor.    Cardiovascular: Negative.  Negative for chest pain, palpitations and leg swelling.   Gastrointestinal: Positive for abdominal pain, bloating  "and constipation. Negative for abdominal distention, anal bleeding, anorexia, blood in stool, diarrhea, flatus, hematochezia, hemorrhoids, melena, nausea, rectal pain and vomiting.   Endocrine: Negative.  Negative for polydipsia, polyphagia and polyuria.   Genitourinary: Negative.  Negative for difficulty urinating.   Musculoskeletal: Positive for joint swelling. Negative for arthralgias, back pain, neck pain and neck stiffness.   Skin: Negative.  Negative for rash.   Allergic/Immunologic: Negative.  Negative for food allergies and immunocompromised state.   Neurological: Negative for tremors, seizures, syncope, speech difficulty and weakness.   Hematological: Negative.    Psychiatric/Behavioral: Negative for agitation, behavioral problems, confusion, decreased concentration, self-injury and sleep disturbance. The patient is not nervous/anxious.         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   Constitutional:       Appearance: Normal appearance.   HENT:      Head: Normocephalic.      Nose: Nose normal.      Mouth/Throat:      Mouth: Mucous membranes are moist.   Eyes:      Pupils: Pupils are equal, round, and reactive to light.   Neck:      Vascular: No carotid bruit.   Cardiovascular:      Rate and Rhythm: Normal rate and regular rhythm.      Pulses: Normal pulses.   Pulmonary:      Effort: Pulmonary effort is normal.   Abdominal:      General: Abdomen is flat.      Tenderness: There is abdominal tenderness in the right upper quadrant, right lower quadrant, epigastric area, periumbilical area, suprapubic area, left upper quadrant and left lower quadrant.          Comments: Colonoscopy this year -normal    Musculoskeletal:         General: No tenderness.      Cervical back: Normal range of motion. No rigidity or tenderness.      Thoracic back: " No swelling, edema, deformity, signs of trauma, lacerations, spasms or bony tenderness. Normal range of motion. No scoliosis.   Lymphadenopathy:      Cervical: No cervical adenopathy.   Skin:     General: Skin is warm.      Coloration: Skin is not jaundiced or pale.      Findings: No bruising or erythema.   Neurological:      General: No focal deficit present.      Mental Status: She is alert and oriented to person, place, and time.   Psychiatric:         Mood and Affect: Mood normal.              Assessment & Plan     Problems Addressed this Visit        Gastrointestinal Abdominal     Irritable bowel syndrome with constipation - Primary (Chronic)    Relevant Orders    CBC & Differential    Comprehensive Metabolic Panel    Iron    Hemoglobin A1c    Vitamin D,25-Hydroxy    Vitamin B12    TSH    Magnesium   Other Visit Diagnoses     Malaise and fatigue        Relevant Orders    CBC & Differential    Comprehensive Metabolic Panel    Iron    Hemoglobin A1c    Vitamin D,25-Hydroxy    Vitamin B12    TSH    Magnesium      Diagnoses       Codes Comments    Irritable bowel syndrome with constipation    -  Primary ICD-10-CM: K58.1  ICD-9-CM: 564.1     Malaise and fatigue     ICD-10-CM: R53.81, R53.83  ICD-9-CM: 780.79            New Medications Ordered This Visit   Medications   • Plecanatide (Trulance) 3 MG tablet     Sig: Take 1 tablet by mouth Daily.     Dispense:  30 tablet     Refill:  11     Current Outpatient Medications on File Prior to Visit   Medication Sig Dispense Refill   • albuterol sulfate  (90 Base) MCG/ACT inhaler Inhale 2 puffs Every 4 (Four) Hours As Needed for Wheezing (give inhale rinsurance will cover). 18 g 5   • beclomethasone (Qvar) 40 MCG/ACT inhaler Inhale 2 puffs 2 (Two) Times a Day. 8.6 g 11   • cetirizine (zyrTEC) 10 MG tablet Take 1 tablet by mouth Daily. 30 tablet 0   • EPINEPHrine (EpiPen 2-Augusto) 0.3 MG/0.3ML solution auto-injector injection Inject once for allergic reaction as  instructed and proceed to the nearest Emergency Room for further treatment 2 each 1   • ondansetron ODT (ZOFRAN-ODT) 4 MG disintegrating tablet Place 1 tablet on the tongue Every 8 (Eight) Hours As Needed for Nausea or Vomiting for up to 8 doses. 8 tablet 0   • [DISCONTINUED] brompheniramine-pseudoephedrine-DM (Bromfed DM) 30-2-10 MG/5ML syrup Take one tsp BID prn cough and congestion 120 mL 0   • [DISCONTINUED] clonazePAM (KlonoPIN) 0.5 MG tablet Take 1 tablet by mouth 2 (Two) Times a Day As Needed for Anxiety. 60 tablet 0   • [DISCONTINUED] magnesium oxide (MAG-OX) 400 MG tablet Take 1 nightly for 7 days, increase to twice daily if no GI side effects     • [DISCONTINUED] Vitamin B-2 (RIBOFLAVIN) 100 MG tablet tablet Take 1 tablet by mouth Daily.     • buPROPion (WELLBUTRIN) 75 MG tablet Take 1 tablet by mouth Daily. (Patient not taking: Reported on 4/27/2023) 90 tablet 3   • [DISCONTINUED] montelukast (Singulair) 10 MG tablet Take 1 tablet by mouth Every Night. 30 tablet 11     No current facility-administered medications on file prior to visit.       20 minutes  Follow Up   Return in about 6 months (around 10/27/2023) for Next scheduled follow up.     Diet discussed, meds as directed     Patient has taken linzess in the past     Has taken colace -its not helping     It's not just what you eat, but when you eat  Eat breakfast, and eat smaller meals throughout the day. A healthy breakfast can jumpstart your metabolism, while eating small, healthy meals (rather than the standard three large meals) keeps your energy up.   Avoid eating at night. Try to eat dinner earlier and fast for 14-16 hours until breakfast the next morning. Studies suggest that eating only when you’re most active and giving your digestive system a long break each day may help to regulate weight.       Add trulance as directed     Increase fiber, diet discussed, meds reviewed, see back if worsen

## 2023-04-28 LAB
25(OH)D3 SERPL-MCNC: 25.3 NG/ML (ref 30–100)
ALBUMIN SERPL-MCNC: 4.8 G/DL (ref 3.5–5.2)
ALBUMIN/GLOB SERPL: 1.7 G/DL
ALP SERPL-CCNC: 72 U/L (ref 39–117)
ALT SERPL W P-5'-P-CCNC: 16 U/L (ref 1–33)
ANION GAP SERPL CALCULATED.3IONS-SCNC: 11.6 MMOL/L (ref 5–15)
AST SERPL-CCNC: 21 U/L (ref 1–32)
BASOPHILS # BLD AUTO: 0.04 10*3/MM3 (ref 0–0.2)
BASOPHILS NFR BLD AUTO: 0.7 % (ref 0–1.5)
BILIRUB SERPL-MCNC: <0.2 MG/DL (ref 0–1.2)
BUN SERPL-MCNC: 12 MG/DL (ref 6–20)
BUN/CREAT SERPL: 16.2 (ref 7–25)
CALCIUM SPEC-SCNC: 9.7 MG/DL (ref 8.6–10.5)
CHLORIDE SERPL-SCNC: 102 MMOL/L (ref 98–107)
CO2 SERPL-SCNC: 25.4 MMOL/L (ref 22–29)
CREAT SERPL-MCNC: 0.74 MG/DL (ref 0.57–1)
DEPRECATED RDW RBC AUTO: 42.8 FL (ref 37–54)
EGFRCR SERPLBLD CKD-EPI 2021: 113.2 ML/MIN/1.73
EOSINOPHIL # BLD AUTO: 0.09 10*3/MM3 (ref 0–0.4)
EOSINOPHIL NFR BLD AUTO: 1.5 % (ref 0.3–6.2)
ERYTHROCYTE [DISTWIDTH] IN BLOOD BY AUTOMATED COUNT: 14.7 % (ref 12.3–15.4)
GLOBULIN UR ELPH-MCNC: 2.8 GM/DL
GLUCOSE SERPL-MCNC: 91 MG/DL (ref 65–99)
HBA1C MFR BLD: 5.5 % (ref 4.8–5.6)
HCT VFR BLD AUTO: 39.6 % (ref 34–46.6)
HGB BLD-MCNC: 12.8 G/DL (ref 12–15.9)
IMM GRANULOCYTES # BLD AUTO: 0.01 10*3/MM3 (ref 0–0.05)
IMM GRANULOCYTES NFR BLD AUTO: 0.2 % (ref 0–0.5)
IRON 24H UR-MRATE: 34 MCG/DL (ref 37–145)
LYMPHOCYTES # BLD AUTO: 2 10*3/MM3 (ref 0.7–3.1)
LYMPHOCYTES NFR BLD AUTO: 32.8 % (ref 19.6–45.3)
MAGNESIUM SERPL-MCNC: 2.2 MG/DL (ref 1.6–2.6)
MCH RBC QN AUTO: 26.1 PG (ref 26.6–33)
MCHC RBC AUTO-ENTMCNC: 32.3 G/DL (ref 31.5–35.7)
MCV RBC AUTO: 80.7 FL (ref 79–97)
MONOCYTES # BLD AUTO: 0.45 10*3/MM3 (ref 0.1–0.9)
MONOCYTES NFR BLD AUTO: 7.4 % (ref 5–12)
NEUTROPHILS NFR BLD AUTO: 3.5 10*3/MM3 (ref 1.7–7)
NEUTROPHILS NFR BLD AUTO: 57.4 % (ref 42.7–76)
NRBC BLD AUTO-RTO: 0 /100 WBC (ref 0–0.2)
PLATELET # BLD AUTO: 259 10*3/MM3 (ref 140–450)
PMV BLD AUTO: 10.8 FL (ref 6–12)
POTASSIUM SERPL-SCNC: 4.4 MMOL/L (ref 3.5–5.2)
PROT SERPL-MCNC: 7.6 G/DL (ref 6–8.5)
RBC # BLD AUTO: 4.91 10*6/MM3 (ref 3.77–5.28)
SODIUM SERPL-SCNC: 139 MMOL/L (ref 136–145)
TSH SERPL DL<=0.05 MIU/L-ACNC: 1.16 UIU/ML (ref 0.27–4.2)
VIT B12 BLD-MCNC: 477 PG/ML (ref 211–946)
WBC NRBC COR # BLD: 6.09 10*3/MM3 (ref 3.4–10.8)

## 2023-05-01 ENCOUNTER — TELEPHONE (OUTPATIENT)
Dept: FAMILY MEDICINE CLINIC | Facility: CLINIC | Age: 28
End: 2023-05-01
Payer: COMMERCIAL

## 2023-05-01 RX ORDER — CHOLECALCIFEROL (VITAMIN D3) 125 MCG
1 TABLET ORAL DAILY
Qty: 30 TABLET | Refills: 11 | Status: SHIPPED | OUTPATIENT
Start: 2023-05-01

## 2023-05-03 DIAGNOSIS — M54.40 CHRONIC BILATERAL LOW BACK PAIN WITH SCIATICA, SCIATICA LATERALITY UNSPECIFIED: Primary | ICD-10-CM

## 2023-05-03 DIAGNOSIS — G89.29 CHRONIC BILATERAL LOW BACK PAIN WITH SCIATICA, SCIATICA LATERALITY UNSPECIFIED: Primary | ICD-10-CM

## 2023-05-03 RX ORDER — BACLOFEN 10 MG/1
10 TABLET ORAL 3 TIMES DAILY
Qty: 90 TABLET | Refills: 3 | Status: SHIPPED | OUTPATIENT
Start: 2023-05-03

## 2023-05-08 DIAGNOSIS — B07.9 VIRAL WARTS, UNSPECIFIED TYPE: Primary | ICD-10-CM

## 2023-05-24 ENCOUNTER — TELEPHONE (OUTPATIENT)
Dept: FAMILY MEDICINE CLINIC | Facility: CLINIC | Age: 28
End: 2023-05-24
Payer: COMMERCIAL

## 2023-06-12 ENCOUNTER — OFFICE VISIT (OUTPATIENT)
Dept: FAMILY MEDICINE CLINIC | Facility: CLINIC | Age: 28
End: 2023-06-12
Payer: COMMERCIAL

## 2023-06-12 VITALS
HEART RATE: 88 BPM | WEIGHT: 168 LBS | OXYGEN SATURATION: 99 % | HEIGHT: 68 IN | DIASTOLIC BLOOD PRESSURE: 60 MMHG | SYSTOLIC BLOOD PRESSURE: 102 MMHG | BODY MASS INDEX: 25.46 KG/M2

## 2023-06-12 DIAGNOSIS — R53.83 MALAISE AND FATIGUE: Primary | ICD-10-CM

## 2023-06-12 DIAGNOSIS — R53.81 MALAISE AND FATIGUE: Primary | ICD-10-CM

## 2023-06-12 DIAGNOSIS — E66.3 OVERWEIGHT (BMI 25.0-29.9): ICD-10-CM

## 2023-06-12 NOTE — PROGRESS NOTES
"  Chief Complaint   Patient presents with    Fatigue     Weight gain     Subjective   Nirmala Tamayo is a 28 y.o. female.           Fatigue  This is a recurrent problem. The current episode started more than 1 month ago. The problem occurs intermittently. The problem has been gradually worsening. Associated symptoms include fatigue. Pertinent negatives include no arthralgias, chest pain, joint swelling, neck pain, rash, sore throat, swollen glands or weakness. The treatment provided mild relief.   Obesity  This is a recurrent problem. The current episode started more than 1 year ago. The problem has been unchanged. Associated symptoms include fatigue. Pertinent negatives include no arthralgias, chest pain, joint swelling, neck pain, rash, sore throat, swollen glands or weakness. Treatments tried: eating less and staying active.      The following portions of the patient's history were reviewed and updated as appropriate: allergies, current medications, past social history and problem list.    Review of Systems   Constitutional:  Positive for fatigue and unexpected weight change. Negative for activity change and appetite change.   HENT:  Negative for sore throat.    Eyes: Negative.  Negative for photophobia, pain, redness and visual disturbance.   Respiratory: Negative.     Cardiovascular:  Negative for chest pain.   Endocrine: Negative.    Genitourinary: Negative.    Musculoskeletal:  Negative for arthralgias, joint swelling and neck pain.   Skin:  Negative for rash.   Allergic/Immunologic: Negative.    Neurological: Negative.  Negative for weakness.   Hematological: Negative.    Psychiatric/Behavioral:  Negative for dysphoric mood and hallucinations.      Objective   /60   Pulse 88   Ht 172.7 cm (68\")   Wt 76.2 kg (168 lb)   SpO2 99%   BMI 25.54 kg/m²   Physical Exam  Vitals and nursing note reviewed.   Constitutional:       Appearance: Normal appearance.   HENT:      Head: Normocephalic and " atraumatic.      Right Ear: Tympanic membrane normal.      Nose: Nose normal.      Mouth/Throat:      Mouth: Mucous membranes are moist.   Eyes:      Pupils: Pupils are equal, round, and reactive to light.   Neck:      Vascular: No carotid bruit.   Cardiovascular:      Rate and Rhythm: Normal rate and regular rhythm.      Pulses: Normal pulses.   Pulmonary:      Effort: Pulmonary effort is normal.   Abdominal:      General: Abdomen is flat.      Palpations: Abdomen is soft.      Tenderness: There is no abdominal tenderness.      Hernia: No hernia is present.   Musculoskeletal:         General: No tenderness.      Cervical back: Normal range of motion. No rigidity or tenderness.      Thoracic back: No swelling, edema, deformity, signs of trauma, lacerations, spasms or bony tenderness. Normal range of motion. No scoliosis.   Lymphadenopathy:      Cervical: No cervical adenopathy.   Skin:     General: Skin is warm.      Coloration: Skin is not jaundiced or pale.      Findings: No bruising or erythema.   Neurological:      General: No focal deficit present.      Mental Status: She is alert and oriented to person, place, and time.      Cranial Nerves: No cranial nerve deficit.      Sensory: No sensory deficit.      Motor: No weakness.      Coordination: Coordination normal.   Psychiatric:         Mood and Affect: Mood normal.            Assessment & Plan     Problems Addressed this Visit    None  Visit Diagnoses       Malaise and fatigue    -  Primary          Diagnoses         Codes Comments    Malaise and fatigue    -  Primary ICD-10-CM: R53.81, R53.83  ICD-9-CM: 780.79              New Medications Ordered This Visit   Medications    Liraglutide (SAXENDA) 18 MG/3ML injection pen     Sig: Inject 0.6mg under skin daily for week one, THEN 1.2mg daily for week two, THEN 1.8mg daily for week three, then 2.4mg daily for week four.     Dispense:  15 mL     Refill:  1    Liraglutide (SAXENDA) 18 MG/3ML injection pen     Sig:  Inject 3 mg under the skin into the appropriate area as directed Daily.     Dispense:  15 mL     Refill:  11     Current Outpatient Medications on File Prior to Visit   Medication Sig Dispense Refill    albuterol sulfate  (90 Base) MCG/ACT inhaler Inhale 2 puffs Every 4 (Four) Hours As Needed for Wheezing (give inhale rinsurance will cover). 18 g 5    baclofen (LIORESAL) 10 MG tablet Take 1 tablet by mouth 3 (Three) Times a Day. 90 tablet 3    beclomethasone (Qvar) 40 MCG/ACT inhaler Inhale 2 puffs 2 (Two) Times a Day. 8.6 g 11    buPROPion (WELLBUTRIN) 75 MG tablet Take 1 tablet by mouth Daily. 90 tablet 3    cetirizine (zyrTEC) 10 MG tablet Take 1 tablet by mouth Daily. 30 tablet 0    EPINEPHrine (EpiPen 2-Augusto) 0.3 MG/0.3ML solution auto-injector injection Inject once for allergic reaction as instructed and proceed to the nearest Emergency Room for further treatment 2 each 1    Ergocalciferol (Vitamin D2) 50 MCG (2000 UT) tablet Take 1 tablet by mouth Daily. 30 tablet 11    ferrous fulfate dried ER 45 MG tablet controlled-release tablet Take 140 mg by mouth 3 (Three) Times a Day With Meals. 30 tablet 11    ondansetron ODT (ZOFRAN-ODT) 4 MG disintegrating tablet Place 1 tablet on the tongue Every 8 (Eight) Hours As Needed for Nausea or Vomiting for up to 8 doses. 8 tablet 0    Plecanatide (Trulance) 3 MG tablet Take 1 tablet by mouth Daily. 30 tablet 11    [DISCONTINUED] montelukast (Singulair) 10 MG tablet Take 1 tablet by mouth Every Night. 30 tablet 11     No current facility-administered medications on file prior to visit.       20 minutes  Follow Up   No follow-ups on file.        Diet as directed, meds as directed, follow up if worsen  No fast foods foods, no sugar or drinks with sugar  Suggest 10 pound weight loss only

## 2023-06-19 ENCOUNTER — TELEPHONE (OUTPATIENT)
Dept: FAMILY MEDICINE CLINIC | Facility: CLINIC | Age: 28
End: 2023-06-19
Payer: COMMERCIAL

## 2023-07-26 ENCOUNTER — INITIAL PRENATAL (OUTPATIENT)
Dept: OBSTETRICS AND GYNECOLOGY | Facility: CLINIC | Age: 28
End: 2023-07-26
Payer: COMMERCIAL

## 2023-07-26 VITALS — DIASTOLIC BLOOD PRESSURE: 60 MMHG | WEIGHT: 168 LBS | BODY MASS INDEX: 25.54 KG/M2 | SYSTOLIC BLOOD PRESSURE: 108 MMHG

## 2023-07-26 DIAGNOSIS — O21.9 NAUSEA AND VOMITING IN PREGNANCY PRIOR TO 22 WEEKS GESTATION: ICD-10-CM

## 2023-07-26 DIAGNOSIS — Z87.59 HISTORY OF PRE-ECLAMPSIA: ICD-10-CM

## 2023-07-26 DIAGNOSIS — Z34.91 INITIAL OBSTETRIC VISIT IN FIRST TRIMESTER: Primary | ICD-10-CM

## 2023-07-26 PROCEDURE — 0501F PRENATAL FLOW SHEET: CPT | Performed by: NURSE PRACTITIONER

## 2023-07-26 RX ORDER — ONDANSETRON 4 MG/1
4 TABLET, FILM COATED ORAL DAILY PRN
Qty: 30 TABLET | Refills: 1 | Status: SHIPPED | OUTPATIENT
Start: 2023-07-26 | End: 2024-07-25

## 2023-07-26 RX ORDER — DOCUSATE SODIUM 100 MG/1
100 CAPSULE, LIQUID FILLED ORAL 2 TIMES DAILY
Qty: 60 CAPSULE | Refills: 3 | Status: SHIPPED | OUTPATIENT
Start: 2023-07-26

## 2023-07-26 RX ORDER — DIPHENHYDRAMINE HYDROCHLORIDE 25 MG/1
25 CAPSULE ORAL 2 TIMES DAILY
Qty: 60 TABLET | Refills: 3 | Status: SHIPPED | OUTPATIENT
Start: 2023-07-26

## 2023-07-26 RX ORDER — POLYETHYLENE GLYCOL 3350 17 G/17G
17 POWDER, FOR SOLUTION ORAL DAILY
Qty: 10 EACH | Refills: 3 | Status: SHIPPED | OUTPATIENT
Start: 2023-07-26

## 2023-07-26 RX ORDER — ASPIRIN 81 MG/1
81 TABLET ORAL DAILY
Qty: 30 TABLET | Refills: 6 | Status: SHIPPED | OUTPATIENT
Start: 2023-07-26

## 2023-07-26 NOTE — PROGRESS NOTES
[facility]  Obstetrics  Date of Service: 2023    CHIEF COMPLAINT:  New prenatal visit    HISTORY OF PRESENT ILLNESS:  Nirmala Tamyao is a 28 y.o. y/o  at 7w4d by [facility] (Patient's last menstrual period was 2023 (exact date).  This was a unplanned pregnancy and the patient is supported by spouse.  Reports  nausea, no vomiting.  She denies any vaginal bleeding.  She has  started taking a prenatal vitamin.    REVIEW OF SYSTEMS  Review of Systems   Constitutional:  Negative for chills, fatigue, fever, unexpected weight gain and unexpected weight loss.   Respiratory:  Negative for shortness of breath.    Cardiovascular:  Negative for chest pain and palpitations.   Gastrointestinal:  Positive for constipation and nausea. Negative for abdominal pain, diarrhea and vomiting.   Endocrine: Negative for cold intolerance and heat intolerance.   Genitourinary:  Negative for amenorrhea, breast discharge, breast lump, breast pain, difficulty urinating, dysuria, frequency, menstrual problem, pelvic pain, pelvic pressure, urinary incontinence, vaginal bleeding, vaginal discharge and vaginal pain.   Skin:  Negative for rash.   Neurological:  Negative for weakness and headache.   Psychiatric/Behavioral:  Negative for sleep disturbance, depressed mood and stress.      PRENATAL RISK FACTORS   Problems (from 23 to present)       No problems associated with this episode.            DATING CRITERIA:  LMP (2023) -- ELISE 3/1/2024  1TUS (2023 at 7w4d) -- FINAL ELISE 3/9/2024    OBSTETRIC HISTORY:  OB History    Para Term  AB Living   5 4 3 1   4   SAB IAB Ectopic Molar Multiple Live Births           0 4      # Outcome Date GA Lbr Peter/2nd Weight Sex Delivery Anes PTL Lv   5 Current            4 Term 20 37w1d 08:40 / 00:05 3490 g (7 lb 11.1 oz) M Vag-Spont None Y [facility]   3 Term 18 37w5d  3317 g (7 lb 5 oz) M Vag-Spont None  [facility]      Birth Comments: IOL        Complications: Mild preeclampsia   2  14 36w0d  2835 g (6 lb 4 oz) F Vag-Spont EPI  [facility]      Birth Comments: IOL      Complications: Preeclampsia   1 Term 13 39w0d  3487 g (7 lb 11 oz) M Vag-Spont EPI  [facility]      Birth Comments: IOL. SPINAL HEADACHE; HAD BLOOD PATCH DONE POSTPARTUM      Complications: Oligohydramnios     GYN HISTORY:  Denies h/o sexually transmitted infections/pelvic inflammatory disease   H/o abnormal pap smears (noted in PMH)  Last pap smear: 2023 NIL  Last Completed Pap Smear            PAP SMEAR (Every 3 Years) Next due on 2023  LIQUID-BASED PAP SMEAR, [facility] ([facility])    04/15/2021  Liquid-based Pap Smear, Screening                  Denies h/o gynecologic surgeries, including biopsies of the cervix    PAST MEDICAL HISTORY:  Past Medical History:   Diagnosis Date    Abnormal EKG 2021    Abnormal Pap smear of cervix     Acute allergic reaction     Acute bronchitis     Acute pharyngitis     Agoraphobia with panic attacks     Allergic rhinitis     Anemia     Anxiety     Anxiety state     Asthma     Stable    Back strain     Constipation     Cough     Disturbance of skin sensation     Gestational hypertension     with second pregnancy    Headache     History of transfusion     after second delivery    HPV (human papilloma virus) infection     Hx of preeclampsia, prior pregnancy, currently pregnant 2018    Irregular periods     Irritable bowel syndrome with constipation     Low back pain     Lumbosacral dysfunction     Neck pain     Palpitations     Preeclampsia     Rh negative state in antepartum period, third trimester 2017    Rhinitis     Severe depression     Spasm     cervical spasm    Spinal headache     Upper respiratory infection     Urinary tract infection 2017    Vaginal irritation     Vitreous floaters     prob, not seen on exam    Wheezing      PAST SURGICAL HISTORY:  Past Surgical History:   Procedure  Laterality Date    COLONOSCOPY N/A 3/7/2022    Procedure: COLONOSCOPY;  Surgeon: Sloan Sterling MD;  Location: [facility] ENDOSCOPY;  Service: Gastroenterology;  Laterality: N/A;    PROCEDURE GENERIC CONVERTED  02/01/2016    Physical Therapy Consult    WISDOM TOOTH EXTRACTION       FAMILY HISTORY:  Family History   Problem Relation Age of Onset    COPD Father     Heart disease Mother     Hypertension Mother     Hyperthyroidism Mother     Diabetes Mother     Kidney failure Sister     Heart failure Sister     COPD Sister     Hypothyroidism Sister     No Known Problems Son     Asthma Son     Autism Son     Heart murmur Son     No Known Problems Son     Febrile seizures Daughter     Diabetes Paternal Grandmother     Cirrhosis Paternal Grandmother     Cancer Maternal Grandmother     Diabetes Maternal Grandmother     Hypertension Maternal Grandmother     Parkinsonism Maternal Grandfather     Heart disease Maternal Grandfather     Cancer Other     Diabetes Other     Hypertension Other     Stroke Other     Thyroid disease Other     Gallbladder disease Other         Gallstones     SOCIAL HISTORY:  Social History     Socioeconomic History    Marital status:    Tobacco Use    Smoking status: Never    Smokeless tobacco: Never   Vaping Use    Vaping Use: Never used   Substance and Sexual Activity    Alcohol use: No    Drug use: No    Sexual activity: Yes     Partners: Male     Birth control/protection: None     Comment: last pap smear 2/13/23 negative     GENETIC SCREENING:  Age >36 yo as of ELISE: No  Thalassemia: No  NTD: No  CHD: No  Down Syndrome/MR/Fragile X/Autism: No  Ashkenazi [employer]an with Manas-Sachs, [place], familial dysautonomia: No  Sickle cell disease or trait: No  Hemophilia: No  Muscular dystrophy: No  Cystic fibrosis: No  Morrill's chorea: No  Birth defects: No  Genetic/chromosomal disorders: No    INFECTION HISTORY:  TB exposure: No  HSV: No  Illness since LMP: No  Prior GBS infected child: No  STIs:  No    ALLERGIES:  Allergies   Allergen Reactions    Banana Angioedema and Hives     Itchy throat    Latex Hives and Angioedema       MEDICATIONS:  Prior to Admission medications    Medication Sig Start Date End Date Taking? Authorizing Provider   albuterol sulfate  (90 Base) MCG/ACT inhaler Inhale 2 puffs Every 4 (Four) Hours As Needed for Wheezing (give inhale rinsurance will cover). 3/8/23  Yes Stacia Feldman APRN   prenatal vitamin (prenatal, CLASSIC, vitamin) tablet Take  by mouth Daily.   Yes Provider, MD Rosa   docusate sodium (Colace) 100 MG capsule Take 1 capsule by mouth 2 (Two) Times a Day. 7/26/23   Marifer Krishna APRN   doxylamine (UNISOM) 25 MG tablet Take 1 tablet by mouth At Night As Needed for Sleep or Nausea. 7/26/23   Marifer Krishna APRN   EPINEPHrine (EpiPen 2-Augusto) 0.3 MG/0.3ML solution auto-injector injection Inject once for allergic reaction as instructed and proceed to the nearest Emergency Room for further treatment  Patient not taking: Reported on 7/12/2023 3/3/23   Linnea Verduzco APRN   polyethylene glycol (MiraLax) 17 g packet Take 17 g by mouth Daily. 7/26/23   Marifer Krishna APRN   vitamin B-6 (PYRIDOXINE) 25 MG tablet Take 1 tablet by mouth 2 (Two) Times a Day. 7/26/23   Marifer Krishna APRN   montelukast (Singulair) 10 MG tablet Take 1 tablet by mouth Every Night. 1/28/21 8/5/21  Linnea Verduzco APRN       PHYSICAL EXAM:   /60   Wt 76.2 kg (168 lb)   LMP 05/26/2023 (Exact Date)   BMI 25.54 kg/m²   General: Alert, healthy, no distress, well nourished and well developed.  Neurologic: Alert, oriented to person, place, and time.  Gait normal.  Cranial nerves II-XII grossly intact.  HEENT: Normocephalic, atraumatic.  Extraocular muscles intact, pupils equal and reactive x2.    Teeth: Normal hygiene.  Neck: Supple, no adenopathy, thyroid normal size, non-tender, without nodularity, trachea midline.  Lungs: Normal respiratory effort.   Clear to auscultation bilaterally.  No wheezes, rhonci, or rales.  Heart: Regular rate and rhythm.  No murmer, rub or gallop.  Abdomen: Soft, non-tender, non-distended,no masses, no hepatosplenomegaly, no hernia.  Skin: No rash, no lesions.  Extremities: No cyanosis, clubbing or edema.          IMPRESSION:  Nirmala Tamayo is a 28 y.o.  at 7w4d for a new prenatal visit. Dating US demonstrates a single IUP at 7w4d by [facility]. Final ELISE of 3/9/2024.     PLAN:  1.  IUP at 7w4d  - Options counseling performed and patient desires continuation of pregnancy to term   - Prenatal labs reviewed. Needs to complete 24 hr urine collection, has supplies at home.   - Genetic testing, including cystic fibrosis, was discussed and patient will consider. Brochure reviewed and given to patient.  - Continue prenatal vitamins  - Weight gain counseling performed.   - [place]ravid BMI 25-29.9: Recommend 15-25 lb  - Unisom, Vitamin B6, Zofran, Colace, Miralax, and baby ASA rx sent.   - Counseled on starting Baby ASA at 12 weeks for Pre-Eclampsia prevention.   - Return to clinic in 4 weeks for return prenatal visit     Diagnosis Plan   1. Initial obstetric visit in first trimester        2. Nausea and vomiting in pregnancy prior to 22 weeks gestation  doxylamine (UNISOM) 25 MG tablet      3. History of pre-eclampsia  aspirin 81 MG EC tablet        Marifer Krishna, JOSE CARLOS  2023  11:33 CDT

## 2023-08-10 ENCOUNTER — REFERRAL TRIAGE (OUTPATIENT)
Dept: OBSTETRICS AND GYNECOLOGY | Facility: HOSPITAL | Age: 28
End: 2023-08-10
Payer: COMMERCIAL

## 2023-08-23 ENCOUNTER — LAB (OUTPATIENT)
Dept: LAB | Facility: HOSPITAL | Age: 28
End: 2023-08-23
Payer: COMMERCIAL

## 2023-08-23 ENCOUNTER — ROUTINE PRENATAL (OUTPATIENT)
Dept: OBSTETRICS AND GYNECOLOGY | Facility: CLINIC | Age: 28
End: 2023-08-23
Payer: COMMERCIAL

## 2023-08-23 VITALS — WEIGHT: 172.4 LBS | SYSTOLIC BLOOD PRESSURE: 100 MMHG | BODY MASS INDEX: 26.21 KG/M2 | DIASTOLIC BLOOD PRESSURE: 64 MMHG

## 2023-08-23 DIAGNOSIS — Z87.59 HISTORY OF PRE-ECLAMPSIA: Primary | ICD-10-CM

## 2023-08-23 DIAGNOSIS — O26.892 RH NEGATIVE STATUS DURING PREGNANCY IN SECOND TRIMESTER: ICD-10-CM

## 2023-08-23 DIAGNOSIS — O09.92 HIGH-RISK PREGNANCY IN SECOND TRIMESTER: ICD-10-CM

## 2023-08-23 DIAGNOSIS — Z81.8 FAMILY HISTORY OF AUTISM: ICD-10-CM

## 2023-08-23 DIAGNOSIS — Z34.80 PRENATAL CARE OF MULTIGRAVIDA, ANTEPARTUM: ICD-10-CM

## 2023-08-23 DIAGNOSIS — O99.619 GASTROESOPHAGEAL REFLUX IN PREGNANCY: ICD-10-CM

## 2023-08-23 DIAGNOSIS — Z67.91 RH NEGATIVE STATUS DURING PREGNANCY IN FIRST TRIMESTER: ICD-10-CM

## 2023-08-23 DIAGNOSIS — Z87.59 HISTORY OF GESTATIONAL HYPERTENSION: ICD-10-CM

## 2023-08-23 DIAGNOSIS — O26.891 RH NEGATIVE STATUS DURING PREGNANCY IN FIRST TRIMESTER: ICD-10-CM

## 2023-08-23 DIAGNOSIS — K21.9 GASTROESOPHAGEAL REFLUX IN PREGNANCY: ICD-10-CM

## 2023-08-23 DIAGNOSIS — Z67.91 RH NEGATIVE STATUS DURING PREGNANCY IN SECOND TRIMESTER: ICD-10-CM

## 2023-08-23 LAB
COLLECT DURATION TIME UR: 24 HRS
PROT 24H UR-MRATE: 158.6 MG/24HOURS (ref 0–150)
SPECIMEN VOL 24H UR: 1300 ML

## 2023-08-23 PROCEDURE — 36415 COLL VENOUS BLD VENIPUNCTURE: CPT

## 2023-08-23 PROCEDURE — 0502F SUBSEQUENT PRENATAL CARE: CPT | Performed by: STUDENT IN AN ORGANIZED HEALTH CARE EDUCATION/TRAINING PROGRAM

## 2023-08-23 PROCEDURE — 84156 ASSAY OF PROTEIN URINE: CPT

## 2023-08-23 PROCEDURE — 81050 URINALYSIS VOLUME MEASURE: CPT

## 2023-08-23 RX ORDER — FAMOTIDINE 40 MG/1
40 TABLET, FILM COATED ORAL DAILY
Qty: 30 TABLET | Refills: 3 | Status: SHIPPED | OUTPATIENT
Start: 2023-08-23

## 2023-08-23 RX ORDER — ONDANSETRON 4 MG/1
4 TABLET, ORALLY DISINTEGRATING ORAL EVERY 8 HOURS PRN
Qty: 30 TABLET | Refills: 2 | Status: SHIPPED | OUTPATIENT
Start: 2023-08-23

## 2023-08-23 NOTE — PROGRESS NOTES
CC: Prenatal visit    Nirmala Tamayo is a 28 y.o.  at 11w4d.  Doing well.  Occasional mild cramping. Denies contractions, LOF, or VB.  Not yet feling FM.    Significant nausea and reflux.    /64   Wt 78.2 kg (172 lb 6.4 oz)   LMP 2023 (Exact Date)   BMI 26.21 kg/mý   SVE: deferred     Fetal Heart Rate: +vscan    A/P: Nirmala Tamayo is a 28 y.o.  at 11w4d.  - RTC in 4 weeks  - Pepcid, Zofran for nausea and reflux  - ASA at home, to start 12 weeks for preE ppx  - Turned in 24 hr urine protein today  - Reviewed COVID-19 visitation policy  - Reviewed COVID-19 precautions    Problem List Items Addressed This Visit          Gravid and     History of pre-eclampsia - Primary    Overview     Baseline 24 hr protein pending  23 Cr 0.7, AST/ALT   ASA @ 12 weeks         Rh negative status during pregnancy in first trimester    Overview     Rhogam 28 weeks or with bleeding         High-risk pregnancy in second trimester    Rh negative status during pregnancy in second trimester    Gastroesophageal reflux in pregnancy    Overview     Pepcid, Tums, Zofran         Relevant Medications    famotidine (Pepcid) 40 MG tablet     Other Visit Diagnoses       Family history of autism        Relevant Orders    [facility] Prenatal Test: Chromosomes 13, 18, 21, X & Y: Triploidy 22Q.11.2 Deletion - Blood,               Diagnosis Plan   1. History of pre-eclampsia        2. Rh negative status during pregnancy in first trimester        3. High-risk pregnancy in second trimester        4. Rh negative status during pregnancy in second trimester        5. Family history of autism  [facility] Prenatal Test: Chromosomes 13, 18, 21, X & Y: Triploidy 22Q.11.2 Deletion - Blood,      6. Gastroesophageal reflux in pregnancy          Stacey Prado DO  2023  20:11 CDT

## 2023-08-24 ENCOUNTER — LAB (OUTPATIENT)
Dept: LAB | Facility: HOSPITAL | Age: 28
End: 2023-08-24
Payer: COMMERCIAL

## 2023-08-24 DIAGNOSIS — Z00.00 GENERAL MEDICAL EXAM: Primary | ICD-10-CM

## 2023-08-24 PROCEDURE — 80061 LIPID PANEL: CPT | Performed by: NURSE PRACTITIONER

## 2023-08-24 PROCEDURE — 82607 VITAMIN B-12: CPT | Performed by: NURSE PRACTITIONER

## 2023-08-24 PROCEDURE — 83036 HEMOGLOBIN GLYCOSYLATED A1C: CPT | Performed by: NURSE PRACTITIONER

## 2023-08-24 PROCEDURE — 85025 COMPLETE CBC W/AUTO DIFF WBC: CPT | Performed by: NURSE PRACTITIONER

## 2023-08-24 PROCEDURE — 83540 ASSAY OF IRON: CPT | Performed by: NURSE PRACTITIONER

## 2023-08-24 PROCEDURE — 82306 VITAMIN D 25 HYDROXY: CPT | Performed by: NURSE PRACTITIONER

## 2023-08-24 PROCEDURE — 84443 ASSAY THYROID STIM HORMONE: CPT | Performed by: NURSE PRACTITIONER

## 2023-08-24 PROCEDURE — 80053 COMPREHEN METABOLIC PANEL: CPT | Performed by: NURSE PRACTITIONER

## 2023-08-24 PROCEDURE — 36415 COLL VENOUS BLD VENIPUNCTURE: CPT | Performed by: NURSE PRACTITIONER

## 2023-08-25 ENCOUNTER — TELEPHONE (OUTPATIENT)
Dept: OBSTETRICS AND GYNECOLOGY | Facility: CLINIC | Age: 28
End: 2023-08-25
Payer: COMMERCIAL

## 2023-08-25 ENCOUNTER — LAB (OUTPATIENT)
Dept: LAB | Facility: HOSPITAL | Age: 28
End: 2023-08-25
Payer: COMMERCIAL

## 2023-08-25 DIAGNOSIS — R53.83 MALAISE AND FATIGUE: Primary | ICD-10-CM

## 2023-08-25 DIAGNOSIS — R53.81 MALAISE AND FATIGUE: Primary | ICD-10-CM

## 2023-08-25 LAB
25(OH)D3 SERPL-MCNC: 29.9 NG/ML (ref 30–100)
ALBUMIN SERPL-MCNC: 3.9 G/DL (ref 3.5–5.2)
ALBUMIN/GLOB SERPL: 1.4 G/DL
ALP SERPL-CCNC: 53 U/L (ref 39–117)
ALT SERPL W P-5'-P-CCNC: 13 U/L (ref 1–33)
ANION GAP SERPL CALCULATED.3IONS-SCNC: 11 MMOL/L (ref 5–15)
AST SERPL-CCNC: 16 U/L (ref 1–32)
BASOPHILS # BLD AUTO: 0.03 10*3/MM3 (ref 0–0.2)
BASOPHILS NFR BLD AUTO: 0.4 % (ref 0–1.5)
BILIRUB SERPL-MCNC: <0.2 MG/DL (ref 0–1.2)
BUN SERPL-MCNC: 8 MG/DL (ref 6–20)
BUN/CREAT SERPL: 17 (ref 7–25)
CALCIUM SPEC-SCNC: 9.5 MG/DL (ref 8.6–10.5)
CHLORIDE SERPL-SCNC: 103 MMOL/L (ref 98–107)
CHOLEST SERPL-MCNC: 135 MG/DL (ref 0–200)
CO2 SERPL-SCNC: 21 MMOL/L (ref 22–29)
CREAT SERPL-MCNC: 0.47 MG/DL (ref 0.57–1)
DEPRECATED RDW RBC AUTO: 45.4 FL (ref 37–54)
EGFRCR SERPLBLD CKD-EPI 2021: 133.2 ML/MIN/1.73
EOSINOPHIL # BLD AUTO: 0.05 10*3/MM3 (ref 0–0.4)
EOSINOPHIL NFR BLD AUTO: 0.6 % (ref 0.3–6.2)
ERYTHROCYTE [DISTWIDTH] IN BLOOD BY AUTOMATED COUNT: 14.9 % (ref 12.3–15.4)
GLOBULIN UR ELPH-MCNC: 2.7 GM/DL
GLUCOSE SERPL-MCNC: 105 MG/DL (ref 65–99)
HBA1C MFR BLD: 5.5 % (ref 4.8–5.6)
HCT VFR BLD AUTO: 35.7 % (ref 34–46.6)
HDLC SERPL-MCNC: 70 MG/DL (ref 40–60)
HGB BLD-MCNC: 11.8 G/DL (ref 12–15.9)
IMM GRANULOCYTES # BLD AUTO: 0.02 10*3/MM3 (ref 0–0.05)
IMM GRANULOCYTES NFR BLD AUTO: 0.2 % (ref 0–0.5)
IRON 24H UR-MRATE: 98 MCG/DL (ref 37–145)
LDLC SERPL CALC-MCNC: 42 MG/DL (ref 0–100)
LDLC/HDLC SERPL: 0.54 {RATIO}
LYMPHOCYTES # BLD AUTO: 1.31 10*3/MM3 (ref 0.7–3.1)
LYMPHOCYTES NFR BLD AUTO: 16 % (ref 19.6–45.3)
MCH RBC QN AUTO: 27.8 PG (ref 26.6–33)
MCHC RBC AUTO-ENTMCNC: 33.1 G/DL (ref 31.5–35.7)
MCV RBC AUTO: 84.2 FL (ref 79–97)
MONOCYTES # BLD AUTO: 0.41 10*3/MM3 (ref 0.1–0.9)
MONOCYTES NFR BLD AUTO: 5 % (ref 5–12)
NEUTROPHILS NFR BLD AUTO: 6.38 10*3/MM3 (ref 1.7–7)
NEUTROPHILS NFR BLD AUTO: 77.8 % (ref 42.7–76)
NRBC BLD AUTO-RTO: 0 /100 WBC (ref 0–0.2)
PLATELET # BLD AUTO: 189 10*3/MM3 (ref 140–450)
PMV BLD AUTO: 10.2 FL (ref 6–12)
POTASSIUM SERPL-SCNC: 4.2 MMOL/L (ref 3.5–5.2)
PROT SERPL-MCNC: 6.6 G/DL (ref 6–8.5)
RBC # BLD AUTO: 4.24 10*6/MM3 (ref 3.77–5.28)
SODIUM SERPL-SCNC: 135 MMOL/L (ref 136–145)
TRIGL SERPL-MCNC: 136 MG/DL (ref 0–150)
TSH SERPL DL<=0.05 MIU/L-ACNC: 0.15 UIU/ML (ref 0.27–4.2)
VIT B12 BLD-MCNC: 343 PG/ML (ref 211–946)
VLDLC SERPL-MCNC: 23 MG/DL (ref 5–40)
WBC NRBC COR # BLD: 8.2 10*3/MM3 (ref 3.4–10.8)

## 2023-08-25 PROCEDURE — 84443 ASSAY THYROID STIM HORMONE: CPT | Performed by: NURSE PRACTITIONER

## 2023-08-25 PROCEDURE — 84439 ASSAY OF FREE THYROXINE: CPT | Performed by: NURSE PRACTITIONER

## 2023-08-25 PROCEDURE — 84481 FREE ASSAY (FT-3): CPT | Performed by: NURSE PRACTITIONER

## 2023-08-25 PROCEDURE — 86376 MICROSOMAL ANTIBODY EACH: CPT | Performed by: NURSE PRACTITIONER

## 2023-08-25 NOTE — TELEPHONE ENCOUNTER
----- Message from Nirmala Tamayo sent at 8/24/2023  6:12 PM CDT -----  Regarding: Question regarding PROTEIN, 24 HR URINE  Contact: [phone]  Also, I've been having pretty bad cramping.  More tonight than normal. Should I get checked out? I haven't had an u/s since around 7 weeks to check anything or check on the subchronic hemmorage and the cramping has gotten worse.

## 2023-08-25 NOTE — TELEPHONE ENCOUNTER
Spoke with patient and informed her to stay well hydrated with the hot weather that we are lately. Patient stated that she's trying to drink plenty of fluids. I asked if she's having any BM issues she stated that they have been pretty normal. I told her that there is not a need currently to have an US and that if the cramping worsens over weekend to call office.

## 2023-08-26 LAB
T3FREE SERPL-MCNC: 3.11 PG/ML (ref 2–4.4)
T4 FREE SERPL-MCNC: 1.13 NG/DL (ref 0.93–1.7)
THYROPEROXIDASE AB SERPL-ACNC: 12 IU/ML (ref 0–34)
TSH SERPL DL<=0.05 MIU/L-ACNC: 0.18 UIU/ML (ref 0.27–4.2)

## 2023-08-28 DIAGNOSIS — R79.89 ABNORMAL TSH: Primary | ICD-10-CM

## 2023-08-29 LAB
Lab: NORMAL
NTRA FETAL FRACTION: NORMAL
NTRA GENDER OF FETUS: NORMAL
NTRA MONOSOMY X AGE-BASED RISK TEXT: NORMAL
NTRA MONOSOMY X RESULT TEXT: NORMAL
NTRA MONOSOMY X RISK SCORE TEXT: NORMAL
NTRA TRIPLOIDY RESULT TEXT: NORMAL
NTRA TRISOMY 13 AGE-BASED RISK TEXT: NORMAL
NTRA TRISOMY 13 RESULT TEXT: NORMAL
NTRA TRISOMY 13 RISK SCORE TEXT: NORMAL
NTRA TRISOMY 18 AGE-BASED RISK TEXT: NORMAL
NTRA TRISOMY 18 RESULT TEXT: NORMAL
NTRA TRISOMY 18 RISK SCORE TEXT: NORMAL
NTRA TRISOMY 21 AGE-BASED RISK TEXT: NORMAL
NTRA TRISOMY 21 RESULT TEXT: NORMAL
NTRA TRISOMY 21 RISK SCORE TEXT: NORMAL

## 2023-09-06 DIAGNOSIS — J30.2 SEASONAL ALLERGIC RHINITIS, UNSPECIFIED TRIGGER: Primary | ICD-10-CM

## 2023-09-06 RX ORDER — FLUTICASONE PROPIONATE 50 MCG
2 SPRAY, SUSPENSION (ML) NASAL DAILY
Qty: 18.2 ML | Refills: 5 | Status: SHIPPED | OUTPATIENT
Start: 2023-09-06 | End: 2024-09-05

## 2023-09-06 RX ORDER — CETIRIZINE HYDROCHLORIDE 10 MG/1
10 TABLET ORAL DAILY
Qty: 30 TABLET | Refills: 5 | Status: SHIPPED | OUTPATIENT
Start: 2023-09-06

## 2023-09-20 ENCOUNTER — PATIENT OUTREACH (OUTPATIENT)
Dept: LABOR AND DELIVERY | Facility: HOSPITAL | Age: 28
End: 2023-09-20
Payer: COMMERCIAL

## 2023-09-20 ENCOUNTER — ROUTINE PRENATAL (OUTPATIENT)
Dept: OBSTETRICS AND GYNECOLOGY | Facility: CLINIC | Age: 28
End: 2023-09-20
Payer: COMMERCIAL

## 2023-09-20 VITALS — DIASTOLIC BLOOD PRESSURE: 68 MMHG | WEIGHT: 178 LBS | SYSTOLIC BLOOD PRESSURE: 112 MMHG | BODY MASS INDEX: 27.06 KG/M2

## 2023-09-20 DIAGNOSIS — K21.9 GASTROESOPHAGEAL REFLUX IN PREGNANCY: ICD-10-CM

## 2023-09-20 DIAGNOSIS — Z87.59 HISTORY OF PRE-ECLAMPSIA: ICD-10-CM

## 2023-09-20 DIAGNOSIS — Z67.91 RH NEGATIVE STATUS DURING PREGNANCY IN SECOND TRIMESTER: ICD-10-CM

## 2023-09-20 DIAGNOSIS — Z36.3 ENCOUNTER FOR ROUTINE SCREENING FOR FETAL MALFORMATION USING ULTRASOUND: ICD-10-CM

## 2023-09-20 DIAGNOSIS — O26.899 PELVIC CRAMPING IN ANTEPARTUM PERIOD: ICD-10-CM

## 2023-09-20 DIAGNOSIS — O26.892 RH NEGATIVE STATUS DURING PREGNANCY IN SECOND TRIMESTER: ICD-10-CM

## 2023-09-20 DIAGNOSIS — O99.619 GASTROESOPHAGEAL REFLUX IN PREGNANCY: ICD-10-CM

## 2023-09-20 DIAGNOSIS — Z3A.15 15 WEEKS GESTATION OF PREGNANCY: Primary | ICD-10-CM

## 2023-09-20 DIAGNOSIS — R10.2 PELVIC CRAMPING IN ANTEPARTUM PERIOD: ICD-10-CM

## 2023-09-20 DIAGNOSIS — O09.92 HIGH-RISK PREGNANCY IN SECOND TRIMESTER: ICD-10-CM

## 2023-09-20 PROBLEM — O26.891 RH NEGATIVE STATUS DURING PREGNANCY IN FIRST TRIMESTER: Status: RESOLVED | Noted: 2023-08-23 | Resolved: 2023-09-20

## 2023-09-20 LAB
BACTERIAL VAGINOSIS VAG-IMP: NEGATIVE
CANDIDA DNA VAG QL NAA+PROBE: DETECTED
CANDIDA DNA VAG QL NAA+PROBE: NOT DETECTED
T VAGINALIS DNA VAG QL NAA+PROBE: NOT DETECTED

## 2023-09-20 PROCEDURE — 0502F SUBSEQUENT PRENATAL CARE: CPT | Performed by: NURSE PRACTITIONER

## 2023-09-20 PROCEDURE — 0352U HC INF DIS BACTERIAL VAGINOSIS AND VAGINITIS AMPLIFIED PROBE TECHNIQUE: CPT | Performed by: NURSE PRACTITIONER

## 2023-09-20 RX ORDER — FLUCONAZOLE 150 MG/1
TABLET ORAL
Qty: 2 TABLET | Refills: 0 | Status: SHIPPED | OUTPATIENT
Start: 2023-09-20

## 2023-09-20 NOTE — PROGRESS NOTES
CC: Prenatal visit    Nirmala Tamayo is a 28 y.o.  at 15w4d.  Doing well.  She does reports mild mid pelvic cramping/discomfort over the past couple of days.  She was very active and walked quite a bit before noticing pelvic discomfort.  No needed treatments tried.  Denies dysuria, vaginitis, contractions, LOF, or VB.      /68   Wt 80.7 kg (178 lb)   LMP 2023 (Exact Date)   BMI 27.06 kg/m²              Problems (from 23 to present)       Problem Noted Resolved    High-risk pregnancy in second trimester 2023 by Stacey Prado DO No    Rh negative status during pregnancy in second trimester 2023 by Stacey Prado DO No    Gastroesophageal reflux in pregnancy 2023 by Stacey rPado DO No    Overview Signed 2023  8:10 PM by Stacey Prado DO Pepcid, Tums, Zofran         History of pre-eclampsia 2023 by Marifer Krishna APRN No    Overview Signed 2023  8:11 PM by Stacey Prado DO     Baseline 24 hr protein pending  23 Cr 0.7, AST/ALT 16/  ASA @ 12 weeks         Rh negative status during pregnancy in first trimester 2023 by Stacey Prado DO 2023 by Jinny Boyd APRN    Overview Signed 2023  8:11 PM by Stacey Prado DO     Rhogam 28 weeks or with bleeding                 A/P: Nirmala Tamayo is a 28 y.o.  at 15w4d.    - RTC in 4 weeks for OB appt and fetal anatomy scan, arrive 15 minutes before US time     Diagnosis Plan   1. 15 weeks gestation of pregnancy        2. High-risk pregnancy in second trimester        3. History of pre-eclampsia  Start on daily 81 mg PO ASA      4. Rh negative status during pregnancy in second trimester        5. Gastroesophageal reflux in pregnancy        6. Encounter for routine screening for fetal malformation using ultrasound  US ob detail fetal anatomy single or first gestation      7. Pelvic cramping in antepartum period  Urinalysis With Culture If Indicated  -    MVP Vaginosis Panel - Swab, Vagina    MVP Vaginosis Panel - Swab, Vagina          Jinny Boyd, JOSE CARLOS  9/20/2023  10:24 CDT

## 2023-09-20 NOTE — OUTREACH NOTE
[facility]  Enrollment    Current Estimated Gestational Age: 15w4d    Questions/Answers      Flowsheet Row Responses   Would like to participate? No            Contact info provided, encouraged to call if she has any questions, concerns, or needs assistance with resources.  YAW Macias RN  Maternity Nurse Navigator    9/20/2023, 11:40 CDT

## 2024-10-14 ENCOUNTER — TELEMEDICINE (OUTPATIENT)
Dept: PSYCHIATRY | Facility: CLINIC | Age: 29
End: 2024-10-14
Payer: COMMERCIAL

## 2024-10-14 DIAGNOSIS — Z87.59 HISTORY OF POSTPARTUM DEPRESSION: ICD-10-CM

## 2024-10-14 DIAGNOSIS — Z86.59 HISTORY OF POSTPARTUM DEPRESSION: ICD-10-CM

## 2024-10-14 DIAGNOSIS — F41.1 GENERALIZED ANXIETY DISORDER: Primary | Chronic | ICD-10-CM

## 2024-10-14 DIAGNOSIS — E55.9 VITAMIN D INSUFFICIENCY: ICD-10-CM

## 2024-10-14 PROCEDURE — 90792 PSYCH DIAG EVAL W/MED SRVCS: CPT | Performed by: NURSE PRACTITIONER

## 2024-10-14 RX ORDER — SERTRALINE HYDROCHLORIDE 25 MG/1
25 TABLET, FILM COATED ORAL DAILY
Qty: 30 TABLET | Refills: 1 | Status: SHIPPED | OUTPATIENT
Start: 2024-10-14 | End: 2024-12-13

## 2024-10-14 RX ORDER — ERGOCALCIFEROL 1.25 MG/1
50000 CAPSULE, LIQUID FILLED ORAL WEEKLY
Qty: 5 CAPSULE | Refills: 5 | Status: SHIPPED | OUTPATIENT
Start: 2024-10-14

## 2024-10-14 NOTE — PROGRESS NOTES
This provider is located at [facility], [address] [place] [address].The Patient is seen remotely at home, [address] [place] [address] via [employer].  Patient is being seen via telehealth and confirm that they are in a secure environment for this session. The patient's condition being diagnosed/treated is appropriate for telemedicine. The provider identified himself/herself: herself as well as her credentials.   The patient gave consent to be seen remotely, and when consent is given they understand that the consent allows for patient identifiable information to be sent to a third party as needed.   They may refuse to be seen remotely at any time. The electronic data is encrypted and password protected, and the patient has been advised of the potential risks to privacy not withstanding such measures.    You have chosen to receive care through a telehealth visit.  Do you consent to use a video/audio connection for your medical care today? Yes. Patient verified name,  and address.       Subjective   Nirmala Tamayo is a 29 y.o. female who is here today for initial appointment.     Chief Complaint:  anxiety and irritability     HPI:  History of Present Illness  Patient presents today after being referred by her PCP JOSE CARLOS Ang for anxiety.  Patient notes that she started having anxiety when her sister passed away in  as she had an underlying 1 condition COPD and then called the police and passed away.  Patient states it was very difficult for her to finish school but she was able to.  Patient states then 2 years later she found out she was pregnant and then .  She notes that she did have postpartum depression with her second child but states that did improve.  Reports however with her last child who is 8 months old she states she has been more angry and easily annoyed and irritable.  She notes it is mostly been towards her  and not her  children.  Patient states that before she was not an angry person.  She states that work that she is doing her job fine and not distracted as it is good for her to interact with others.  Patient states at home however she feels overstimulated and not finishing things and cannot seem to slow down.  She states her  does help when he can but they have 5 children so it is difficult.  She states her oldest feels neglected and her 6-year-old has ADHD and a language disorder so it has been very difficult.  Patient reports that her sleep varies based on her youngest.  States her appetite is fine.  Denies any hypomanic or manic episodes.  Denies any breast-feeding.  The patient reports the following symptoms of anxiety: constant anxiety/worry, restlessness/on edge, difficulty concentrating, irritability, and sleep disturbance and have caused impairment in important areas of functioning.  Patient denies feeling down or hopeless or helpless or depressed however notes that she has been very tired and fatigued and wanting to sleep a lot more.  Patient states she has also been dealing with hair loss and to check her vitamins as her testosterone was low but also her vitamin D was low but not treated.  Denies any SI/HI/AH/VH.      Past Psych History: Patient reports she was diagnosed with postpartum depression around 2015 when she had her second child.  Notes she had failed and tried Lexapro, Pristiq and hydroxyzine as she did not like how those made her feel.  Denies any hospitalization or suicide attempts or self-harm    Substance Abuse: Reyes Reviewed. Patient denies.     Past Social History: Patient was born in [place] but raised in [facility].  She states she grew up with her mother and father and 2 sisters.  She notes her parents were always fighting and then  when she was 13 which was difficult.  She states 3 years later her sister passed away due to a weakened immune system and having COPD.  Patient  states that she did graduate high school but some was virtually due to anxiety.  Reports 2 years after that she had her first child.  Patient has been  now for 11 years and currently has 5 children.  Ages 11, 10, 6, 4 and 8 months.  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    Family History:  family history includes Anxiety disorder in her mother; Asthma in her son; Autism in her son; COPD in her father and sister; Cancer in her maternal grandmother and another family member; Cirrhosis in her paternal grandmother; Diabetes in her maternal grandmother, mother, paternal grandmother, and another family member; Febrile seizures in her daughter; Gallbladder disease in an other family member; Heart disease in her maternal grandfather and mother; Heart failure in her sister; Heart murmur in her son; Hypertension in her maternal grandmother, mother, and another family member; Hyperthyroidism in her mother; Hypothyroidism in her sister; Kidney failure in her sister; No Known Problems in her son and son; Parkinsonism in her maternal grandfather; Stroke in an other family member; Thyroid disease in an other family member.    Medical/Surgical History:  Past Medical History:   Diagnosis Date    Abnormal EKG 08/18/2021    Abnormal Pap smear of cervix     Acute allergic reaction     Acute bronchitis     Acute pharyngitis     Agoraphobia with panic attacks     Allergic rhinitis     Anemia     Anxiety     Anxiety state     Asthma     Stable    Back strain     Constipation     Cough     Disturbance of skin sensation     Gestational hypertension     with second pregnancy    Headache     History of transfusion     after second delivery    HPV (human papilloma virus) infection     Hx of preeclampsia, prior pregnancy, currently pregnant 01/02/2018    Irregular periods     Irritable bowel syndrome with constipation     Low back pain     Lumbosacral dysfunction     Neck pain     Palpitations     Preeclampsia     Rh  negative state in antepartum period, third trimester 12/07/2017    Rhinitis     Severe depression     Spasm     cervical spasm    Spinal headache     Upper respiratory infection     Urinary tract infection 07/14/2017    Vaginal irritation     Vitreous floaters     prob, not seen on exam    Wheezing      Past Surgical History:   Procedure Laterality Date    COLONOSCOPY N/A 3/7/2022    Procedure: COLONOSCOPY;  Surgeon: Sloan Sterling MD;  Location: [facility] ENDOSCOPY;  Service: Gastroenterology;  Laterality: N/A;    PROCEDURE GENERIC CONVERTED  02/01/2016    Physical Therapy Consult    WISDOM TOOTH EXTRACTION         Allergies   Allergen Reactions    Banana Angioedema and Hives     Itchy throat    Latex Hives and Angioedema       Current Medications:   Current Outpatient Medications   Medication Sig Dispense Refill    albuterol sulfate  (90 Base) MCG/ACT inhaler Inhale 2 puffs Every 4 (Four) Hours As Needed for Wheezing (give inhale rinsurance will cover). 18 g 5    cetirizine (zyrTEC) 10 MG tablet Take 1 tablet by mouth Daily. 30 tablet 5    docusate sodium (Colace) 100 MG capsule Take 1 capsule by mouth 2 (Two) Times a Day. 60 capsule 3    EPINEPHrine (EpiPen 2-Augusto) 0.3 MG/0.3ML solution auto-injector injection Inject once for allergic reaction as instructed and proceed to the nearest Emergency Room for further treatment 2 each 1    ondansetron ODT (ZOFRAN-ODT) 4 MG disintegrating tablet Place 1 tablet on the tongue Every 8 (Eight) Hours As Needed for Nausea or Vomiting. 30 tablet 2    aspirin 81 MG EC tablet Take 1 tablet by mouth Daily. Begin at 12 weeks gestation. (Patient not taking: Reported on 10/14/2024) 30 tablet 6    doxylamine (UNISOM) 25 MG tablet Take 1 tablet by mouth At Night As Needed for Sleep or Nausea. (Patient not taking: Reported on 10/14/2024) 30 tablet 3    famotidine (Pepcid) 40 MG tablet Take 1 tablet by mouth Daily. (Patient not taking: Reported on 10/14/2024) 30 tablet 3     fluconazole (Diflucan) 150 MG tablet Take 1 tablet by mouth today and another tablet in 4 days (Patient not taking: Reported on 10/14/2024) 2 tablet 0    fluticasone (FLONASE) 50 MCG/ACT nasal spray 2 sprays into the nostril(s) as directed by provider Daily. 18.2 mL 5    polyethylene glycol (MiraLax) 17 g packet Take 17 g by mouth Daily. (Patient not taking: Reported on 10/14/2024) 10 each 3    prenatal vitamin (prenatal, CLASSIC, vitamin) tablet Take  by mouth Daily. (Patient not taking: Reported on 10/14/2024)      sertraline (Zoloft) 25 MG tablet Take 1 tablet by mouth Daily for 60 days. 30 tablet 1    vitamin B-6 (PYRIDOXINE) 25 MG tablet Take 1 tablet by mouth 2 (Two) Times a Day. (Patient not taking: Reported on 10/14/2024) 60 tablet 3    vitamin D (ERGOCALCIFEROL) 1.25 MG (98213 UT) capsule capsule Take 1 capsule by mouth 1 (One) Time Per Week. 5 capsule 5     No current facility-administered medications for this visit.       Review of Systems   Psychiatric/Behavioral:  Positive for agitation, decreased concentration and sleep disturbance. The patient is nervous/anxious.    All other systems reviewed and are negative.      Review of Systems - General ROS: negative for - chills, fever or malaise  Ophthalmic ROS: negative for - loss of vision  ENT ROS: negative for - hearing change  Allergy and Immunology ROS: negative for - hives  Hematological and Lymphatic ROS: negative for - bleeding problems  Endocrine ROS: negative for - skin changes  Respiratory ROS: no cough, shortness of breath, or wheezing  Cardiovascular ROS: no chest pain or dyspnea on exertion  Gastrointestinal ROS: no abdominal pain, change in bowel habits, or black or bloody stools  Genito-Urinary ROS: no dysuria, trouble voiding, or hematuria  Musculoskeletal ROS: negative for - gait disturbance  Neurological ROS: no TIA or stroke symptoms  Dermatological ROS: negative for rash    Objective   Physical Exam  Nursing note reviewed.    Constitutional:       Appearance: Normal appearance.   Neurological:      Mental Status: She is alert.   Psychiatric:         Attention and Perception: Attention and perception normal.         Mood and Affect: Mood is anxious and depressed.         Speech: Speech normal.         Behavior: Behavior is agitated. Behavior is cooperative.         Thought Content: Thought content normal.         Cognition and Memory: Cognition and memory normal.         Judgment: Judgment normal.       Last menstrual period 09/02/2024, not currently breastfeeding.  Due to the remote nature of this encounter (virtual encounter), vitals were unable to be obtained.  Height stated at 68 inches.  Weight stated at 178 pounds.    Result Review :     The following data was reviewed by: JOSE CARLOS Perales on 10/14/2024:  Common labs          6/30/2024    14:57 9/5/2024    11:35 9/13/2024    10:47   Common Labs   Glucose 87         BUN 7         Creatinine 0.67         Sodium 140         Potassium 3.8         Chloride 108         Calcium 9.1         Albumin 4.4         Total Bilirubin 0.33         Alkaline Phosphatase 71         AST (SGOT) 21         ALT (SGPT) 16         WBC   5.3       Hemoglobin   13.0       Hematocrit   38.8       Platelets   236       Hemoglobin A1C  5.3     5.5          Details          This result is from an external source.             CMP          6/30/2024    14:57   CMP   Glucose 87       BUN 7       Creatinine 0.67       Sodium 140       Potassium 3.8       Chloride 108       Calcium 9.1       Total Protein 7.6       Albumin 4.4       Total Bilirubin 0.33       Alkaline Phosphatase 71       AST (SGOT) 21       ALT (SGPT) 16          Details          This result is from an external source.             CBC          2/26/2024    17:10 4/8/2024    13:47 9/13/2024    10:47   CBC   WBC 9.7     6.4     5.3       RBC 4.45     4.86     4.60       Hemoglobin 12.4     13.1     13.0       Hematocrit 37.1     40.5     38.8        MCV 83.4     83.3     84.3       MCH 27.9     27.0     28.3       MCHC 33.4     32.3     33.5       RDW 14.5     13.9     13.5       Platelets 159     208     236          Details          This result is from an external source.             CBC w/diff          2/26/2024    17:10 4/8/2024    13:47 9/13/2024    10:47   CBC w/Diff   WBC 9.7     6.4     5.3       RBC 4.45     4.86     4.60       Hemoglobin 12.4     13.1     13.0       Hematocrit 37.1     40.5     38.8       MCV 83.4     83.3     84.3       MCH 27.9     27.0     28.3       MCHC 33.4     32.3     33.5       RDW 14.5     13.9     13.5       Platelets 159     208     236       Neutrophil Rel % 79.3     60.5     67.2       Immature Granulocyte Rel % 0.8     0.2     0.4       Lymphocyte Rel % 12.6     31.8     23.7       Monocyte Rel % 6.9     5.6     6.8       Eosinophil Rel % 0.2     1.4     1.3       Basophil Rel % 0.2     0.5     0.6          Details          This result is from an external source.             Lipid Panel          4/8/2024    13:47   Lipid Panel   Total Cholesterol 166       Triglycerides 75       HDL Cholesterol 65       VLDL Cholesterol 15       LDL Cholesterol  86       LDL/HDL Ratio 1.32          Details          This result is from an external source.               Electrolytes          6/30/2024    14:57   Electrolytes   Sodium 140       Potassium 3.8       Chloride 108       Calcium 9.1          Details          This result is from an external source.             BMP          6/30/2024    14:57   BMP   Glucose 87       BUN 7       Creatinine 0.67       Sodium 140       Potassium 3.8       Chloride 108       CO2 27       Calcium 9.1          Details          This result is from an external source.             UA          6/30/2024    15:08   Urinalysis   Squamous Epithelial Cells, UA OCCASIONAL       RBC, UA 0 to 5       Bacteria, UA TRACE          Details          This result is from an external source.             Data reviewed  : PCP notes      Mental Status Exam:   Hygiene:   good  Cooperation:  Cooperative  Eye Contact:  Good  Psychomotor Behavior:  Restless  Affect:  Appropriate  Hopelessness: Denies  Speech:  Normal  Thought Process:  Goal directed  Thought Content:  Normal  Suicidal:  None  Homicidal:  None  Hallucinations:  None  Delusion:  None  Memory:  Intact  Orientation:  Person, Place, Time, and Situation  Reliability:  good  Insight:  Good  Judgement:  Good  Impulse Control:  Good and Fair  Physical/Medical Issues:  Yes see medical hx    PHQ-9 Score:   PHQ-9 Total Score: (Patient-Rptd) 4     PHQ-9 Depression Screening    Little interest or pleasure in doing things? (Patient-Rptd) Not at all   Feeling down, depressed, or hopeless? (Patient-Rptd) Not at all   Trouble falling or staying asleep, or sleeping too much? (Patient-Rptd) Not at all   Feeling tired or having little energy? (Patient-Rptd) Over half   Poor appetite or overeating? (Patient-Rptd) Not at all   Feeling bad about yourself - or that you are a failure or have let yourself or your family down? (Patient-Rptd) Not at all   Trouble concentrating on things, such as reading the newspaper or watching television? (Patient-Rptd) Over half   Moving or speaking so slowly that other people could have noticed? Or the opposite - being so fidgety or restless that you have been moving around a lot more than usual? (Patient-Rptd) Not at all   Thoughts that you would be better off dead, or of hurting yourself in some way? (Patient-Rptd) Not at all   PHQ-9 Total Score (Patient-Rptd) 4   If you checked off any problems, how difficult have these problems made it for you to do your work, take care of things at home, or get along with other people? (Patient-Rptd) Somewhat difficult         PHQ-9 Total Score: (Patient-Rptd) 4     LETY-7  Feeling nervous, anxious or on edge: (Patient-Rptd) Several days  Not being able to stop or control worrying: (Patient-Rptd) Not at all  Worrying too  much about different things: (Patient-Rptd) Several days  Trouble Relaxing: (Patient-Rptd) Several days  Being so restless that it is hard to sit still: (Patient-Rptd) Several days  Feeling afraid as if something awful might happen: (Patient-Rptd) Several days  Becoming easily annoyed or irritable: (Patient-Rptd) More than half the days  LETY 7 Total Score: (Patient-Rptd) 7  If you checked any problems, how difficult have these problems made it for you to do your work, take care of things at home, or get along with other people: (Patient-Rptd) Somewhat difficult      Patient screened positive for depression based on a PHQ-9 score of 4 on 10/14/2024. Follow-up recommendations include: Prescribed antidepressant medication treatment.        Assessment & Plan   Diagnoses and all orders for this visit:    1. Generalized anxiety disorder (Primary)  -     sertraline (Zoloft) 25 MG tablet; Take 1 tablet by mouth Daily for 60 days.  Dispense: 30 tablet; Refill: 1  -     Ambulatory Referral to Behavioral Health    2. Vitamin D insufficiency  -     vitamin D (ERGOCALCIFEROL) 1.25 MG (86209 UT) capsule capsule; Take 1 capsule by mouth 1 (One) Time Per Week.  Dispense: 5 capsule; Refill: 5    3. History of postpartum depression  -     sertraline (Zoloft) 25 MG tablet; Take 1 tablet by mouth Daily for 60 days.  Dispense: 30 tablet; Refill: 1  -     Ambulatory Referral to Behavioral Health        Encounter Diagnoses   Name Primary?    Vitamin D insufficiency     Generalized anxiety disorder Yes    History of postpartum depression          TREATMENT PLAN/GOALS: Continue supportive psychotherapy efforts and medications as indicated. Treatment and medication options discussed during today's visit. Patient ackowledged and verbally consented to continue with current treatment plan and was educated on the importance of compliance with treatment and follow-up appointments.    MEDICATION ISSUES:  We discussed risks, benefits, and side  effects of the above medications and the patient was agreeable with the plan. Patient was educated on the importance of compliance with treatment and follow-up appointments.  Patient is agreeable to call the office with any worsening of symptoms or onset of side effects. Patient is agreeable to call 911 or go to the nearest ER should he/she begin having SI/HI.  Patient states her  has had a vasectomy but has not gone for the second follow-up due to the doctor's availability encouraged her to use caution as getting pregnant with this medication as there are no known birth defects and side effects but also one of the sober medications patient verbalized understanding.    -Begin Zoloft 25 mg daily for depression and anxiety.  It makes patient drowsy she can take at night however if any worsening symptoms or any SI to immediately stop contact the clinic and/or go to nearest ER patient verbalized understanding.  -Begin vitamin D 50,000 units weekly  -Referral placed for therapy  -Encouraged patient that she needs to set up a chore chart and discussed positive and negative reinforcements and behaviors.  Also encouraged patient that she needs to set up a daily routine and schedule for all of her kids so they know what to expect next to help with anxiety and behaviors.  -Since 6-year-old is having issues encouraged mother to talk with school about IEP or 504 plan since he has a known diagnosis of language impairment and ADHD.  Also encouraged her that she can ask her PCP to send a referral to Dr. Del Valle at her office who is a psychiatrist that can better assess him and change medications if needed she verbalized understanding.     Counseled patient regarding multimodal approach with healthy nutrition, healthy sleep, regular physical activity, social activities, counseling, and medications.      Coping skills reviewed and encouraged positive framing of thoughts     Assisted patient in processing above session content;  acknowledged and normalized patient’s thoughts, feelings, and concerns.  Applied  positive coping skills and behavior management in session.  Allowed patient to freely discuss issues without interruption or judgment. Provided safe, confidential environment to facilitate the development of positive therapeutic relationship and encourage open, honest communication. Assisted patient in identifying risk factors which would indicate the need for higher level of care including thoughts to harm self or others and/or self-harming behavior and encouraged patient to contact this office, call 911, or present to the nearest emergency room should any of these events occur. Discussed crisis intervention services and means to access.       We discussed risks, benefits, and side effects of the above medication and the patient was agreeable with the plan.     Return in about 4 weeks (around 11/11/2024), or if symptoms worsen or fail to improve, for Recheck.         MEDS ORDERED DURING VISIT:  New Medications Ordered This Visit   Medications    vitamin D (ERGOCALCIFEROL) 1.25 MG (35252 UT) capsule capsule     Sig: Take 1 capsule by mouth 1 (One) Time Per Week.     Dispense:  5 capsule     Refill:  5    sertraline (Zoloft) 25 MG tablet     Sig: Take 1 tablet by mouth Daily for 60 days.     Dispense:  30 tablet     Refill:  1           Follow Up   Return in about 4 weeks (around 11/11/2024), or if symptoms worsen or fail to improve, for Recheck.    Patient was given instructions and counseling regarding her condition or for health maintenance advice. Please see specific information pulled into the AVS if appropriate.       This document has been electronically signed by JOSE CARLOS Perales  October 14, 2024 12:29 EDT    Part of this note may be an electronic transcription/translation of spoken language to printed text using the Dragon Dictation System.

## (undated) DEVICE — SINGLE-USE BIOPSY FORCEPS: Brand: RADIAL JAW 4